# Patient Record
Sex: MALE | Race: WHITE | NOT HISPANIC OR LATINO | Employment: FULL TIME | URBAN - METROPOLITAN AREA
[De-identification: names, ages, dates, MRNs, and addresses within clinical notes are randomized per-mention and may not be internally consistent; named-entity substitution may affect disease eponyms.]

---

## 2019-11-29 ENCOUNTER — APPOINTMENT (OUTPATIENT)
Dept: PREADMISSION TESTING | Facility: HOSPITAL | Age: 54
End: 2019-11-29

## 2019-11-29 VITALS
DIASTOLIC BLOOD PRESSURE: 104 MMHG | HEART RATE: 71 BPM | SYSTOLIC BLOOD PRESSURE: 170 MMHG | WEIGHT: 315 LBS | BODY MASS INDEX: 47.74 KG/M2 | TEMPERATURE: 98.2 F | OXYGEN SATURATION: 99 % | RESPIRATION RATE: 20 BRPM | HEIGHT: 68 IN

## 2019-11-29 LAB
ANION GAP SERPL CALCULATED.3IONS-SCNC: 13.4 MMOL/L (ref 5–15)
BUN BLD-MCNC: 16 MG/DL (ref 6–20)
BUN/CREAT SERPL: 17.8 (ref 7–25)
CALCIUM SPEC-SCNC: 9.4 MG/DL (ref 8.6–10.5)
CHLORIDE SERPL-SCNC: 101 MMOL/L (ref 98–107)
CO2 SERPL-SCNC: 26.6 MMOL/L (ref 22–29)
CREAT BLD-MCNC: 0.9 MG/DL (ref 0.76–1.27)
DEPRECATED RDW RBC AUTO: 39.8 FL (ref 37–54)
ERYTHROCYTE [DISTWIDTH] IN BLOOD BY AUTOMATED COUNT: 13 % (ref 12.3–15.4)
GFR SERPL CREATININE-BSD FRML MDRD: 88 ML/MIN/1.73
GLUCOSE BLD-MCNC: 124 MG/DL (ref 65–99)
HCT VFR BLD AUTO: 41.3 % (ref 37.5–51)
HGB BLD-MCNC: 14.3 G/DL (ref 13–17.7)
MCH RBC QN AUTO: 29.7 PG (ref 26.6–33)
MCHC RBC AUTO-ENTMCNC: 34.6 G/DL (ref 31.5–35.7)
MCV RBC AUTO: 85.9 FL (ref 79–97)
PLATELET # BLD AUTO: 209 10*3/MM3 (ref 140–450)
PMV BLD AUTO: 9.9 FL (ref 6–12)
POTASSIUM BLD-SCNC: 4.7 MMOL/L (ref 3.5–5.2)
RBC # BLD AUTO: 4.81 10*6/MM3 (ref 4.14–5.8)
SODIUM BLD-SCNC: 141 MMOL/L (ref 136–145)
WBC NRBC COR # BLD: 6.41 10*3/MM3 (ref 3.4–10.8)

## 2019-11-29 PROCEDURE — 80048 BASIC METABOLIC PNL TOTAL CA: CPT | Performed by: OTOLARYNGOLOGY

## 2019-11-29 PROCEDURE — 36415 COLL VENOUS BLD VENIPUNCTURE: CPT

## 2019-11-29 PROCEDURE — 85027 COMPLETE CBC AUTOMATED: CPT | Performed by: OTOLARYNGOLOGY

## 2019-11-29 PROCEDURE — 93010 ELECTROCARDIOGRAM REPORT: CPT | Performed by: INTERNAL MEDICINE

## 2019-11-29 PROCEDURE — 93005 ELECTROCARDIOGRAM TRACING: CPT

## 2019-11-29 RX ORDER — CETIRIZINE HYDROCHLORIDE 10 MG/1
10 TABLET ORAL DAILY
COMMUNITY

## 2019-11-29 RX ORDER — MOMETASONE FUROATE 50 UG/1
2 SPRAY, METERED NASAL NIGHTLY
COMMUNITY

## 2019-11-29 RX ORDER — IBUPROFEN 200 MG
400 TABLET ORAL EVERY 6 HOURS PRN
COMMUNITY

## 2019-11-29 RX ORDER — ACETAMINOPHEN 500 MG
1000 TABLET ORAL EVERY 6 HOURS PRN
COMMUNITY

## 2019-11-29 RX ORDER — LISINOPRIL 40 MG/1
40 TABLET ORAL NIGHTLY
COMMUNITY

## 2019-12-03 ENCOUNTER — ANESTHESIA (OUTPATIENT)
Dept: PERIOP | Facility: HOSPITAL | Age: 54
End: 2019-12-03

## 2019-12-03 ENCOUNTER — HOSPITAL ENCOUNTER (OUTPATIENT)
Facility: HOSPITAL | Age: 54
Setting detail: HOSPITAL OUTPATIENT SURGERY
Discharge: HOME | End: 2019-12-03
Attending: OTOLARYNGOLOGY | Admitting: OTOLARYNGOLOGY

## 2019-12-03 ENCOUNTER — ANESTHESIA EVENT (OUTPATIENT)
Dept: PERIOP | Facility: HOSPITAL | Age: 54
End: 2019-12-03

## 2019-12-03 VITALS
HEIGHT: 68 IN | DIASTOLIC BLOOD PRESSURE: 95 MMHG | HEART RATE: 70 BPM | BODY MASS INDEX: 47.74 KG/M2 | RESPIRATION RATE: 16 BRPM | WEIGHT: 315 LBS | SYSTOLIC BLOOD PRESSURE: 147 MMHG | OXYGEN SATURATION: 94 % | TEMPERATURE: 98.3 F

## 2019-12-03 DIAGNOSIS — J35.8 TONSIL ASYMMETRY: ICD-10-CM

## 2019-12-03 LAB — GLUCOSE BLDC GLUCOMTR-MCNC: 160 MG/DL (ref 70–130)

## 2019-12-03 PROCEDURE — 25010000002 HYDROMORPHONE PER 4 MG: Performed by: NURSE ANESTHETIST, CERTIFIED REGISTERED

## 2019-12-03 PROCEDURE — 88341 IMHCHEM/IMCYTCHM EA ADD ANTB: CPT | Performed by: OTOLARYNGOLOGY

## 2019-12-03 PROCEDURE — 25010000002 PROPOFOL 10 MG/ML EMULSION: Performed by: NURSE ANESTHETIST, CERTIFIED REGISTERED

## 2019-12-03 PROCEDURE — 25010000002 MIDAZOLAM PER 1 MG: Performed by: ANESTHESIOLOGY

## 2019-12-03 PROCEDURE — 25010000002 FENTANYL CITRATE (PF) 100 MCG/2ML SOLUTION: Performed by: NURSE ANESTHETIST, CERTIFIED REGISTERED

## 2019-12-03 PROCEDURE — 88342 IMHCHEM/IMCYTCHM 1ST ANTB: CPT | Performed by: OTOLARYNGOLOGY

## 2019-12-03 PROCEDURE — 25010000002 SUCCINYLCHOLINE PER 20 MG: Performed by: NURSE ANESTHETIST, CERTIFIED REGISTERED

## 2019-12-03 PROCEDURE — 82962 GLUCOSE BLOOD TEST: CPT

## 2019-12-03 PROCEDURE — 25010000002 ONDANSETRON PER 1 MG: Performed by: NURSE ANESTHETIST, CERTIFIED REGISTERED

## 2019-12-03 PROCEDURE — 25010000002 MAGNESIUM SULFATE PER 500 MG OF MAGNESIUM: Performed by: NURSE ANESTHETIST, CERTIFIED REGISTERED

## 2019-12-03 PROCEDURE — 88304 TISSUE EXAM BY PATHOLOGIST: CPT | Performed by: OTOLARYNGOLOGY

## 2019-12-03 PROCEDURE — 25010000002 ROPIVACAINE PER 1 MG: Performed by: OTOLARYNGOLOGY

## 2019-12-03 PROCEDURE — 88331 PATH CONSLTJ SURG 1 BLK 1SPC: CPT | Performed by: OTOLARYNGOLOGY

## 2019-12-03 RX ORDER — ROPIVACAINE HYDROCHLORIDE 5 MG/ML
INJECTION, SOLUTION EPIDURAL; INFILTRATION; PERINEURAL AS NEEDED
Status: DISCONTINUED | OUTPATIENT
Start: 2019-12-03 | End: 2019-12-03 | Stop reason: HOSPADM

## 2019-12-03 RX ORDER — MAGNESIUM HYDROXIDE 1200 MG/15ML
LIQUID ORAL AS NEEDED
Status: DISCONTINUED | OUTPATIENT
Start: 2019-12-03 | End: 2019-12-03 | Stop reason: HOSPADM

## 2019-12-03 RX ORDER — LIDOCAINE HYDROCHLORIDE 10 MG/ML
0.5 INJECTION, SOLUTION EPIDURAL; INFILTRATION; INTRACAUDAL; PERINEURAL ONCE AS NEEDED
Status: DISCONTINUED | OUTPATIENT
Start: 2019-12-03 | End: 2019-12-03 | Stop reason: HOSPADM

## 2019-12-03 RX ORDER — MIDAZOLAM HYDROCHLORIDE 1 MG/ML
1 INJECTION INTRAMUSCULAR; INTRAVENOUS
Status: DISCONTINUED | OUTPATIENT
Start: 2019-12-03 | End: 2019-12-03 | Stop reason: HOSPADM

## 2019-12-03 RX ORDER — ROCURONIUM BROMIDE 10 MG/ML
INJECTION, SOLUTION INTRAVENOUS AS NEEDED
Status: DISCONTINUED | OUTPATIENT
Start: 2019-12-03 | End: 2019-12-03 | Stop reason: SURG

## 2019-12-03 RX ORDER — ONDANSETRON 2 MG/ML
4 INJECTION INTRAMUSCULAR; INTRAVENOUS ONCE AS NEEDED
Status: DISCONTINUED | OUTPATIENT
Start: 2019-12-03 | End: 2019-12-03 | Stop reason: HOSPADM

## 2019-12-03 RX ORDER — DIPHENHYDRAMINE HCL 25 MG
25 CAPSULE ORAL
Status: DISCONTINUED | OUTPATIENT
Start: 2019-12-03 | End: 2019-12-03 | Stop reason: HOSPADM

## 2019-12-03 RX ORDER — LIDOCAINE HYDROCHLORIDE 20 MG/ML
INJECTION, SOLUTION INFILTRATION; PERINEURAL AS NEEDED
Status: DISCONTINUED | OUTPATIENT
Start: 2019-12-03 | End: 2019-12-03 | Stop reason: SURG

## 2019-12-03 RX ORDER — FAMOTIDINE 10 MG/ML
20 INJECTION, SOLUTION INTRAVENOUS ONCE
Status: COMPLETED | OUTPATIENT
Start: 2019-12-03 | End: 2019-12-03

## 2019-12-03 RX ORDER — HYDROCODONE BITARTRATE AND ACETAMINOPHEN 7.5; 325 MG/1; MG/1
1 TABLET ORAL ONCE AS NEEDED
Status: DISCONTINUED | OUTPATIENT
Start: 2019-12-03 | End: 2019-12-03 | Stop reason: HOSPADM

## 2019-12-03 RX ORDER — DIPHENHYDRAMINE HYDROCHLORIDE 50 MG/ML
12.5 INJECTION INTRAMUSCULAR; INTRAVENOUS
Status: DISCONTINUED | OUTPATIENT
Start: 2019-12-03 | End: 2019-12-03 | Stop reason: HOSPADM

## 2019-12-03 RX ORDER — MEPERIDINE HYDROCHLORIDE 25 MG/ML
12.5 INJECTION INTRAMUSCULAR; INTRAVENOUS; SUBCUTANEOUS
Status: DISCONTINUED | OUTPATIENT
Start: 2019-12-03 | End: 2019-12-03 | Stop reason: HOSPADM

## 2019-12-03 RX ORDER — FENTANYL CITRATE 50 UG/ML
50 INJECTION, SOLUTION INTRAMUSCULAR; INTRAVENOUS
Status: DISCONTINUED | OUTPATIENT
Start: 2019-12-03 | End: 2019-12-03 | Stop reason: HOSPADM

## 2019-12-03 RX ORDER — EPHEDRINE SULFATE 50 MG/ML
5 INJECTION, SOLUTION INTRAVENOUS ONCE AS NEEDED
Status: DISCONTINUED | OUTPATIENT
Start: 2019-12-03 | End: 2019-12-03 | Stop reason: HOSPADM

## 2019-12-03 RX ORDER — SODIUM CHLORIDE 0.9 % (FLUSH) 0.9 %
3 SYRINGE (ML) INJECTION EVERY 12 HOURS SCHEDULED
Status: DISCONTINUED | OUTPATIENT
Start: 2019-12-03 | End: 2019-12-03 | Stop reason: HOSPADM

## 2019-12-03 RX ORDER — FLUMAZENIL 0.1 MG/ML
0.2 INJECTION INTRAVENOUS AS NEEDED
Status: DISCONTINUED | OUTPATIENT
Start: 2019-12-03 | End: 2019-12-03 | Stop reason: HOSPADM

## 2019-12-03 RX ORDER — HYDROCODONE BITARTRATE AND ACETAMINOPHEN 5; 325 MG/1; MG/1
1 TABLET ORAL EVERY 4 HOURS PRN
Qty: 15 TABLET | Refills: 0 | Status: SHIPPED | OUTPATIENT
Start: 2019-12-03

## 2019-12-03 RX ORDER — IPRATROPIUM BROMIDE AND ALBUTEROL SULFATE 2.5; .5 MG/3ML; MG/3ML
3 SOLUTION RESPIRATORY (INHALATION) ONCE AS NEEDED
Status: DISCONTINUED | OUTPATIENT
Start: 2019-12-03 | End: 2019-12-03 | Stop reason: HOSPADM

## 2019-12-03 RX ORDER — LABETALOL HYDROCHLORIDE 5 MG/ML
5 INJECTION, SOLUTION INTRAVENOUS
Status: DISCONTINUED | OUTPATIENT
Start: 2019-12-03 | End: 2019-12-03 | Stop reason: HOSPADM

## 2019-12-03 RX ORDER — SODIUM CHLORIDE, SODIUM LACTATE, POTASSIUM CHLORIDE, CALCIUM CHLORIDE 600; 310; 30; 20 MG/100ML; MG/100ML; MG/100ML; MG/100ML
100 INJECTION, SOLUTION INTRAVENOUS CONTINUOUS
Status: DISCONTINUED | OUTPATIENT
Start: 2019-12-03 | End: 2019-12-03 | Stop reason: HOSPADM

## 2019-12-03 RX ORDER — ERYTHROMYCIN 5 MG/G
OINTMENT OPHTHALMIC 2 TIMES DAILY
Qty: 3.5 G | Refills: 2 | Status: SHIPPED | OUTPATIENT
Start: 2019-12-03 | End: 2019-12-03 | Stop reason: HOSPADM

## 2019-12-03 RX ORDER — FENTANYL CITRATE 50 UG/ML
INJECTION, SOLUTION INTRAMUSCULAR; INTRAVENOUS AS NEEDED
Status: DISCONTINUED | OUTPATIENT
Start: 2019-12-03 | End: 2019-12-03 | Stop reason: SURG

## 2019-12-03 RX ORDER — MAGNESIUM SULFATE HEPTAHYDRATE 500 MG/ML
INJECTION, SOLUTION INTRAMUSCULAR; INTRAVENOUS AS NEEDED
Status: DISCONTINUED | OUTPATIENT
Start: 2019-12-03 | End: 2019-12-03 | Stop reason: SURG

## 2019-12-03 RX ORDER — NALOXONE HCL 0.4 MG/ML
0.2 VIAL (ML) INJECTION AS NEEDED
Status: DISCONTINUED | OUTPATIENT
Start: 2019-12-03 | End: 2019-12-03 | Stop reason: HOSPADM

## 2019-12-03 RX ORDER — OXYCODONE AND ACETAMINOPHEN 7.5; 325 MG/1; MG/1
1 TABLET ORAL ONCE AS NEEDED
Status: DISCONTINUED | OUTPATIENT
Start: 2019-12-03 | End: 2019-12-03 | Stop reason: HOSPADM

## 2019-12-03 RX ORDER — CEFAZOLIN SODIUM IN 0.9 % NACL 3 G/100 ML
3 INTRAVENOUS SOLUTION, PIGGYBACK (ML) INTRAVENOUS ONCE
Status: COMPLETED | OUTPATIENT
Start: 2019-12-03 | End: 2019-12-03

## 2019-12-03 RX ORDER — SCOLOPAMINE TRANSDERMAL SYSTEM 1 MG/1
1 PATCH, EXTENDED RELEASE TRANSDERMAL CONTINUOUS
Status: DISCONTINUED | OUTPATIENT
Start: 2019-12-03 | End: 2019-12-03 | Stop reason: HOSPADM

## 2019-12-03 RX ORDER — LIDOCAINE HYDROCHLORIDE 40 MG/ML
SOLUTION TOPICAL AS NEEDED
Status: DISCONTINUED | OUTPATIENT
Start: 2019-12-03 | End: 2019-12-03 | Stop reason: SURG

## 2019-12-03 RX ORDER — SUCCINYLCHOLINE CHLORIDE 20 MG/ML
INJECTION INTRAMUSCULAR; INTRAVENOUS AS NEEDED
Status: DISCONTINUED | OUTPATIENT
Start: 2019-12-03 | End: 2019-12-03 | Stop reason: SURG

## 2019-12-03 RX ORDER — HYDRALAZINE HYDROCHLORIDE 20 MG/ML
5 INJECTION INTRAMUSCULAR; INTRAVENOUS
Status: DISCONTINUED | OUTPATIENT
Start: 2019-12-03 | End: 2019-12-03 | Stop reason: HOSPADM

## 2019-12-03 RX ORDER — PROMETHAZINE HYDROCHLORIDE 25 MG/ML
12.5 INJECTION, SOLUTION INTRAMUSCULAR; INTRAVENOUS ONCE AS NEEDED
Status: DISCONTINUED | OUTPATIENT
Start: 2019-12-03 | End: 2019-12-03 | Stop reason: HOSPADM

## 2019-12-03 RX ORDER — ACETAMINOPHEN 325 MG/1
650 TABLET ORAL ONCE AS NEEDED
Status: COMPLETED | OUTPATIENT
Start: 2019-12-03 | End: 2019-12-03

## 2019-12-03 RX ORDER — MIDAZOLAM HYDROCHLORIDE 1 MG/ML
2 INJECTION INTRAMUSCULAR; INTRAVENOUS
Status: DISCONTINUED | OUTPATIENT
Start: 2019-12-03 | End: 2019-12-03 | Stop reason: HOSPADM

## 2019-12-03 RX ORDER — SODIUM CHLORIDE 0.9 % (FLUSH) 0.9 %
3-10 SYRINGE (ML) INJECTION AS NEEDED
Status: DISCONTINUED | OUTPATIENT
Start: 2019-12-03 | End: 2019-12-03 | Stop reason: HOSPADM

## 2019-12-03 RX ORDER — PROPOFOL 10 MG/ML
VIAL (ML) INTRAVENOUS AS NEEDED
Status: DISCONTINUED | OUTPATIENT
Start: 2019-12-03 | End: 2019-12-03 | Stop reason: SURG

## 2019-12-03 RX ORDER — HYDROMORPHONE HYDROCHLORIDE 1 MG/ML
0.5 INJECTION, SOLUTION INTRAMUSCULAR; INTRAVENOUS; SUBCUTANEOUS
Status: DISCONTINUED | OUTPATIENT
Start: 2019-12-03 | End: 2019-12-03 | Stop reason: HOSPADM

## 2019-12-03 RX ORDER — HYDROCODONE BITARTRATE AND ACETAMINOPHEN 5; 325 MG/1; MG/1
1-2 TABLET ORAL EVERY 4 HOURS PRN
Qty: 15 TABLET | Refills: 0 | Status: SHIPPED | OUTPATIENT
Start: 2019-12-03

## 2019-12-03 RX ORDER — ONDANSETRON 2 MG/ML
INJECTION INTRAMUSCULAR; INTRAVENOUS AS NEEDED
Status: DISCONTINUED | OUTPATIENT
Start: 2019-12-03 | End: 2019-12-03 | Stop reason: SURG

## 2019-12-03 RX ORDER — SODIUM CHLORIDE, SODIUM LACTATE, POTASSIUM CHLORIDE, CALCIUM CHLORIDE 600; 310; 30; 20 MG/100ML; MG/100ML; MG/100ML; MG/100ML
9 INJECTION, SOLUTION INTRAVENOUS CONTINUOUS
Status: DISCONTINUED | OUTPATIENT
Start: 2019-12-03 | End: 2019-12-03 | Stop reason: HOSPADM

## 2019-12-03 RX ADMIN — CEFAZOLIN 3 G: 1 INJECTION, POWDER, FOR SOLUTION INTRAMUSCULAR; INTRAVENOUS; PARENTERAL at 07:00

## 2019-12-03 RX ADMIN — ROCURONIUM BROMIDE 40 MG: 10 INJECTION INTRAVENOUS at 07:20

## 2019-12-03 RX ADMIN — HYDROMORPHONE HYDROCHLORIDE 0.5 MG: 1 INJECTION, SOLUTION INTRAMUSCULAR; INTRAVENOUS; SUBCUTANEOUS at 08:45

## 2019-12-03 RX ADMIN — MIDAZOLAM 1 MG: 1 INJECTION INTRAMUSCULAR; INTRAVENOUS at 06:37

## 2019-12-03 RX ADMIN — LIDOCAINE HYDROCHLORIDE 60 MG: 20 INJECTION, SOLUTION INFILTRATION; PERINEURAL at 07:09

## 2019-12-03 RX ADMIN — FENTANYL CITRATE 50 MCG: 50 INJECTION INTRAMUSCULAR; INTRAVENOUS at 07:01

## 2019-12-03 RX ADMIN — MAGNESIUM SULFATE HEPTAHYDRATE 2 G: 500 INJECTION, SOLUTION INTRAMUSCULAR; INTRAVENOUS at 07:13

## 2019-12-03 RX ADMIN — LIDOCAINE HYDROCHLORIDE 1 EACH: 40 SOLUTION TOPICAL at 07:11

## 2019-12-03 RX ADMIN — SODIUM CHLORIDE, POTASSIUM CHLORIDE, SODIUM LACTATE AND CALCIUM CHLORIDE: 600; 310; 30; 20 INJECTION, SOLUTION INTRAVENOUS at 07:35

## 2019-12-03 RX ADMIN — PROPOFOL 200 MG: 10 INJECTION, EMULSION INTRAVENOUS at 07:09

## 2019-12-03 RX ADMIN — FENTANYL CITRATE 50 MCG: 50 INJECTION INTRAMUSCULAR; INTRAVENOUS at 07:06

## 2019-12-03 RX ADMIN — ACETAMINOPHEN 650 MG: 325 TABLET, FILM COATED ORAL at 09:50

## 2019-12-03 RX ADMIN — SODIUM CHLORIDE, POTASSIUM CHLORIDE, SODIUM LACTATE AND CALCIUM CHLORIDE 9 ML/HR: 600; 310; 30; 20 INJECTION, SOLUTION INTRAVENOUS at 06:37

## 2019-12-03 RX ADMIN — SUGAMMADEX 400 MG: 100 INJECTION, SOLUTION INTRAVENOUS at 07:54

## 2019-12-03 RX ADMIN — SUCCINYLCHOLINE CHLORIDE 200 MG: 20 INJECTION, SOLUTION INTRAMUSCULAR; INTRAVENOUS; PARENTERAL at 07:10

## 2019-12-03 RX ADMIN — ROCURONIUM BROMIDE 10 MG: 10 INJECTION INTRAVENOUS at 07:09

## 2019-12-03 RX ADMIN — FAMOTIDINE 20 MG: 10 INJECTION INTRAVENOUS at 06:37

## 2019-12-03 RX ADMIN — ONDANSETRON HYDROCHLORIDE 4 MG: 2 SOLUTION INTRAMUSCULAR; INTRAVENOUS at 07:55

## 2019-12-03 NOTE — ANESTHESIA POSTPROCEDURE EVALUATION
Patient: Hayden Michaud    Procedure Summary     Date:  12/03/19 Room / Location:  [address] MAIN OR    Anesthesia Start:  0700 Anesthesia Stop:  0816    Procedure:  LEFT TONSIL BIOPSY (Left Throat) Diagnosis:      Surgeon:  Lebron Truong MD Provider:  Ian Coates MD    Anesthesia Type:  general ASA Status:  3          Anesthesia Type: general  Last vitals  BP   147/95 (12/03/19 0915)   Temp   36.8 °C (98.3 °F) (12/03/19 0817)   Pulse   70 (12/03/19 0915)   Resp   16 (12/03/19 0915)     SpO2   94 % (12/03/19 0915)     Post Anesthesia Care and Evaluation    Patient location during evaluation: PACU  Patient participation: complete - patient participated  Level of consciousness: awake and alert  Pain management: adequate  Airway patency: patent  Anesthetic complications: No anesthetic complications    Cardiovascular status: acceptable  Respiratory status: acceptable  Hydration status: acceptable    Comments: --------------------            12/03/19 0915     --------------------   BP:       147/95     Pulse:      70       Resp:       16       Temp:                SpO2:      94%      --------------------

## 2019-12-03 NOTE — OP NOTE
OPERATIVE NOTE    Patient Identification:  Name: Hayden Michaud  Age: 54 y.o.  Sex: male  :  1965  MRN: [id number]                                               Preoperative diagnosis: Bilateral upper eyelid ptosis  Postoperative diagnosis: same  Procedure: bilateral upper eyelid ptosis repair  Surgeon: Adiel Shaikh MD who was present and scrubbed throughout all critical portions of the operation  Assistants: Harvinder Perez Jr, MD, Ru Bradley MD, Crow De La Cruz MD  Anesthesia: MAC  EBL: less than 50cc  Specimens:    Order Name Source Comment Collection Info Order Time   TISSUE PATHOLOGY EXAM Tonsil, Left    Collected By: Lebron Truong MD 12/3/2019  7:37 AM       Description of the procedure: The patient was taken to the operating room and placed on the table in the supine position, where anesthesia was induced. 2% lidocaine with epinephrine and 0.5% marcaine in a 1:1 fashion was injected over the surgical site, and the patient was prepped and draped in the usual manner for orbitofacial surgery.     Corneal protectors were placed in both eyes.     A 15 Bard-Sander blade incision was made through the eyelid crease 8 mm from the eyelash margin, across the entire horizontal extent of the left upper eyelid. A second incision was made 9 mm superior to the previous incision, and a pinch test was used to ensure the amount of skin excision was appropriate. The intervening tissue was excised with a 15 Bard-Sander blade and Wilton scissors. The orbital septum was opened horizontally, and the levator aponeurosis was identified. A 4 mm full-thickness en bloc excision of the levator aponeurosis was carried out at the superior border of the tarsal plate. The cut edge of the levator aponeurosis was advanced to the superior border of the tarsal plate and was held in position with multiple interrupted 7-0 silk sutures, partially penetrating the tarsus to avoid corneal irritation. The sutures were adjusted  until the eyelid height and contour were judged to be satisfactory. The skin was then closed with 5-0 fast absorbing suture in an interrupted and running fashion, with care to incorporate the prestarsal orbicularis over the pupil, nasal and temporal limbi respectively.     The exact same procedure was performed on the contralateral lid.     The corneal protectors were removed and antibiotic ophthalmic ointment was placed over the surgical site.     The patient was then awakened and taken from the operating room in good condition, having tolerated the procedure well. There were no complications, and the estimated blood loss was less than 50 cc.

## 2019-12-03 NOTE — ANESTHESIA PROCEDURE NOTES
Airway  Urgency: elective    Date/Time: 12/3/2019 7:11 AM  Airway not difficult    General Information and Staff    Patient location during procedure: OR  Anesthesiologist: Ian Coates MD  CRNA: Rocio Patton CRNA    Indications and Patient Condition  Indications for airway management: airway protection    Preoxygenated: yes  MILS maintained throughout  Mask difficulty assessment: 1 - vent by mask    Final Airway Details  Final airway type: endotracheal airway      Successful airway: ETT  Cuffed: yes   Successful intubation technique: direct laryngoscopy  Facilitating devices/methods: Bougie  Endotracheal tube insertion site: oral  Blade: [place]  Blade size: 4  ETT size (mm): 7.5  Cormack-Lehane Classification: grade IIb - view of arytenoids or posterior of glottis only  Placement verified by: chest auscultation and capnometry   Cuff volume (mL): 7  Measured from: lips  ETT/EBT  to lips (cm): 22  Number of attempts at approach: 2  Assessment: lips, teeth, and gum same as pre-op and atraumatic intubation    Additional Comments  Anterior, small mouth opening, easy mask. Wedge pillow used.  Atraumatic ET Tube placement.  Teeth as pre-op. BLEBS.  -ABD sounds.  +ET CO2.  Secured to face

## 2019-12-03 NOTE — DISCHARGE INSTRUCTIONS
Scopolamine Patch  This patch has been applied to the skin behind one of your ears.  It may stay in place up to 24 hours. You may remove it at any time after your surgery; however, it should be removed after you are up and walking around the next day.  This medicine reduces stomach upset. Side effects may include: dry mouth, dizziness, sleepiness, constipation, or upset stomach.  An allergy would show up as: a rash, itching, wheezing or shortness of breath.  Follow these instructions:  1. Do not drink alcohol, drive or operate machinery while taking this medicine.  2. Wear only 1 patch at a time. You can leave the patch on for up to 24 hours.  3. When you remove the patch, fold it in half with the sticky sides together and throw it away. Wash your hands and the area under the patch.  4. Do not touch your eye with your hand if it has touched the patch.  5. Wash your hands well before and after touching the patch.  6. Sit or stand slowly to avoid dizziness.  Call your doctor if you have:  1. Any sign of allergy  2. No relief  3. Trouble passing urine  4. Any new or severe symptoms        YOU HAD ACETAMINOPHEN ABOUT 10 AM

## 2019-12-03 NOTE — OP NOTE
[facility] OPERATIVE NOTE  12/3/2019    NAME: Hayden Michaud    YOB: 1965  MRN: [id number]    PRE-OPERATIVE DIAGNOSIS: Left tonsil hypertrophy rule out malignancy      POST-OPERATIVE DIAGNOSIS: same    PROCEDURE PERFORMED: Biopsy of left tonsil    SURGEON: Lebron Truong MD    ASSISTANT(S): None    ANESTHESIA: General Anesthesia via Endotracheal Tube    INDICATIONS: The patient is a 54 y.o. male with significant enlargement of the left tonsil.  This was noted on initial examination.  Neck CT showed diffuse enlargement of the left tonsil.  A right paratracheal lymph node was identified and confirmed on chest CT.    FINDINGS: The left tonsil was very firm and diffusely enlarged infiltrating into the soft tissues of the soft palate and valery-tonsil area.  After examining and palpating this under general anesthesia the decision was made to perform tonsil biopsy to get tissue diagnosis.  Frozen section was positive for malignancy and likely squamous cell carcinoma.    PROCEDURE: Patient was brought to the operating room.  He was given general anesthesia and orally intubated.  Patient was already placed on a wedge to elevate the head and neck.  Towels were placed over the face.  A McIvor mouthgag was introduced and secured.  The oropharynx and oral cavity were examined.  Both tonsils are noted to be enlarged with the left tonsil significantly.  On palpation of the left tonsil was diffusely enlarged and firm and appeared to be somewhat mobile.  Due to the size and extensive nature on palpation I chose to do a tonsil biopsy.    A curvilinear mucosal incision was made above the left tonsil through the soft palate.  This was dissected down to tonsil tissue which on visual and digital examination showed this to be rather firm throughout.  Using sharp and blunt dissection tonsil tissue was removed and sent for frozen section.  Additional piece was taken and sent separately.  Hemostasis was  controlled with cautery.    Patient was extubated in the operating room after removing all intraoral instrumentation.  He was transferred to recovery in stable condition.  All sponge and instrument counts were correct.      SPECIMENS: left tonsil tissue    COMPLICATIONS: NONE    ESTIMATED BLOOD LOSS: 10 cc    Lebron Truong MD  12/3/2019

## 2019-12-03 NOTE — ANESTHESIA PREPROCEDURE EVALUATION
Anesthesia Evaluation     Patient summary reviewed and Nursing notes reviewed   NPO Solid Status: > 8 hours  NPO Liquid Status: > 2 hours           Airway   Mallampati: III  TM distance: >3 FB  Neck ROM: full  Possible difficult intubation and Large neck circumference  Dental - normal exam     Pulmonary - negative pulmonary ROS and normal exam    breath sounds clear to auscultation  Cardiovascular     Rhythm: regular  Rate: normal    (+) hypertension less than 2 medications, valvular problems/murmurs murmur, murmur, hyperlipidemia,   (-) angina, orthopnea, PND, BARON      Neuro/Psych  (+) psychiatric history Depression,     GI/Hepatic/Renal/Endo    (+) morbid obesity,  diabetes mellitus type 2,     Musculoskeletal (-) negative ROS    Abdominal    Substance History - negative use     OB/GYN negative ob/gyn ROS         Other - negative ROS                     Anesthesia Plan    ASA 3     general     intravenous induction     Anesthetic plan, all risks, benefits, and alternatives have been provided, discussed and informed consent has been obtained with: patient.

## 2019-12-06 ENCOUNTER — TRANSCRIBE ORDERS (OUTPATIENT)
Dept: ADMINISTRATIVE | Facility: HOSPITAL | Age: 54
End: 2019-12-06

## 2019-12-06 DIAGNOSIS — C44.92 SCC (SQUAMOUS CELL CARCINOMA): ICD-10-CM

## 2019-12-06 DIAGNOSIS — C44.92 SQUAMOUS CELL CARCINOMA OF SKIN: Primary | ICD-10-CM

## 2019-12-11 ENCOUNTER — APPOINTMENT (OUTPATIENT)
Dept: PET IMAGING | Facility: HOSPITAL | Age: 54
End: 2019-12-11

## 2019-12-11 ENCOUNTER — HOSPITAL ENCOUNTER (OUTPATIENT)
Dept: PET IMAGING | Facility: HOSPITAL | Age: 54
End: 2019-12-11

## 2019-12-12 ENCOUNTER — HOSPITAL ENCOUNTER (OUTPATIENT)
Dept: PET IMAGING | Facility: HOSPITAL | Age: 54
Discharge: HOME | End: 2019-12-12

## 2019-12-12 ENCOUNTER — HOSPITAL ENCOUNTER (OUTPATIENT)
Dept: PET IMAGING | Facility: HOSPITAL | Age: 54
Discharge: HOME | End: 2019-12-12
Admitting: OTOLARYNGOLOGY

## 2019-12-12 DIAGNOSIS — C44.92 SCC (SQUAMOUS CELL CARCINOMA): ICD-10-CM

## 2019-12-12 LAB — GLUCOSE BLDC GLUCOMTR-MCNC: 123 MG/DL (ref 70–130)

## 2019-12-12 PROCEDURE — 0 FLUDEOXYGLUCOSE F18 SOLUTION: Performed by: OTOLARYNGOLOGY

## 2019-12-12 PROCEDURE — 78815 PET IMAGE W/CT SKULL-THIGH: CPT

## 2019-12-12 PROCEDURE — A9552 F18 FDG: HCPCS | Performed by: OTOLARYNGOLOGY

## 2019-12-12 PROCEDURE — 82962 GLUCOSE BLOOD TEST: CPT

## 2019-12-12 RX ADMIN — FLUDEOXYGLUCOSE F18 1 DOSE: 300 INJECTION INTRAVENOUS at 09:21

## 2019-12-13 LAB
CYTO UR: NORMAL
LAB AP CASE REPORT: NORMAL
LAB AP CLINICAL INFORMATION: NORMAL
LAB AP DIAGNOSIS COMMENT: NORMAL
LAB AP SPECIAL STAINS: NORMAL
LAB AP SYNOPTIC CHECKLIST: NORMAL
Lab: NORMAL
PATH REPORT.ADDENDUM SPEC: NORMAL
PATH REPORT.FINAL DX SPEC: NORMAL
PATH REPORT.GROSS SPEC: NORMAL

## 2021-03-26 ENCOUNTER — BULK ORDERING (OUTPATIENT)
Dept: CASE MANAGEMENT | Facility: OTHER | Age: 56
End: 2021-03-26

## 2021-03-26 DIAGNOSIS — Z23 IMMUNIZATION DUE: ICD-10-CM

## 2021-08-02 ENCOUNTER — TRANSCRIBE ORDERS (OUTPATIENT)
Dept: CARDIOLOGY | Facility: CLINIC | Age: 56
End: 2021-08-02

## 2021-08-02 DIAGNOSIS — I35.0 NONRHEUMATIC AORTIC VALVE STENOSIS: Primary | ICD-10-CM

## 2021-08-04 ENCOUNTER — TRANSCRIBE ORDERS (OUTPATIENT)
Dept: CARDIOLOGY | Facility: CLINIC | Age: 56
End: 2021-08-04

## 2021-08-04 DIAGNOSIS — Z01.810 PRE-OPERATIVE CARDIOVASCULAR EXAMINATION: Primary | ICD-10-CM

## 2021-08-04 DIAGNOSIS — Z13.6 SCREENING FOR ISCHEMIC HEART DISEASE: ICD-10-CM

## 2021-08-04 DIAGNOSIS — Z01.818 OTHER SPECIFIED PRE-OPERATIVE EXAMINATION: Primary | ICD-10-CM

## 2021-08-06 ENCOUNTER — LAB (OUTPATIENT)
Dept: LAB | Facility: HOSPITAL | Age: 56
End: 2021-08-06

## 2021-08-06 DIAGNOSIS — Z01.810 PRE-OPERATIVE CARDIOVASCULAR EXAMINATION: ICD-10-CM

## 2021-08-06 DIAGNOSIS — Z13.6 SCREENING FOR ISCHEMIC HEART DISEASE: ICD-10-CM

## 2021-08-06 DIAGNOSIS — Z01.818 OTHER SPECIFIED PRE-OPERATIVE EXAMINATION: ICD-10-CM

## 2021-08-06 LAB
ANION GAP SERPL CALCULATED.3IONS-SCNC: 10.5 MMOL/L (ref 5–15)
BASOPHILS # BLD AUTO: 0.03 10*3/MM3 (ref 0–0.2)
BASOPHILS NFR BLD AUTO: 0.6 % (ref 0–1.5)
BUN SERPL-MCNC: 16 MG/DL (ref 6–20)
BUN/CREAT SERPL: 14.3 (ref 7–25)
CALCIUM SPEC-SCNC: 10 MG/DL (ref 8.6–10.5)
CHLORIDE SERPL-SCNC: 100 MMOL/L (ref 98–107)
CO2 SERPL-SCNC: 24.5 MMOL/L (ref 22–29)
CREAT SERPL-MCNC: 1.12 MG/DL (ref 0.76–1.27)
DEPRECATED RDW RBC AUTO: 43.2 FL (ref 37–54)
EOSINOPHIL # BLD AUTO: 0.29 10*3/MM3 (ref 0–0.4)
EOSINOPHIL NFR BLD AUTO: 5.6 % (ref 0.3–6.2)
ERYTHROCYTE [DISTWIDTH] IN BLOOD BY AUTOMATED COUNT: 13.3 % (ref 12.3–15.4)
GFR SERPL CREATININE-BSD FRML MDRD: 68 ML/MIN/1.73
GLUCOSE SERPL-MCNC: 202 MG/DL (ref 65–99)
HCT VFR BLD AUTO: 41.7 % (ref 37.5–51)
HGB BLD-MCNC: 14.5 G/DL (ref 13–17.7)
IMM GRANULOCYTES # BLD AUTO: 0.02 10*3/MM3 (ref 0–0.05)
IMM GRANULOCYTES NFR BLD AUTO: 0.4 % (ref 0–0.5)
LYMPHOCYTES # BLD AUTO: 1.64 10*3/MM3 (ref 0.7–3.1)
LYMPHOCYTES NFR BLD AUTO: 31.9 % (ref 19.6–45.3)
MCH RBC QN AUTO: 30.5 PG (ref 26.6–33)
MCHC RBC AUTO-ENTMCNC: 34.8 G/DL (ref 31.5–35.7)
MCV RBC AUTO: 87.6 FL (ref 79–97)
MONOCYTES # BLD AUTO: 0.55 10*3/MM3 (ref 0.1–0.9)
MONOCYTES NFR BLD AUTO: 10.7 % (ref 5–12)
NEUTROPHILS NFR BLD AUTO: 2.61 10*3/MM3 (ref 1.7–7)
NEUTROPHILS NFR BLD AUTO: 50.8 % (ref 42.7–76)
NRBC BLD AUTO-RTO: 0 /100 WBC (ref 0–0.2)
PLATELET # BLD AUTO: 219 10*3/MM3 (ref 140–450)
PMV BLD AUTO: 9.9 FL (ref 6–12)
POTASSIUM SERPL-SCNC: 5.2 MMOL/L (ref 3.5–5.2)
RBC # BLD AUTO: 4.76 10*6/MM3 (ref 4.14–5.8)
SARS-COV-2 ORF1AB RESP QL NAA+PROBE: NOT DETECTED
SODIUM SERPL-SCNC: 135 MMOL/L (ref 136–145)
WBC # BLD AUTO: 5.14 10*3/MM3 (ref 3.4–10.8)

## 2021-08-06 PROCEDURE — U0004 COV-19 TEST NON-CDC HGH THRU: HCPCS

## 2021-08-06 PROCEDURE — 36415 COLL VENOUS BLD VENIPUNCTURE: CPT

## 2021-08-06 PROCEDURE — C9803 HOPD COVID-19 SPEC COLLECT: HCPCS

## 2021-08-06 PROCEDURE — 80048 BASIC METABOLIC PNL TOTAL CA: CPT

## 2021-08-06 PROCEDURE — 85025 COMPLETE CBC W/AUTO DIFF WBC: CPT

## 2021-08-09 ENCOUNTER — HOSPITAL ENCOUNTER (OUTPATIENT)
Dept: CARDIOLOGY | Facility: HOSPITAL | Age: 56
Discharge: HOME | End: 2021-08-09
Admitting: INTERNAL MEDICINE

## 2021-08-09 VITALS
HEART RATE: 72 BPM | RESPIRATION RATE: 18 BRPM | SYSTOLIC BLOOD PRESSURE: 103 MMHG | OXYGEN SATURATION: 92 % | DIASTOLIC BLOOD PRESSURE: 61 MMHG

## 2021-08-09 DIAGNOSIS — I35.0 NONRHEUMATIC AORTIC VALVE STENOSIS: ICD-10-CM

## 2021-08-09 LAB
BH CV ECHO MEAS - BSA(HAYCOCK): 2.7 M^2
BH CV ECHO MEAS - BSA: 2.5 M^2
BH CV ECHO MEAS - BZI_BMI: 46.7 KILOGRAMS/M^2
BH CV ECHO MEAS - BZI_METRIC_HEIGHT: 172.7 CM
BH CV ECHO MEAS - BZI_METRIC_WEIGHT: 139.3 KG
BH CV ECHO MEAS - MV MAX PG: 7 MMHG
BH CV ECHO MEAS - MV MEAN PG: 3 MMHG
BH CV ECHO MEAS - MV V2 MAX: 132 CM/SEC
BH CV ECHO MEAS - MV V2 MEAN: 77.9 CM/SEC
BH CV ECHO MEAS - MV V2 VTI: 47 CM
LV EF 2D ECHO EST: 60 %
MAXIMAL PREDICTED HEART RATE: 165 BPM
STRESS TARGET HR: 140 BPM

## 2021-08-09 PROCEDURE — 93325 DOPPLER ECHO COLOR FLOW MAPG: CPT

## 2021-08-09 PROCEDURE — 25010000002 FENTANYL CITRATE (PF) 50 MCG/ML SOLUTION: Performed by: INTERNAL MEDICINE

## 2021-08-09 PROCEDURE — 93312 ECHO TRANSESOPHAGEAL: CPT

## 2021-08-09 PROCEDURE — 93320 DOPPLER ECHO COMPLETE: CPT | Performed by: INTERNAL MEDICINE

## 2021-08-09 PROCEDURE — 93320 DOPPLER ECHO COMPLETE: CPT

## 2021-08-09 PROCEDURE — 25010000002 MIDAZOLAM PER 1 MG: Performed by: INTERNAL MEDICINE

## 2021-08-09 PROCEDURE — 93312 ECHO TRANSESOPHAGEAL: CPT | Performed by: INTERNAL MEDICINE

## 2021-08-09 PROCEDURE — 76376 3D RENDER W/INTRP POSTPROCES: CPT

## 2021-08-09 PROCEDURE — 76376 3D RENDER W/INTRP POSTPROCES: CPT | Performed by: INTERNAL MEDICINE

## 2021-08-09 PROCEDURE — 93325 DOPPLER ECHO COLOR FLOW MAPG: CPT | Performed by: INTERNAL MEDICINE

## 2021-08-09 PROCEDURE — 99152 MOD SED SAME PHYS/QHP 5/>YRS: CPT

## 2021-08-09 RX ORDER — SODIUM CHLORIDE 9 MG/ML
INJECTION, SOLUTION INTRAVENOUS
Status: COMPLETED | OUTPATIENT
Start: 2021-08-09 | End: 2021-08-09

## 2021-08-09 RX ORDER — MIDAZOLAM HYDROCHLORIDE 1 MG/ML
INJECTION INTRAMUSCULAR; INTRAVENOUS
Status: COMPLETED | OUTPATIENT
Start: 2021-08-09 | End: 2021-08-09

## 2021-08-09 RX ORDER — LIDOCAINE HYDROCHLORIDE 20 MG/ML
SOLUTION OROPHARYNGEAL
Status: COMPLETED | OUTPATIENT
Start: 2021-08-09 | End: 2021-08-09

## 2021-08-09 RX ORDER — FENTANYL CITRATE 50 UG/ML
INJECTION, SOLUTION INTRAMUSCULAR; INTRAVENOUS
Status: COMPLETED | OUTPATIENT
Start: 2021-08-09 | End: 2021-08-09

## 2021-08-09 RX ADMIN — SODIUM CHLORIDE 50 ML/HR: 9 INJECTION, SOLUTION INTRAVENOUS at 07:47

## 2021-08-09 RX ADMIN — MIDAZOLAM HYDROCHLORIDE 2 MG: 1 INJECTION, SOLUTION INTRAMUSCULAR; INTRAVENOUS at 08:02

## 2021-08-09 RX ADMIN — MIDAZOLAM HYDROCHLORIDE 2 MG: 1 INJECTION, SOLUTION INTRAMUSCULAR; INTRAVENOUS at 07:59

## 2021-08-09 RX ADMIN — FENTANYL CITRATE 25 MCG: 50 INJECTION, SOLUTION INTRAMUSCULAR; INTRAVENOUS at 07:57

## 2021-08-09 RX ADMIN — FENTANYL CITRATE 25 MCG: 50 INJECTION, SOLUTION INTRAMUSCULAR; INTRAVENOUS at 08:02

## 2021-08-09 RX ADMIN — MIDAZOLAM HYDROCHLORIDE 2 MG: 1 INJECTION, SOLUTION INTRAMUSCULAR; INTRAVENOUS at 07:57

## 2021-08-09 RX ADMIN — LIDOCAINE HYDROCHLORIDE 10 ML: 20 SOLUTION ORAL; TOPICAL at 07:48

## 2021-08-09 RX ADMIN — FENTANYL CITRATE 25 MCG: 50 INJECTION, SOLUTION INTRAMUSCULAR; INTRAVENOUS at 07:59

## 2021-08-09 RX ADMIN — MIDAZOLAM HYDROCHLORIDE 2 MG: 1 INJECTION, SOLUTION INTRAMUSCULAR; INTRAVENOUS at 07:56

## 2021-08-09 RX ADMIN — FENTANYL CITRATE 25 MCG: 50 INJECTION, SOLUTION INTRAMUSCULAR; INTRAVENOUS at 07:56

## 2024-03-11 ENCOUNTER — HOSPITAL ENCOUNTER (INPATIENT)
Facility: HOSPITAL | Age: 59
LOS: 8 days | Discharge: HOME HEALTH | DRG: 217 | End: 2024-03-19
Attending: INTERNAL MEDICINE | Admitting: INTERNAL MEDICINE
Payer: COMMERCIAL

## 2024-03-11 ENCOUNTER — APPOINTMENT (OUTPATIENT)
Dept: GENERAL RADIOLOGY | Facility: HOSPITAL | Age: 59
DRG: 217 | End: 2024-03-11
Payer: COMMERCIAL

## 2024-03-11 DIAGNOSIS — Z95.2 S/P AVR (AORTIC VALVE REPLACEMENT): ICD-10-CM

## 2024-03-11 DIAGNOSIS — I44.1 HEART BLOCK AV SECOND DEGREE: ICD-10-CM

## 2024-03-11 DIAGNOSIS — I35.0 AORTIC VALVE STENOSIS, ETIOLOGY OF CARDIAC VALVE DISEASE UNSPECIFIED: Primary | ICD-10-CM

## 2024-03-11 PROBLEM — I20.0 UNSTABLE ANGINA: Status: ACTIVE | Noted: 2024-03-11

## 2024-03-11 LAB
ALBUMIN SERPL-MCNC: 4 G/DL (ref 3.5–5.2)
ALBUMIN/GLOB SERPL: 1.6 G/DL
ALP SERPL-CCNC: 95 U/L (ref 39–117)
ALT SERPL W P-5'-P-CCNC: 25 U/L (ref 1–41)
ANION GAP SERPL CALCULATED.3IONS-SCNC: 12.8 MMOL/L (ref 5–15)
AST SERPL-CCNC: <5 U/L (ref 1–40)
BASOPHILS # BLD AUTO: 0.03 10*3/MM3 (ref 0–0.2)
BASOPHILS NFR BLD AUTO: 0.4 % (ref 0–1.5)
BILIRUB SERPL-MCNC: 1.1 MG/DL (ref 0–1.2)
BUN SERPL-MCNC: 20 MG/DL (ref 6–20)
BUN/CREAT SERPL: 15.3 (ref 7–25)
CALCIUM SPEC-SCNC: 9.6 MG/DL (ref 8.6–10.5)
CHLORIDE SERPL-SCNC: 103 MMOL/L (ref 98–107)
CHOLEST SERPL-MCNC: 120 MG/DL (ref 0–200)
CO2 SERPL-SCNC: 21.2 MMOL/L (ref 22–29)
CREAT SERPL-MCNC: 1.31 MG/DL (ref 0.76–1.27)
DEPRECATED RDW RBC AUTO: 40.9 FL (ref 37–54)
EGFRCR SERPLBLD CKD-EPI 2021: 63.5 ML/MIN/1.73
EOSINOPHIL # BLD AUTO: 0.24 10*3/MM3 (ref 0–0.4)
EOSINOPHIL NFR BLD AUTO: 3.2 % (ref 0.3–6.2)
ERYTHROCYTE [DISTWIDTH] IN BLOOD BY AUTOMATED COUNT: 13 % (ref 12.3–15.4)
GEN 5 2HR TROPONIN T REFLEX: 19 NG/L
GLOBULIN UR ELPH-MCNC: 2.5 GM/DL
GLUCOSE BLDC GLUCOMTR-MCNC: 128 MG/DL (ref 70–130)
GLUCOSE SERPL-MCNC: 137 MG/DL (ref 65–99)
HBA1C MFR BLD: 7.3 % (ref 4.8–5.6)
HCT VFR BLD AUTO: 41.5 % (ref 37.5–51)
HDLC SERPL-MCNC: 27 MG/DL (ref 40–60)
HGB BLD-MCNC: 14 G/DL (ref 13–17.7)
IMM GRANULOCYTES # BLD AUTO: 0.02 10*3/MM3 (ref 0–0.05)
IMM GRANULOCYTES NFR BLD AUTO: 0.3 % (ref 0–0.5)
LDLC SERPL CALC-MCNC: 67 MG/DL (ref 0–100)
LDLC/HDLC SERPL: 2.33 {RATIO}
LYMPHOCYTES # BLD AUTO: 1.27 10*3/MM3 (ref 0.7–3.1)
LYMPHOCYTES NFR BLD AUTO: 17.2 % (ref 19.6–45.3)
MCH RBC QN AUTO: 29.4 PG (ref 26.6–33)
MCHC RBC AUTO-ENTMCNC: 33.7 G/DL (ref 31.5–35.7)
MCV RBC AUTO: 87.2 FL (ref 79–97)
MONOCYTES # BLD AUTO: 0.8 10*3/MM3 (ref 0.1–0.9)
MONOCYTES NFR BLD AUTO: 10.8 % (ref 5–12)
NEUTROPHILS NFR BLD AUTO: 5.03 10*3/MM3 (ref 1.7–7)
NEUTROPHILS NFR BLD AUTO: 68.1 % (ref 42.7–76)
NRBC BLD AUTO-RTO: 0 /100 WBC (ref 0–0.2)
NT-PROBNP SERPL-MCNC: 3403 PG/ML (ref 0–900)
PLATELET # BLD AUTO: 206 10*3/MM3 (ref 140–450)
PMV BLD AUTO: 10.4 FL (ref 6–12)
POTASSIUM SERPL-SCNC: 4.2 MMOL/L (ref 3.5–5.2)
PROT SERPL-MCNC: 6.5 G/DL (ref 6–8.5)
QT INTERVAL: 443 MS
QTC INTERVAL: 454 MS
RBC # BLD AUTO: 4.76 10*6/MM3 (ref 4.14–5.8)
SODIUM SERPL-SCNC: 137 MMOL/L (ref 136–145)
TRIGL SERPL-MCNC: 151 MG/DL (ref 0–150)
TROPONIN T DELTA: 0 NG/L
TROPONIN T SERPL HS-MCNC: 19 NG/L
TSH SERPL DL<=0.05 MIU/L-ACNC: 3.37 UIU/ML (ref 0.27–4.2)
VLDLC SERPL-MCNC: 26 MG/DL (ref 5–40)
WBC NRBC COR # BLD AUTO: 7.39 10*3/MM3 (ref 3.4–10.8)

## 2024-03-11 PROCEDURE — 82948 REAGENT STRIP/BLOOD GLUCOSE: CPT

## 2024-03-11 PROCEDURE — 83036 HEMOGLOBIN GLYCOSYLATED A1C: CPT | Performed by: INTERNAL MEDICINE

## 2024-03-11 PROCEDURE — 25010000002 ENOXAPARIN PER 10 MG: Performed by: INTERNAL MEDICINE

## 2024-03-11 PROCEDURE — 84484 ASSAY OF TROPONIN QUANT: CPT | Performed by: NURSE PRACTITIONER

## 2024-03-11 PROCEDURE — 85025 COMPLETE CBC W/AUTO DIFF WBC: CPT | Performed by: NURSE PRACTITIONER

## 2024-03-11 PROCEDURE — 71046 X-RAY EXAM CHEST 2 VIEWS: CPT

## 2024-03-11 PROCEDURE — 99223 1ST HOSP IP/OBS HIGH 75: CPT | Performed by: INTERNAL MEDICINE

## 2024-03-11 PROCEDURE — 93010 ELECTROCARDIOGRAM REPORT: CPT | Performed by: INTERNAL MEDICINE

## 2024-03-11 PROCEDURE — 83880 ASSAY OF NATRIURETIC PEPTIDE: CPT | Performed by: NURSE PRACTITIONER

## 2024-03-11 PROCEDURE — 84443 ASSAY THYROID STIM HORMONE: CPT | Performed by: NURSE PRACTITIONER

## 2024-03-11 PROCEDURE — 25810000003 SODIUM CHLORIDE 0.9 % SOLUTION: Performed by: INTERNAL MEDICINE

## 2024-03-11 PROCEDURE — 80053 COMPREHEN METABOLIC PANEL: CPT | Performed by: NURSE PRACTITIONER

## 2024-03-11 PROCEDURE — 80061 LIPID PANEL: CPT | Performed by: INTERNAL MEDICINE

## 2024-03-11 PROCEDURE — 93005 ELECTROCARDIOGRAM TRACING: CPT | Performed by: INTERNAL MEDICINE

## 2024-03-11 RX ORDER — INSULIN LISPRO 100 [IU]/ML
2-7 INJECTION, SOLUTION INTRAVENOUS; SUBCUTANEOUS
Status: DISCONTINUED | OUTPATIENT
Start: 2024-03-11 | End: 2024-03-14

## 2024-03-11 RX ORDER — LEVOTHYROXINE SODIUM 0.07 MG/1
1 TABLET ORAL DAILY
COMMUNITY

## 2024-03-11 RX ORDER — SODIUM CHLORIDE 0.9 % (FLUSH) 0.9 %
10 SYRINGE (ML) INJECTION EVERY 12 HOURS SCHEDULED
Status: DISCONTINUED | OUTPATIENT
Start: 2024-03-11 | End: 2024-03-14

## 2024-03-11 RX ORDER — AMLODIPINE BESYLATE 10 MG/1
10 TABLET ORAL DAILY
Status: DISCONTINUED | OUTPATIENT
Start: 2024-03-11 | End: 2024-03-14

## 2024-03-11 RX ORDER — SODIUM CHLORIDE 9 MG/ML
40 INJECTION, SOLUTION INTRAVENOUS AS NEEDED
Status: DISCONTINUED | OUTPATIENT
Start: 2024-03-11 | End: 2024-03-14

## 2024-03-11 RX ORDER — ACETAMINOPHEN 500 MG
1000 TABLET ORAL EVERY 6 HOURS PRN
Status: DISCONTINUED | OUTPATIENT
Start: 2024-03-11 | End: 2024-03-14

## 2024-03-11 RX ORDER — SODIUM CHLORIDE 0.9 % (FLUSH) 0.9 %
10 SYRINGE (ML) INJECTION AS NEEDED
Status: DISCONTINUED | OUTPATIENT
Start: 2024-03-11 | End: 2024-03-14

## 2024-03-11 RX ORDER — ATORVASTATIN CALCIUM 40 MG/1
1 TABLET, FILM COATED ORAL NIGHTLY
COMMUNITY

## 2024-03-11 RX ORDER — BISACODYL 5 MG/1
5 TABLET, DELAYED RELEASE ORAL DAILY PRN
Status: DISCONTINUED | OUTPATIENT
Start: 2024-03-11 | End: 2024-03-11 | Stop reason: SDUPTHER

## 2024-03-11 RX ORDER — SODIUM CHLORIDE 9 MG/ML
100 INJECTION, SOLUTION INTRAVENOUS CONTINUOUS
Status: DISCONTINUED | OUTPATIENT
Start: 2024-03-11 | End: 2024-03-14

## 2024-03-11 RX ORDER — CETIRIZINE HYDROCHLORIDE 10 MG/1
10 TABLET ORAL DAILY
Status: DISCONTINUED | OUTPATIENT
Start: 2024-03-11 | End: 2024-03-14

## 2024-03-11 RX ORDER — NITROGLYCERIN 0.4 MG/1
0.4 TABLET SUBLINGUAL
Status: DISCONTINUED | OUTPATIENT
Start: 2024-03-11 | End: 2024-03-14

## 2024-03-11 RX ORDER — NICOTINE POLACRILEX 4 MG
15 LOZENGE BUCCAL
Status: DISCONTINUED | OUTPATIENT
Start: 2024-03-11 | End: 2024-03-14

## 2024-03-11 RX ORDER — BISACODYL 10 MG
10 SUPPOSITORY, RECTAL RECTAL DAILY PRN
Status: DISCONTINUED | OUTPATIENT
Start: 2024-03-11 | End: 2024-03-14

## 2024-03-11 RX ORDER — AMLODIPINE BESYLATE 10 MG/1
10 TABLET ORAL DAILY
COMMUNITY
End: 2024-03-19 | Stop reason: HOSPADM

## 2024-03-11 RX ORDER — AMOXICILLIN 250 MG
2 CAPSULE ORAL 2 TIMES DAILY PRN
Status: DISCONTINUED | OUTPATIENT
Start: 2024-03-11 | End: 2024-03-14

## 2024-03-11 RX ORDER — LEVOTHYROXINE SODIUM 0.07 MG/1
75 TABLET ORAL
Status: DISCONTINUED | OUTPATIENT
Start: 2024-03-12 | End: 2024-03-14

## 2024-03-11 RX ORDER — POLYETHYLENE GLYCOL 3350 17 G/17G
17 POWDER, FOR SOLUTION ORAL DAILY PRN
Status: DISCONTINUED | OUTPATIENT
Start: 2024-03-11 | End: 2024-03-14

## 2024-03-11 RX ORDER — AMOXICILLIN 250 MG
2 CAPSULE ORAL 2 TIMES DAILY PRN
Status: DISCONTINUED | OUTPATIENT
Start: 2024-03-11 | End: 2024-03-11 | Stop reason: SDUPTHER

## 2024-03-11 RX ORDER — IBUPROFEN 600 MG/1
1 TABLET ORAL
Status: DISCONTINUED | OUTPATIENT
Start: 2024-03-11 | End: 2024-03-14

## 2024-03-11 RX ORDER — BISACODYL 10 MG
10 SUPPOSITORY, RECTAL RECTAL DAILY PRN
Status: DISCONTINUED | OUTPATIENT
Start: 2024-03-11 | End: 2024-03-11 | Stop reason: SDUPTHER

## 2024-03-11 RX ORDER — ENOXAPARIN SODIUM 100 MG/ML
40 INJECTION SUBCUTANEOUS EVERY 12 HOURS SCHEDULED
Status: DISCONTINUED | OUTPATIENT
Start: 2024-03-11 | End: 2024-03-14

## 2024-03-11 RX ORDER — BUSPIRONE HYDROCHLORIDE 5 MG/1
1 TABLET ORAL 2 TIMES DAILY PRN
COMMUNITY

## 2024-03-11 RX ORDER — BISACODYL 5 MG/1
5 TABLET, DELAYED RELEASE ORAL DAILY PRN
Status: DISCONTINUED | OUTPATIENT
Start: 2024-03-11 | End: 2024-03-14

## 2024-03-11 RX ORDER — LISINOPRIL 20 MG/1
20 TABLET ORAL DAILY
Status: DISCONTINUED | OUTPATIENT
Start: 2024-03-11 | End: 2024-03-14

## 2024-03-11 RX ORDER — POLYETHYLENE GLYCOL 3350 17 G/17G
17 POWDER, FOR SOLUTION ORAL DAILY PRN
Status: DISCONTINUED | OUTPATIENT
Start: 2024-03-11 | End: 2024-03-11 | Stop reason: SDUPTHER

## 2024-03-11 RX ORDER — ATORVASTATIN CALCIUM 20 MG/1
40 TABLET, FILM COATED ORAL NIGHTLY
Status: DISCONTINUED | OUTPATIENT
Start: 2024-03-11 | End: 2024-03-14

## 2024-03-11 RX ORDER — DEXTROSE MONOHYDRATE 25 G/50ML
25 INJECTION, SOLUTION INTRAVENOUS
Status: DISCONTINUED | OUTPATIENT
Start: 2024-03-11 | End: 2024-03-14

## 2024-03-11 RX ORDER — BUSPIRONE HYDROCHLORIDE 5 MG/1
5 TABLET ORAL 2 TIMES DAILY PRN
Status: DISCONTINUED | OUTPATIENT
Start: 2024-03-11 | End: 2024-03-14

## 2024-03-11 RX ADMIN — Medication 10 ML: at 22:55

## 2024-03-11 RX ADMIN — ATORVASTATIN CALCIUM 40 MG: 20 TABLET, FILM COATED ORAL at 21:08

## 2024-03-11 RX ADMIN — ENOXAPARIN SODIUM 40 MG: 100 INJECTION SUBCUTANEOUS at 21:08

## 2024-03-11 RX ADMIN — Medication 10 ML: at 22:54

## 2024-03-11 RX ADMIN — SODIUM CHLORIDE 100 ML/HR: 9 INJECTION, SOLUTION INTRAVENOUS at 22:54

## 2024-03-11 NOTE — H&P
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  He has a history of obesity diabetes hypertension.  On prior CAT scans have showed calcium in all 3 of his coronaries.  He has known aortic stenosis there are some question about whether it is bicuspid or not.  His age would suggest it would be a bicuspid lesion.    He was doing well until about 4 days ago he noticed when he was going up the stairs at the [employer] which he goes about every 6 weeks that he started to get discomfort in his chest as well as short of breath.  He had to stop on his way up the stairs rest it went away then he completed it and it started coming back again.  He has really felt fatigued quickly here recently.  He has not had any PND orthopnea he has not noticed edema.  No bleeding difficulty no history of lung or kidney problems he works in  for the city    Past Medical History:   Diagnosis Date    Diabetes mellitus     Heart murmur     Hyperlipidemia     Hypertension     Skin abnormalities     ble  uses a cream    Swollen tonsil     left side       Past Surgical History:   Procedure Laterality Date    COLONOSCOPY      ENDOSCOPY      TONSILLECTOMY Left 12/3/2019    Procedure: LEFT TONSIL BIOPSY;  Surgeon: Lebron Turong MD;  Location: [facility];  Service: ENT    WISDOM TOOTH EXTRACTION         Medications Prior to Admission   Medication Sig Dispense Refill Last Dose    acetaminophen (TYLENOL) 500 MG tablet Take 2 tablets by mouth Every 6 (Six) Hours As Needed for Mild Pain.       amLODIPine (NORVASC) 10 MG tablet Take 1 tablet by mouth Daily.       atorvastatin  (LIPITOR) 40 MG tablet Take 1 tablet by mouth Every Night.       busPIRone (BUSPAR) 5 MG tablet Take 1 tablet by mouth 2 (Two) Times a Day As Needed (anxiety).       cetirizine (zyrTEC) 10 MG tablet Take 1 tablet by mouth Daily.       levothyroxine (SYNTHROID, LEVOTHROID) 75 MCG tablet Take 1 tablet by mouth Daily.       lisinopril (PRINIVIL,ZESTRIL) 40 MG tablet Take 0.5 tablets by mouth Daily.       metFORMIN (GLUCOPHAGE) 1000 MG tablet Take 1 tablet by mouth 2 (Two) Times a Day With Meals.          Current Meds  No current facility-administered medications on file prior to encounter.     Current Outpatient Medications on File Prior to Encounter   Medication Sig Dispense Refill    acetaminophen (TYLENOL) 500 MG tablet Take 2 tablets by mouth Every 6 (Six) Hours As Needed for Mild Pain.      amLODIPine (NORVASC) 10 MG tablet Take 1 tablet by mouth Daily.      atorvastatin (LIPITOR) 40 MG tablet Take 1 tablet by mouth Every Night.      busPIRone (BUSPAR) 5 MG tablet Take 1 tablet by mouth 2 (Two) Times a Day As Needed (anxiety).      cetirizine (zyrTEC) 10 MG tablet Take 1 tablet by mouth Daily.      levothyroxine (SYNTHROID, LEVOTHROID) 75 MCG tablet Take 1 tablet by mouth Daily.      lisinopril (PRINIVIL,ZESTRIL) 40 MG tablet Take 0.5 tablets by mouth Daily.      metFORMIN (GLUCOPHAGE) 1000 MG tablet Take 1 tablet by mouth 2 (Two) Times a Day With Meals.      [DISCONTINUED] buPROPion HCl (WELLBUTRIN XL PO) Take 1 tablet by mouth Every Night.      [DISCONTINUED] Chlorcyclizine-Pseudoephed (STAHIST AD) 25-60 MG tablet Take 1 tablet by mouth As Needed.      [DISCONTINUED] HYDROcodone-acetaminophen (NORCO) 5-325 MG per tablet Take 1 tablet by mouth Every 4 (Four) Hours As Needed for Moderate Pain  (pain) for up to 15 doses. 15 tablet 0    [DISCONTINUED] HYDROcodone-acetaminophen (NORCO) 5-325 MG per tablet Take 1-2 tablets by mouth Every 4 (Four) Hours As Needed (Pain). 15 tablet 0    [DISCONTINUED] ibuprofen  "(ADVIL,MOTRIN) 200 MG tablet Take 400 mg by mouth Every 6 (Six) Hours As Needed for Mild Pain .      [DISCONTINUED] mometasone (NASONEX) 50 MCG/ACT nasal spray 2 sprays into the nostril(s) as directed by provider Every Night.         Social History     Socioeconomic History    Marital status:    Tobacco Use    Smoking status: Never    Smokeless tobacco: Never   Substance and Sexual Activity    Alcohol use: Yes     Comment: 1 monthly    Drug use: No       Family Hx: Non-contributory    REVIEW OF SYSTEMS:   ROS was performed and is negative except as outlined in HPI     REVIEW OF SYSTEMS:   CONSTITUTIONAL: No weight loss, fever, chills, weakness or fatigue.   HEENT: Eyes: No visual loss, blurred vision, double vision or yellow sclerae. Ears, Nose, Throat: No hearing loss, sneezing, congestion, runny nose or sore throat.   SKIN: No rash or itching.     RESPIRATORY: No shortness of breath, hemoptysis, cough or sputum.   GASTROINTESTINAL: No anorexia, nausea, vomiting or diarrhea. No abdominal pain, bright red blood per rectum or melena.  NEUROLOGICAL: No headache, dizziness, syncope, paralysis, numbness or tingling in the extremities.  MUSCULOSKELETAL: No muscle, back pain, joint pain or stiffness.   HEMATOLOGIC: No anemia, bleeding or bruising.   LYMPHATICS: No enlarged nodes.  PSYCHIATRIC: No history of depression, anxiety, hallucinations.   ENDOCRINOLOGIC: No reports of sweating, cold or heat intolerance. No polyuria or polydipsia.        Objective:     Vitals:    03/11/24 1700   BP: 108/73   BP Location: Right arm   Patient Position: Sitting   Pulse: 50   Resp: 20   Temp: 97.9 °F (36.6 °C)   TempSrc: Oral   SpO2: 98%   Weight: 132 kg (290 lb 4.8 oz)   Height: 172.7 cm (68\")     Body mass index is 44.14 kg/m².  Flowsheet Rows      Flowsheet Row First Filed Value   Admission Height 172.7 cm (68\") Documented at 03/11/2024 1700   Admission Weight 132 kg (290 lb 4.8 oz) Documented at 03/11/2024 1700      "       General Appearance:    Alert, oriented x 3, in no acute distress   Head:    Normocephalic, without obvious abnormality, atraumatic   Ears:    Ears appear intact with no abnormalities noted   Throat:   No oral lesions, dentition good   Neck:   No adenopathy, supple, trachea midline, no thyromegaly, no carotid bruit, no JVD   Lungs:    Breath sounds are equal and  clear to auscultation    Heart:   Normal S1 and S2, RRR, 3 out of 6 mid-to-late peaking systolic ejection murmur/gallop or rub   Abdomen:    Normal bowel sounds, obese, soft non-tender, non-distended, no organomegaly, no guarding   Extremities:   Moves all extremities well, no edema, no cyanosis, no redness   Pulses:   Pulses palpable and equal bilaterally. Normal radial pulses   Skin:   No bleeding, bruising or rash   Lymph nodes:   No palpable adenopathy     I personally viewed and interpreted the patient's EKG/Telemetry data    Assessment:  There are no hospital problems to display for this patient.      Plan: Well it seems that he is got unstable angina he is got at least moderate aortic stenosis.  He also looks like he is in some kind of heart block it looks like Mobitz 1 but will get a watch it carefully here I think we should echo him probably plan on a cardiac cath.  And will get a check out his rhythm.  He is a pretty sick cedrick right now was several potentially significant medical problems going on with his valve his coronaries and his rhythm

## 2024-03-11 NOTE — Clinical Note
Hemostasis started on the right radial artery. R-Band was used in achieving hemostasis. Radial compression device applied to vessel. Hemostasis achieved successfully. Closure device additional comment: Tr band with 14cc of air

## 2024-03-11 NOTE — Clinical Note
Hemostasis started on the right brachial vein. Manual pressure applied to vessel. Manual pressure was held by AE. Manual pressure was held for 5 min. Hemostasis achieved successfully.

## 2024-03-12 ENCOUNTER — APPOINTMENT (OUTPATIENT)
Dept: CARDIOLOGY | Facility: HOSPITAL | Age: 59
DRG: 217 | End: 2024-03-12
Payer: COMMERCIAL

## 2024-03-12 LAB
ANION GAP SERPL CALCULATED.3IONS-SCNC: 12.3 MMOL/L (ref 5–15)
AORTIC ARCH: 3.2 CM
AORTIC DIMENSIONLESS INDEX: 0.3 (DI)
ASCENDING AORTA: 3.3 CM
BH CV ECHO MEAS - ACS: 1.13 CM
BH CV ECHO MEAS - AO MAX PG: 79.8 MMHG
BH CV ECHO MEAS - AO MEAN PG: 40.3 MMHG
BH CV ECHO MEAS - AO ROOT DIAM: 3.8 CM
BH CV ECHO MEAS - AO V2 MAX: 446.6 CM/SEC
BH CV ECHO MEAS - AO V2 VTI: 83.8 CM
BH CV ECHO MEAS - AVA(I,D): 1.17 CM2
BH CV ECHO MEAS - EDV(CUBED): 92.8 ML
BH CV ECHO MEAS - EDV(MOD-SP2): 95 ML
BH CV ECHO MEAS - EDV(MOD-SP4): 161 ML
BH CV ECHO MEAS - EF(MOD-BP): 72.9 %
BH CV ECHO MEAS - EF(MOD-SP2): 61.1 %
BH CV ECHO MEAS - EF(MOD-SP4): 82 %
BH CV ECHO MEAS - ESV(CUBED): 30.1 ML
BH CV ECHO MEAS - ESV(MOD-SP2): 37 ML
BH CV ECHO MEAS - ESV(MOD-SP4): 29 ML
BH CV ECHO MEAS - FS: 31.3 %
BH CV ECHO MEAS - IVS/LVPW: 1.45 CM
BH CV ECHO MEAS - IVSD: 2.13 CM
BH CV ECHO MEAS - LV DIASTOLIC VOL/BSA (35-75): 67.5 CM2
BH CV ECHO MEAS - LV MASS(C)D: 369 GRAMS
BH CV ECHO MEAS - LV MAX PG: 8.8 MMHG
BH CV ECHO MEAS - LV MEAN PG: 5 MMHG
BH CV ECHO MEAS - LV SYSTOLIC VOL/BSA (12-30): 12.2 CM2
BH CV ECHO MEAS - LV V1 MAX: 148.3 CM/SEC
BH CV ECHO MEAS - LV V1 VTI: 27.7 CM
BH CV ECHO MEAS - LVIDD: 4.5 CM
BH CV ECHO MEAS - LVIDS: 3.1 CM
BH CV ECHO MEAS - LVOT AREA: 3.6 CM2
BH CV ECHO MEAS - LVOT DIAM: 2.13 CM
BH CV ECHO MEAS - LVPWD: 1.47 CM
BH CV ECHO MEAS - MV DEC SLOPE: 1198 CM/SEC2
BH CV ECHO MEAS - MV MAX PG: 24.7 MMHG
BH CV ECHO MEAS - MV MEAN PG: 6.7 MMHG
BH CV ECHO MEAS - MV P1/2T: 56.9 MSEC
BH CV ECHO MEAS - MV V2 VTI: 49 CM
BH CV ECHO MEAS - MVA(P1/2T): 3.9 CM2
BH CV ECHO MEAS - MVA(VTI): 2.01 CM2
BH CV ECHO MEAS - PA ACC TIME: 0.1 SEC
BH CV ECHO MEAS - PA V2 MAX: 122.6 CM/SEC
BH CV ECHO MEAS - RV MAX PG: 3.6 MMHG
BH CV ECHO MEAS - RV V1 MAX: 95.4 CM/SEC
BH CV ECHO MEAS - RV V1 VTI: 19.6 CM
BH CV ECHO MEAS - SI(MOD-SP2): 24.3 ML/M2
BH CV ECHO MEAS - SI(MOD-SP4): 55.3 ML/M2
BH CV ECHO MEAS - SV(LVOT): 98.4 ML
BH CV ECHO MEAS - SV(MOD-SP2): 58 ML
BH CV ECHO MEAS - SV(MOD-SP4): 132 ML
BH CV ECHO MEAS - TAPSE (>1.6): 2.08 CM
BH CV XLRA - RV BASE: 3 CM
BH CV XLRA - RV LENGTH: 6.8 CM
BH CV XLRA - RV MID: 2.9 CM
BUN SERPL-MCNC: 17 MG/DL (ref 6–20)
BUN/CREAT SERPL: 17 (ref 7–25)
CALCIUM SPEC-SCNC: 9 MG/DL (ref 8.6–10.5)
CHLORIDE SERPL-SCNC: 104 MMOL/L (ref 98–107)
CO2 SERPL-SCNC: 22.7 MMOL/L (ref 22–29)
CREAT SERPL-MCNC: 1 MG/DL (ref 0.76–1.27)
EGFRCR SERPLBLD CKD-EPI 2021: 87.8 ML/MIN/1.73
GLUCOSE BLDC GLUCOMTR-MCNC: 111 MG/DL (ref 70–130)
GLUCOSE BLDC GLUCOMTR-MCNC: 140 MG/DL (ref 70–130)
GLUCOSE BLDC GLUCOMTR-MCNC: 181 MG/DL (ref 70–130)
GLUCOSE SERPL-MCNC: 132 MG/DL (ref 65–99)
LEFT ATRIUM VOLUME INDEX: 32.3 ML/M2
POTASSIUM SERPL-SCNC: 4.2 MMOL/L (ref 3.5–5.2)
SINUS: 2.9 CM
SODIUM SERPL-SCNC: 139 MMOL/L (ref 136–145)
STJ: 2.8 CM

## 2024-03-12 PROCEDURE — 93456 R HRT CORONARY ARTERY ANGIO: CPT | Performed by: INTERNAL MEDICINE

## 2024-03-12 PROCEDURE — B2111ZZ FLUOROSCOPY OF MULTIPLE CORONARY ARTERIES USING LOW OSMOLAR CONTRAST: ICD-10-PCS | Performed by: INTERNAL MEDICINE

## 2024-03-12 PROCEDURE — 99233 SBSQ HOSP IP/OBS HIGH 50: CPT | Performed by: INTERNAL MEDICINE

## 2024-03-12 PROCEDURE — 4A023N8 MEASUREMENT OF CARDIAC SAMPLING AND PRESSURE, BILATERAL, PERCUTANEOUS APPROACH: ICD-10-PCS | Performed by: INTERNAL MEDICINE

## 2024-03-12 PROCEDURE — 82810 BLOOD GASES O2 SAT ONLY: CPT

## 2024-03-12 PROCEDURE — 85014 HEMATOCRIT: CPT

## 2024-03-12 PROCEDURE — 99152 MOD SED SAME PHYS/QHP 5/>YRS: CPT | Performed by: INTERNAL MEDICINE

## 2024-03-12 PROCEDURE — 82948 REAGENT STRIP/BLOOD GLUCOSE: CPT

## 2024-03-12 PROCEDURE — 25810000003 SODIUM CHLORIDE 0.9 % SOLUTION: Performed by: INTERNAL MEDICINE

## 2024-03-12 PROCEDURE — 80048 BASIC METABOLIC PNL TOTAL CA: CPT | Performed by: NURSE PRACTITIONER

## 2024-03-12 PROCEDURE — C1894 INTRO/SHEATH, NON-LASER: HCPCS | Performed by: INTERNAL MEDICINE

## 2024-03-12 PROCEDURE — 25010000002 FENTANYL CITRATE (PF) 50 MCG/ML SOLUTION: Performed by: INTERNAL MEDICINE

## 2024-03-12 PROCEDURE — 63710000001 INSULIN LISPRO (HUMAN) PER 5 UNITS: Performed by: INTERNAL MEDICINE

## 2024-03-12 PROCEDURE — 25010000002 HEPARIN (PORCINE) PER 1000 UNITS: Performed by: INTERNAL MEDICINE

## 2024-03-12 PROCEDURE — 25510000001 IOPAMIDOL PER 1 ML: Performed by: INTERNAL MEDICINE

## 2024-03-12 PROCEDURE — 25010000002 ENOXAPARIN PER 10 MG: Performed by: INTERNAL MEDICINE

## 2024-03-12 PROCEDURE — C1769 GUIDE WIRE: HCPCS | Performed by: INTERNAL MEDICINE

## 2024-03-12 PROCEDURE — 93306 TTE W/DOPPLER COMPLETE: CPT

## 2024-03-12 PROCEDURE — 25510000001 PERFLUTREN (DEFINITY) 8.476 MG IN SODIUM CHLORIDE (PF) 0.9 % 10 ML INJECTION: Performed by: INTERNAL MEDICINE

## 2024-03-12 PROCEDURE — 25010000002 MIDAZOLAM PER 1 MG: Performed by: INTERNAL MEDICINE

## 2024-03-12 PROCEDURE — 93306 TTE W/DOPPLER COMPLETE: CPT | Performed by: INTERNAL MEDICINE

## 2024-03-12 PROCEDURE — 85018 HEMOGLOBIN: CPT

## 2024-03-12 RX ORDER — VERAPAMIL HYDROCHLORIDE 2.5 MG/ML
INJECTION, SOLUTION INTRAVENOUS
Status: DISCONTINUED | OUTPATIENT
Start: 2024-03-12 | End: 2024-03-12 | Stop reason: HOSPADM

## 2024-03-12 RX ORDER — FENTANYL CITRATE 50 UG/ML
INJECTION, SOLUTION INTRAMUSCULAR; INTRAVENOUS
Status: DISCONTINUED | OUTPATIENT
Start: 2024-03-12 | End: 2024-03-12 | Stop reason: HOSPADM

## 2024-03-12 RX ORDER — LIDOCAINE HYDROCHLORIDE 20 MG/ML
INJECTION, SOLUTION INFILTRATION; PERINEURAL
Status: DISCONTINUED | OUTPATIENT
Start: 2024-03-12 | End: 2024-03-12 | Stop reason: HOSPADM

## 2024-03-12 RX ORDER — MIDAZOLAM HYDROCHLORIDE 1 MG/ML
INJECTION INTRAMUSCULAR; INTRAVENOUS
Status: DISCONTINUED | OUTPATIENT
Start: 2024-03-12 | End: 2024-03-12 | Stop reason: HOSPADM

## 2024-03-12 RX ORDER — HEPARIN SODIUM 1000 [USP'U]/ML
INJECTION, SOLUTION INTRAVENOUS; SUBCUTANEOUS
Status: DISCONTINUED | OUTPATIENT
Start: 2024-03-12 | End: 2024-03-12 | Stop reason: HOSPADM

## 2024-03-12 RX ADMIN — AMLODIPINE BESYLATE 10 MG: 10 TABLET ORAL at 10:24

## 2024-03-12 RX ADMIN — Medication 10 ML: at 21:58

## 2024-03-12 RX ADMIN — ENOXAPARIN SODIUM 40 MG: 100 INJECTION SUBCUTANEOUS at 21:57

## 2024-03-12 RX ADMIN — SODIUM CHLORIDE 100 ML/HR: 9 INJECTION, SOLUTION INTRAVENOUS at 12:47

## 2024-03-12 RX ADMIN — ATORVASTATIN CALCIUM 40 MG: 20 TABLET, FILM COATED ORAL at 21:57

## 2024-03-12 RX ADMIN — INSULIN LISPRO 2 UNITS: 100 INJECTION, SOLUTION INTRAVENOUS; SUBCUTANEOUS at 21:57

## 2024-03-12 RX ADMIN — ENOXAPARIN SODIUM 40 MG: 100 INJECTION SUBCUTANEOUS at 10:23

## 2024-03-12 RX ADMIN — Medication 10 ML: at 10:00

## 2024-03-12 RX ADMIN — CETIRIZINE HYDROCHLORIDE 10 MG: 10 TABLET ORAL at 10:24

## 2024-03-12 RX ADMIN — LISINOPRIL 20 MG: 20 TABLET ORAL at 10:24

## 2024-03-12 RX ADMIN — LEVOTHYROXINE SODIUM 75 MCG: 75 TABLET ORAL at 06:17

## 2024-03-12 RX ADMIN — PERFLUTREN 2 ML: 6.52 INJECTION, SUSPENSION INTRAVENOUS at 09:36

## 2024-03-12 NOTE — PLAN OF CARE
Goal Outcome Evaluation:  Plan of Care Reviewed With: patient           Outcome Evaluation: Pt A&Ox4, VSS, n/c of pain. Left & right heart cath today no PCI. Pt scheduled for [facility] jerrod. Will update POC as needed.

## 2024-03-12 NOTE — PLAN OF CARE
Goal Outcome Evaluation:         Patient had no complaints of chest pain, npo @mn, EKG showed Mobitz 1 2nd degree HB.  Will continue to monitor.

## 2024-03-12 NOTE — CONSULTS
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 Patient had been experiencing shortness of breath and chest pain with exertion while going up stairs a few days ago. Patient said this was new, so he went to a wellness center where EKG was performed. Dr. Wagner reviewed the EKG, which was abnormal and concerning for ischemia. Dr. Wagner reached out to Dr. Alvarez and patient was admitted to our facility yesterday for further cardiac workup. TTE was performed and patient was noted to have severe aortic stenosis, mild-moderate mitral valve stenosis, and hyperdynamic systolic function. On previous CT scans, patient was noted to have calcified coronary arteries. Cardiac catheterization is being performed this afternoon to further evaluate coronaries. Patient denies ever smoking, he does not drink but once a year, and he does not take blood thinners at home. Patient denies any pulmonary or kidney disease.     Review of Systems   Constitutional:  Positive for fatigue.   Respiratory:  Positive for chest tightness and shortness of breath.    Cardiovascular:  Positive for chest pain and palpitations.   All other systems reviewed and are negative.       Past Medical History:   Diagnosis Date    Diabetes mellitus     Heart murmur     Hyperlipidemia     Hypertension     Skin abnormalities     ble  uses a cream    Swollen tonsil     left side     Past Surgical History:   Procedure Laterality Date    COLONOSCOPY      ENDOSCOPY      TONSILLECTOMY Left 12/3/2019    Procedure: LEFT TONSIL BIOPSY;  Surgeon: Lebron Truong MD;  Location: [facility]  "MAIN OR;  Service: ENT    WISDOM TOOTH EXTRACTION       Family History   Problem Relation Age of Onset    Malig Hyperthermia Neg Hx      Social History     Tobacco Use    Smoking status: Never    Smokeless tobacco: Never   Vaping Use    Vaping status: Never Used   Substance Use Topics    Alcohol use: Yes     Comment: 1 monthly    Drug use: No     Medications Prior to Admission   Medication Sig Dispense Refill Last Dose    acetaminophen (TYLENOL) 500 MG tablet Take 2 tablets by mouth Every 6 (Six) Hours As Needed for Mild Pain.       amLODIPine (NORVASC) 10 MG tablet Take 1 tablet by mouth Daily.       atorvastatin (LIPITOR) 40 MG tablet Take 1 tablet by mouth Every Night.       busPIRone (BUSPAR) 5 MG tablet Take 1 tablet by mouth 2 (Two) Times a Day As Needed (anxiety).       cetirizine (zyrTEC) 10 MG tablet Take 1 tablet by mouth Daily.       levothyroxine (SYNTHROID, LEVOTHROID) 75 MCG tablet Take 1 tablet by mouth Daily.       lisinopril (PRINIVIL,ZESTRIL) 40 MG tablet Take 0.5 tablets by mouth Daily.       metFORMIN (GLUCOPHAGE) 1000 MG tablet Take 1 tablet by mouth 2 (Two) Times a Day With Meals.        amLODIPine, 10 mg, Oral, Daily  atorvastatin, 40 mg, Oral, Nightly  cetirizine, 10 mg, Oral, Daily  enoxaparin, 40 mg, Subcutaneous, Q12H  insulin lispro, 2-7 Units, Subcutaneous, 4x Daily AC & at Bedtime  levothyroxine, 75 mcg, Oral, Q AM  lisinopril, 20 mg, Oral, Daily  sodium chloride, 10 mL, Intravenous, Q12H  sodium chloride, 10 mL, Intravenous, Q12H      Allergies:  Patient has no known allergies.    Objective      Vital Signs  Temp:  [97.9 °F (36.6 °C)-98.8 °F (37.1 °C)] 98.4 °F (36.9 °C)  Heart Rate:  [50-82] 62  Resp:  [18-20] 18  BP: (108-131)/(73-81) 128/78    Flowsheet Rows      Flowsheet Row First Filed Value   Admission Height 172.7 cm (68\") Documented at 03/11/2024 1700   Admission Weight 132 kg (290 lb 4.8 oz) Documented at 03/11/2024 1700          172.7 cm (68\")    Physical Exam  Vitals " reviewed.   Constitutional:       General: He is not in acute distress.     Appearance: He is not toxic-appearing.   HENT:      Head: Normocephalic and atraumatic.      Mouth/Throat:      Mouth: Mucous membranes are moist.      Pharynx: Oropharynx is clear. No posterior oropharyngeal erythema.   Eyes:      Extraocular Movements: Extraocular movements intact.      Conjunctiva/sclera: Conjunctivae normal.      Pupils: Pupils are equal, round, and reactive to light.   Neck:      Vascular: No carotid bruit.   Cardiovascular:      Rate and Rhythm: Normal rate and regular rhythm.      Heart sounds: Murmur heard.   Pulmonary:      Effort: Pulmonary effort is normal.      Breath sounds: Normal breath sounds.   Musculoskeletal:         General: Normal range of motion.      Cervical back: Normal range of motion.      Right lower leg: No edema.      Left lower leg: No edema.   Skin:     General: Skin is warm and dry.      Coloration: Skin is not pale.      Findings: No rash.   Neurological:      General: No focal deficit present.      Mental Status: He is alert and oriented to person, place, and time.   Psychiatric:         Mood and Affect: Mood normal.         Behavior: Behavior normal.         Thought Content: Thought content normal.         Judgment: Judgment normal.         Results Review:   Lab Results (last 24 hours)       Procedure Component Value Units Date/Time    POC Glucose Once [[id number]]  (Abnormal) Collected: 03/12/24 1119    Specimen: Blood Updated: 03/12/24 1121     Glucose 140 mg/dL     POC Glucose Once [[id number]]  (Normal) Collected: 03/12/24 0555    Specimen: Blood Updated: 03/12/24 0556     Glucose 111 mg/dL     Basic Metabolic Panel [[id number]]  (Abnormal) Collected: 03/12/24 0326    Specimen: Blood Updated: 03/12/24 0411     Glucose 132 mg/dL      BUN 17 mg/dL      Creatinine 1.00 mg/dL      Sodium 139 mmol/L      Potassium 4.2 mmol/L      Chloride 104 mmol/L      CO2 22.7 mmol/L      Calcium 9.0 mg/dL       BUN/Creatinine Ratio 17.0     Anion Gap 12.3 mmol/L      eGFR 87.8 mL/min/1.73     Narrative:      GFR Normal >60  Chronic Kidney Disease <60  Kidney Failure <15      POC Glucose Once [[id number]]  (Normal) Collected: 03/11/24 2132    Specimen: Blood Updated: 03/11/24 2134     Glucose 128 mg/dL     High Sensitivity Troponin T 2Hr [[id number]]  (Normal) Collected: 03/11/24 2007    Specimen: Blood Updated: 03/11/24 2111     HS Troponin T 19 ng/L      Troponin T Delta 0 ng/L     Narrative:      High Sensitive Troponin T Reference Range:  <14.0 ng/L- Negative Female for AMI  <22.0 ng/L- Negative Male for AMI  >=14 - Abnormal Female indicating possible myocardial injury.  >=22 - Abnormal Male indicating possible myocardial injury.   Clinicians would have to utilize clinical acumen, EKG, Troponin, and serial changes to determine if it is an Acute Myocardial Infarction or myocardial injury due to an underlying chronic condition.         Hemoglobin A1c [[id number]]  (Abnormal) Collected: 03/11/24 1739    Specimen: Blood Updated: 03/11/24 2051     Hemoglobin A1C 7.30 %     Narrative:      Hemoglobin A1C Ranges:    Increased Risk for Diabetes  5.7% to 6.4%  Diabetes                     >= 6.5%  Diabetic Goal                < 7.0%    BNP [[id number]]  (Abnormal) Collected: 03/11/24 1739    Specimen: Blood Updated: 03/11/24 1823     proBNP 3,403.0 pg/mL     Narrative:      This assay is used as an aid in the diagnosis of individuals suspected of having heart failure. It can be used as an aid in the diagnosis of acute decompensated heart failure (ADHF) in patients presenting with signs and symptoms of ADHF to the emergency department (ED). In addition, NT-proBNP of <300 pg/mL indicates ADHF is not likely.    Age Range Result Interpretation  NT-proBNP Concentration (pg/mL:      <50             Positive            >450                   Gray                 300-450                    Negative             <300    50-75            Positive            >900                  Gray                300-900                  Negative            <300      >75             Positive            >1800                  Gray                300-1800                  Negative            <300    High Sensitivity Troponin T [[id number]]  (Normal) Collected: 03/11/24 1739    Specimen: Blood Updated: 03/11/24 1823     HS Troponin T 19 ng/L     Narrative:      High Sensitive Troponin T Reference Range:  <14.0 ng/L- Negative Female for AMI  <22.0 ng/L- Negative Male for AMI  >=14 - Abnormal Female indicating possible myocardial injury.  >=22 - Abnormal Male indicating possible myocardial injury.   Clinicians would have to utilize clinical acumen, EKG, Troponin, and serial changes to determine if it is an Acute Myocardial Infarction or myocardial injury due to an underlying chronic condition.         TSH [[id number]]  (Normal) Collected: 03/11/24 1739    Specimen: Blood Updated: 03/11/24 1823     TSH 3.370 uIU/mL     Comprehensive Metabolic Panel [[id number]]  (Abnormal) Collected: 03/11/24 1739    Specimen: Blood Updated: 03/11/24 1817     Glucose 137 mg/dL      BUN 20 mg/dL      Creatinine 1.31 mg/dL      Sodium 137 mmol/L      Potassium 4.2 mmol/L      Chloride 103 mmol/L      CO2 21.2 mmol/L      Calcium 9.6 mg/dL      Total Protein 6.5 g/dL      Albumin 4.0 g/dL      ALT (SGPT) 25 U/L      AST (SGOT) <5 U/L      Alkaline Phosphatase 95 U/L      Total Bilirubin 1.1 mg/dL      Globulin 2.5 gm/dL      A/G Ratio 1.6 g/dL      BUN/Creatinine Ratio 15.3     Anion Gap 12.8 mmol/L      eGFR 63.5 mL/min/1.73     Narrative:      GFR Normal >60  Chronic Kidney Disease <60  Kidney Failure <15      Lipid Panel [[id number]]  (Abnormal) Collected: 03/11/24 1739    Specimen: Blood Updated: 03/11/24 1817     Total Cholesterol 120 mg/dL      Triglycerides 151 mg/dL      HDL Cholesterol 27 mg/dL      LDL Cholesterol  67 mg/dL      VLDL Cholesterol 26 mg/dL      LDL/HDL Ratio 2.33     Narrative:      Cholesterol Reference Ranges  (U.S. Department of Health and Human Services ATP III Classifications)    Desirable          <200 mg/dL  Borderline High    200-239 mg/dL  High Risk          >240 mg/dL      Triglyceride Reference Ranges  (U.S. Department of Health and Human Services ATP III Classifications)    Normal           <150 mg/dL  Borderline High  150-199 mg/dL  High             200-499 mg/dL  Very High        >500 mg/dL    HDL Reference Ranges  (U.S. Department of Health and Human Services ATP III Classifications)    Low     <40 mg/dl (major risk factor for CHD)  High    >60 mg/dl ('negative' risk factor for CHD)        LDL Reference Ranges  (U.S. Department of Health and Human Services ATP III Classifications)    Optimal          <100 mg/dL  Near Optimal     100-129 mg/dL  Borderline High  130-159 mg/dL  High             160-189 mg/dL  Very High        >189 mg/dL    CBC Auto Differential [[id number]]  (Abnormal) Collected: 03/11/24 1739    Specimen: Blood Updated: 03/11/24 1756     WBC 7.39 10*3/mm3      RBC 4.76 10*6/mm3      Hemoglobin 14.0 g/dL      Hematocrit 41.5 %      MCV 87.2 fL      MCH 29.4 pg      MCHC 33.7 g/dL      RDW 13.0 %      RDW-SD 40.9 fl      MPV 10.4 fL      Platelets 206 10*3/mm3      Neutrophil % 68.1 %      Lymphocyte % 17.2 %      Monocyte % 10.8 %      Eosinophil % 3.2 %      Basophil % 0.4 %      Immature Grans % 0.3 %      Neutrophils, Absolute 5.03 10*3/mm3      Lymphocytes, Absolute 1.27 10*3/mm3      Monocytes, Absolute 0.80 10*3/mm3      Eosinophils, Absolute 0.24 10*3/mm3      Basophils, Absolute 0.03 10*3/mm3      Immature Grans, Absolute 0.02 10*3/mm3      nRBC 0.0 /100 WBC                 Assessment & Plan       Unstable angina      Assessment & Plan    Severe aortic valve stenosis   Hypertension  DM II -- A1c 7.3  Hyperlipdemia  Hx tonsillar cancer (treated with resection and chemotherapy -- 2020)    Patient denies any active chest pain or shortness of  breath. I answered all questions with verbalized understanding. Patient is scheduled for cardiac catheterization this afternoon, we will see what this shows.     Dr. West to review films/studies and give definitive recommendation regarding surgical intervention.     Savita Pierre PA-C  03/12/24  12:51 EDT

## 2024-03-12 NOTE — PROGRESS NOTES
"Hayden Michaud  1965 58 y.o.  [id number]      Patient Care Team:  Carol Vanessa MD as PCP - General (General Practice)  Savanah Mitchell MD as Consulting Physician (Radiation Oncology)  Lebron Truong MD as Consulting Physician (Otolaryngology)    CC: Obesity, diabetes, coronary disease, probable aortic bicuspid valve, aortic stenosis aortic insufficiency mitral annular calcification    Interval History: Doing well overnight      Objective   Vital Signs  Temp:  [97.9 °F (36.6 °C)-98.8 °F (37.1 °C)] 98.4 °F (36.9 °C)  Heart Rate:  [50-82] 62  Resp:  [18-20] 18  BP: (108-131)/(73-81) 128/78  No intake or output data in the 24 hours ending 03/12/24 1435  Flowsheet Rows      Flowsheet Row First Filed Value   Admission Height 172.7 cm (68\") Documented at 03/11/2024 1700   Admission Weight 132 kg (290 lb 4.8 oz) Documented at 03/11/2024 1700            Physical Exam:   General Appearance:    Alert,oriented, in no acute distress   Lungs:     Clear to auscultation,BS are equal    Heart:    Normal S1 and S2, RRR with 3 out of 6 late peaking systolic ejection and a 1 out of 6 diastolic decrescendo murmur, gallop or rub   HEENT:    Sclerae are clear, no JVD or adenopathy   Abdomen:     Normal bowel sounds, soft nontender, nondistended, no HSM   Extremities:   Moves all extremities well, no edema, no cyanosis, no             Redness, no rash     Medication Review:      amLODIPine, 10 mg, Oral, Daily  atorvastatin, 40 mg, Oral, Nightly  cetirizine, 10 mg, Oral, Daily  enoxaparin, 40 mg, Subcutaneous, Q12H  insulin lispro, 2-7 Units, Subcutaneous, 4x Daily AC & at Bedtime  levothyroxine, 75 mcg, Oral, Q AM  lisinopril, 20 mg, Oral, Daily  sodium chloride, 10 mL, Intravenous, Q12H  sodium chloride, 10 mL, Intravenous, Q12H      sodium chloride, 100 mL/hr, Last Rate: 100 mL/hr (03/12/24 1247)          I reviewed the patient's new clinical results.  I personally viewed and interpreted the patient's EKG/Telemetry " data    Assessment/Plan  Active Hospital Problems    Diagnosis  POA    **Unstable angina [I20.0]  Yes      Resolved Hospital Problems   No resolved problems to display.       Will on his echo his aortic valve seems to have significantly worsened since was last seen but he also has a lot of mitral annular calcification some mitral stenosis a 7 mm gradient.  He on his CT scans in the past when we were doing it for his head neck cancer was noted to have calcium in all 3 of his coronaries.  I think that we need to get a left and right heart cath on him to evaluate his coronaries I do not think we need to cross his aortic valve.  He needs something done to the aortic valve at a minimum.  We talked at length about mechanical or bioprosthetic valve did not really come to a conclusion.    He also has second-degree heart block and it looks like may have had a few episodes of transient complete heart block last night I am going to have the arrhythmia service see him if that is really the case he is going to need a pacer probably before surgery    Wally Alvarze MD  03/12/24  14:35 EDT

## 2024-03-13 ENCOUNTER — APPOINTMENT (OUTPATIENT)
Dept: CT IMAGING | Facility: HOSPITAL | Age: 59
DRG: 217 | End: 2024-03-13
Payer: COMMERCIAL

## 2024-03-13 ENCOUNTER — APPOINTMENT (OUTPATIENT)
Dept: CARDIOLOGY | Facility: HOSPITAL | Age: 59
DRG: 217 | End: 2024-03-13
Payer: COMMERCIAL

## 2024-03-13 PROBLEM — I35.0 AORTIC VALVE STENOSIS: Status: ACTIVE | Noted: 2024-03-11

## 2024-03-13 LAB
ABO GROUP BLD: NORMAL
ALBUMIN SERPL-MCNC: 4.5 G/DL (ref 3.5–5.2)
ALBUMIN/GLOB SERPL: 1.8 G/DL
ALP SERPL-CCNC: 102 U/L (ref 39–117)
ALT SERPL W P-5'-P-CCNC: 32 U/L (ref 1–41)
ANION GAP SERPL CALCULATED.3IONS-SCNC: 12 MMOL/L (ref 5–15)
ANION GAP SERPL CALCULATED.3IONS-SCNC: 12 MMOL/L (ref 5–15)
APTT PPP: 28.2 SECONDS (ref 22.7–35.4)
ARTERIAL PATENCY WRIST A: POSITIVE
AST SERPL-CCNC: 20 U/L (ref 1–40)
ATMOSPHERIC PRESS: 747 MMHG
BASE EXCESS BLDA CALC-SCNC: 1 MMOL/L (ref 0–2)
BASOPHILS # BLD AUTO: 0.04 10*3/MM3 (ref 0–0.2)
BASOPHILS NFR BLD AUTO: 0.6 % (ref 0–1.5)
BDY SITE: ABNORMAL
BH CV ECHO MEAS - AO MAX PG: 76.2 MMHG
BH CV ECHO MEAS - AO MEAN PG: 43 MMHG
BH CV ECHO MEAS - AO V2 MAX: 436.4 CM/SEC
BH CV ECHO MEAS - AO V2 VTI: 77.6 CM
BH CV ECHO MEAS - MV MAX PG: 16.1 MMHG
BH CV ECHO MEAS - MV MEAN PG: 5 MMHG
BH CV ECHO MEAS - MV V2 VTI: 46.3 CM
BH CV ECHO SHUNT ASSESSMENT PERFORMED (HIDDEN SCRIPTING): 1
BH CV XLRA MEAS LEFT DIST CCA EDV: -26.7 CM/SEC
BH CV XLRA MEAS LEFT DIST CCA PSV: -100.6 CM/SEC
BH CV XLRA MEAS LEFT DIST ICA EDV: -31.7 CM/SEC
BH CV XLRA MEAS LEFT DIST ICA PSV: -90.7 CM/SEC
BH CV XLRA MEAS LEFT ICA/CCA RATIO: 0.9
BH CV XLRA MEAS LEFT MID ICA EDV: -32.3 CM/SEC
BH CV XLRA MEAS LEFT MID ICA PSV: -90.1 CM/SEC
BH CV XLRA MEAS LEFT PROX CCA EDV: 27.1 CM/SEC
BH CV XLRA MEAS LEFT PROX CCA PSV: 139.5 CM/SEC
BH CV XLRA MEAS LEFT PROX ECA EDV: -22 CM/SEC
BH CV XLRA MEAS LEFT PROX ECA PSV: -135.6 CM/SEC
BH CV XLRA MEAS LEFT PROX ICA EDV: -23.6 CM/SEC
BH CV XLRA MEAS LEFT PROX ICA PSV: -73.3 CM/SEC
BH CV XLRA MEAS LEFT PROX SCLA PSV: 154.9 CM/SEC
BH CV XLRA MEAS LEFT VERTEBRAL A EDV: -15.4 CM/SEC
BH CV XLRA MEAS LEFT VERTEBRAL A PSV: -37.9 CM/SEC
BH CV XLRA MEAS RIGHT DIST CCA EDV: -30.6 CM/SEC
BH CV XLRA MEAS RIGHT DIST CCA PSV: -98.8 CM/SEC
BH CV XLRA MEAS RIGHT DIST ICA EDV: -28 CM/SEC
BH CV XLRA MEAS RIGHT DIST ICA PSV: -68.6 CM/SEC
BH CV XLRA MEAS RIGHT ICA/CCA RATIO: 0.73
BH CV XLRA MEAS RIGHT MID ICA EDV: -30.7 CM/SEC
BH CV XLRA MEAS RIGHT MID ICA PSV: -70.2 CM/SEC
BH CV XLRA MEAS RIGHT PROX CCA EDV: 21.2 CM/SEC
BH CV XLRA MEAS RIGHT PROX CCA PSV: 102.7 CM/SEC
BH CV XLRA MEAS RIGHT PROX ECA EDV: -14.9 CM/SEC
BH CV XLRA MEAS RIGHT PROX ECA PSV: -145 CM/SEC
BH CV XLRA MEAS RIGHT PROX ICA EDV: -24.7 CM/SEC
BH CV XLRA MEAS RIGHT PROX ICA PSV: -72.4 CM/SEC
BH CV XLRA MEAS RIGHT PROX SCLA PSV: 158.8 CM/SEC
BH CV XLRA MEAS RIGHT VERTEBRAL A EDV: -12.5 CM/SEC
BH CV XLRA MEAS RIGHT VERTEBRAL A PSV: -33.6 CM/SEC
BILIRUB SERPL-MCNC: 0.8 MG/DL (ref 0–1.2)
BLD GP AB SCN SERPL QL: NEGATIVE
BUN SERPL-MCNC: 18 MG/DL (ref 6–20)
BUN SERPL-MCNC: 21 MG/DL (ref 6–20)
BUN/CREAT SERPL: 16.7 (ref 7–25)
BUN/CREAT SERPL: 16.9 (ref 7–25)
CALCIUM SPEC-SCNC: 9.4 MG/DL (ref 8.6–10.5)
CALCIUM SPEC-SCNC: 9.8 MG/DL (ref 8.6–10.5)
CHLORIDE SERPL-SCNC: 104 MMOL/L (ref 98–107)
CHLORIDE SERPL-SCNC: 105 MMOL/L (ref 98–107)
CHOLEST SERPL-MCNC: 122 MG/DL (ref 0–200)
CLOSE TME COLL+ADP + EPINEP PNL BLD: 97 % (ref 86–100)
CO2 BLDA-SCNC: 26.5 MMOL/L (ref 23–27)
CO2 SERPL-SCNC: 23 MMOL/L (ref 22–29)
CO2 SERPL-SCNC: 25 MMOL/L (ref 22–29)
CREAT SERPL-MCNC: 1.08 MG/DL (ref 0.76–1.27)
CREAT SERPL-MCNC: 1.24 MG/DL (ref 0.76–1.27)
DEPRECATED RDW RBC AUTO: 38.8 FL (ref 37–54)
EGFRCR SERPLBLD CKD-EPI 2021: 67.8 ML/MIN/1.73
EGFRCR SERPLBLD CKD-EPI 2021: 80 ML/MIN/1.73
EOSINOPHIL # BLD AUTO: 0.26 10*3/MM3 (ref 0–0.4)
EOSINOPHIL NFR BLD AUTO: 3.9 % (ref 0.3–6.2)
ERYTHROCYTE [DISTWIDTH] IN BLOOD BY AUTOMATED COUNT: 12.6 % (ref 12.3–15.4)
GLOBULIN UR ELPH-MCNC: 2.5 GM/DL
GLUCOSE BLDC GLUCOMTR-MCNC: 127 MG/DL (ref 70–130)
GLUCOSE BLDC GLUCOMTR-MCNC: 133 MG/DL (ref 70–130)
GLUCOSE BLDC GLUCOMTR-MCNC: 136 MG/DL (ref 70–130)
GLUCOSE BLDC GLUCOMTR-MCNC: 140 MG/DL (ref 70–130)
GLUCOSE SERPL-MCNC: 121 MG/DL (ref 65–99)
GLUCOSE SERPL-MCNC: 135 MG/DL (ref 65–99)
HCO3 BLDA-SCNC: 25.3 MMOL/L (ref 22–28)
HCT VFR BLD AUTO: 43.1 % (ref 37.5–51)
HCT VFR BLDA CALC: 39 % (ref 38–51)
HCT VFR BLDA CALC: 39 % (ref 38–51)
HDLC SERPL-MCNC: 27 MG/DL (ref 40–60)
HEMODILUTION: NO
HGB BLD-MCNC: 14.4 G/DL (ref 13–17.7)
HGB BLDA-MCNC: 13.3 G/DL (ref 12–17)
HGB BLDA-MCNC: 13.3 G/DL (ref 12–17)
IMM GRANULOCYTES # BLD AUTO: 0.01 10*3/MM3 (ref 0–0.05)
IMM GRANULOCYTES NFR BLD AUTO: 0.2 % (ref 0–0.5)
INR PPP: 1.13 (ref 0.9–1.1)
LDLC SERPL CALC-MCNC: 72 MG/DL (ref 0–100)
LDLC/HDLC SERPL: 2.6 {RATIO}
LEFT ARM BP: NORMAL MMHG
LYMPHOCYTES # BLD AUTO: 1.34 10*3/MM3 (ref 0.7–3.1)
LYMPHOCYTES NFR BLD AUTO: 20.3 % (ref 19.6–45.3)
MAGNESIUM SERPL-MCNC: 1.7 MG/DL (ref 1.6–2.6)
MCH RBC QN AUTO: 28.5 PG (ref 26.6–33)
MCHC RBC AUTO-ENTMCNC: 33.4 G/DL (ref 31.5–35.7)
MCV RBC AUTO: 85.2 FL (ref 79–97)
MODALITY: ABNORMAL
MONOCYTES # BLD AUTO: 0.61 10*3/MM3 (ref 0.1–0.9)
MONOCYTES NFR BLD AUTO: 9.3 % (ref 5–12)
NEUTROPHILS NFR BLD AUTO: 4.33 10*3/MM3 (ref 1.7–7)
NEUTROPHILS NFR BLD AUTO: 65.7 % (ref 42.7–76)
NRBC BLD AUTO-RTO: 0 /100 WBC (ref 0–0.2)
PCO2 BLDA: 38.6 MM HG (ref 35–45)
PH BLDA: 7.42 PH UNITS (ref 7.35–7.45)
PLATELET # BLD AUTO: 254 10*3/MM3 (ref 140–450)
PMV BLD AUTO: 9.8 FL (ref 6–12)
PO2 BLDA: 74.2 MM HG (ref 80–100)
POTASSIUM SERPL-SCNC: 4.3 MMOL/L (ref 3.5–5.2)
POTASSIUM SERPL-SCNC: 4.5 MMOL/L (ref 3.5–5.2)
PROT SERPL-MCNC: 7 G/DL (ref 6–8.5)
PROTHROMBIN TIME: 14.7 SECONDS (ref 11.7–14.2)
QT INTERVAL: 467 MS
QTC INTERVAL: 422 MS
RBC # BLD AUTO: 5.06 10*6/MM3 (ref 4.14–5.8)
RH BLD: POSITIVE
RIGHT ARM BP: NORMAL MMHG
SAO2 % BLDA: 59 % (ref 95–98)
SAO2 % BLDA: 93 % (ref 95–98)
SAO2 % BLDCOA: 95.1 % (ref 92–98.5)
SARS-COV-2 RNA RESP QL NAA+PROBE: NOT DETECTED
SODIUM SERPL-SCNC: 140 MMOL/L (ref 136–145)
SODIUM SERPL-SCNC: 141 MMOL/L (ref 136–145)
T&S EXPIRATION DATE: NORMAL
TOTAL RATE: 18 BREATHS/MINUTE
TRIGL SERPL-MCNC: 124 MG/DL (ref 0–150)
VLDLC SERPL-MCNC: 23 MG/DL (ref 5–40)
WBC NRBC COR # BLD AUTO: 6.59 10*3/MM3 (ref 3.4–10.8)

## 2024-03-13 PROCEDURE — 36600 WITHDRAWAL OF ARTERIAL BLOOD: CPT | Performed by: NURSE PRACTITIONER

## 2024-03-13 PROCEDURE — 86850 RBC ANTIBODY SCREEN: CPT | Performed by: NURSE PRACTITIONER

## 2024-03-13 PROCEDURE — 93320 DOPPLER ECHO COMPLETE: CPT

## 2024-03-13 PROCEDURE — 83735 ASSAY OF MAGNESIUM: CPT | Performed by: NURSE PRACTITIONER

## 2024-03-13 PROCEDURE — 93312 ECHO TRANSESOPHAGEAL: CPT

## 2024-03-13 PROCEDURE — 93325 DOPPLER ECHO COLOR FLOW MAPG: CPT

## 2024-03-13 PROCEDURE — 85610 PROTHROMBIN TIME: CPT | Performed by: NURSE PRACTITIONER

## 2024-03-13 PROCEDURE — 25010000002 FENTANYL CITRATE (PF) 50 MCG/ML SOLUTION: Performed by: INTERNAL MEDICINE

## 2024-03-13 PROCEDURE — 93320 DOPPLER ECHO COMPLETE: CPT | Performed by: INTERNAL MEDICINE

## 2024-03-13 PROCEDURE — 93005 ELECTROCARDIOGRAM TRACING: CPT

## 2024-03-13 PROCEDURE — 99232 SBSQ HOSP IP/OBS MODERATE 35: CPT | Performed by: INTERNAL MEDICINE

## 2024-03-13 PROCEDURE — 86920 COMPATIBILITY TEST SPIN: CPT

## 2024-03-13 PROCEDURE — 25010000002 MIDAZOLAM PER 1 MG: Performed by: INTERNAL MEDICINE

## 2024-03-13 PROCEDURE — 93010 ELECTROCARDIOGRAM REPORT: CPT | Performed by: INTERNAL MEDICINE

## 2024-03-13 PROCEDURE — 85576 BLOOD PLATELET AGGREGATION: CPT | Performed by: NURSE PRACTITIONER

## 2024-03-13 PROCEDURE — 85730 THROMBOPLASTIN TIME PARTIAL: CPT | Performed by: NURSE PRACTITIONER

## 2024-03-13 PROCEDURE — 86900 BLOOD TYPING SEROLOGIC ABO: CPT | Performed by: NURSE PRACTITIONER

## 2024-03-13 PROCEDURE — 80053 COMPREHEN METABOLIC PANEL: CPT | Performed by: NURSE PRACTITIONER

## 2024-03-13 PROCEDURE — 85025 COMPLETE CBC W/AUTO DIFF WBC: CPT | Performed by: NURSE PRACTITIONER

## 2024-03-13 PROCEDURE — 93880 EXTRACRANIAL BILAT STUDY: CPT

## 2024-03-13 PROCEDURE — 82803 BLOOD GASES ANY COMBINATION: CPT | Performed by: NURSE PRACTITIONER

## 2024-03-13 PROCEDURE — 87635 SARS-COV-2 COVID-19 AMP PRB: CPT | Performed by: NURSE PRACTITIONER

## 2024-03-13 PROCEDURE — 71250 CT THORAX DX C-: CPT

## 2024-03-13 PROCEDURE — 93312 ECHO TRANSESOPHAGEAL: CPT | Performed by: INTERNAL MEDICINE

## 2024-03-13 PROCEDURE — 80061 LIPID PANEL: CPT | Performed by: NURSE PRACTITIONER

## 2024-03-13 PROCEDURE — 93325 DOPPLER ECHO COLOR FLOW MAPG: CPT | Performed by: INTERNAL MEDICINE

## 2024-03-13 PROCEDURE — 86901 BLOOD TYPING SEROLOGIC RH(D): CPT | Performed by: NURSE PRACTITIONER

## 2024-03-13 PROCEDURE — 82948 REAGENT STRIP/BLOOD GLUCOSE: CPT

## 2024-03-13 RX ORDER — UREA 10 %
3 LOTION (ML) TOPICAL ONCE
Status: COMPLETED | OUTPATIENT
Start: 2024-03-13 | End: 2024-03-13

## 2024-03-13 RX ORDER — SODIUM CHLORIDE 9 MG/ML
INJECTION, SOLUTION INTRAVENOUS
Status: COMPLETED | OUTPATIENT
Start: 2024-03-13 | End: 2024-03-13

## 2024-03-13 RX ORDER — SODIUM CHLORIDE 9 MG/ML
40 INJECTION, SOLUTION INTRAVENOUS AS NEEDED
Status: CANCELLED | OUTPATIENT
Start: 2024-03-13

## 2024-03-13 RX ORDER — TEMAZEPAM 15 MG/1
15 CAPSULE ORAL NIGHTLY PRN
Status: DISCONTINUED | OUTPATIENT
Start: 2024-03-13 | End: 2024-03-14

## 2024-03-13 RX ORDER — CHLORHEXIDINE GLUCONATE 500 MG/1
1 CLOTH TOPICAL EVERY 12 HOURS
Status: DISCONTINUED | OUTPATIENT
Start: 2024-03-13 | End: 2024-03-14

## 2024-03-13 RX ORDER — MIDAZOLAM HYDROCHLORIDE 1 MG/ML
INJECTION INTRAMUSCULAR; INTRAVENOUS
Status: COMPLETED | OUTPATIENT
Start: 2024-03-13 | End: 2024-03-13

## 2024-03-13 RX ORDER — LIDOCAINE HYDROCHLORIDE 20 MG/ML
SOLUTION OROPHARYNGEAL
Status: COMPLETED | OUTPATIENT
Start: 2024-03-13 | End: 2024-03-13

## 2024-03-13 RX ORDER — FENTANYL CITRATE 50 UG/ML
INJECTION, SOLUTION INTRAMUSCULAR; INTRAVENOUS
Status: COMPLETED | OUTPATIENT
Start: 2024-03-13 | End: 2024-03-13

## 2024-03-13 RX ORDER — SODIUM CHLORIDE 0.9 % (FLUSH) 0.9 %
10 SYRINGE (ML) INJECTION EVERY 12 HOURS SCHEDULED
Status: CANCELLED | OUTPATIENT
Start: 2024-03-13

## 2024-03-13 RX ORDER — ALPRAZOLAM 0.25 MG/1
0.25 TABLET ORAL EVERY 8 HOURS PRN
Status: DISCONTINUED | OUTPATIENT
Start: 2024-03-13 | End: 2024-03-14

## 2024-03-13 RX ORDER — CHLORHEXIDINE GLUCONATE ORAL RINSE 1.2 MG/ML
15 SOLUTION DENTAL EVERY 12 HOURS SCHEDULED
Status: DISCONTINUED | OUTPATIENT
Start: 2024-03-13 | End: 2024-03-14

## 2024-03-13 RX ORDER — SODIUM CHLORIDE 0.9 % (FLUSH) 0.9 %
10 SYRINGE (ML) INJECTION AS NEEDED
Status: CANCELLED | OUTPATIENT
Start: 2024-03-13

## 2024-03-13 RX ADMIN — ALPRAZOLAM 0.25 MG: 0.25 TABLET ORAL at 21:59

## 2024-03-13 RX ADMIN — Medication 10 ML: at 10:38

## 2024-03-13 RX ADMIN — Medication 3 MG: at 00:17

## 2024-03-13 RX ADMIN — MIDAZOLAM 2 MG: 1 INJECTION INTRAMUSCULAR; INTRAVENOUS at 08:28

## 2024-03-13 RX ADMIN — LIDOCAINE HYDROCHLORIDE 10 ML: 20 SOLUTION OROPHARYNGEAL at 08:10

## 2024-03-13 RX ADMIN — FENTANYL CITRATE 25 MCG: 50 INJECTION, SOLUTION INTRAMUSCULAR; INTRAVENOUS at 08:27

## 2024-03-13 RX ADMIN — 0.12% CHLORHEXIDINE GLUCONATE 15 ML: 1.2 RINSE ORAL at 17:58

## 2024-03-13 RX ADMIN — AMLODIPINE BESYLATE 10 MG: 10 TABLET ORAL at 10:38

## 2024-03-13 RX ADMIN — CETIRIZINE HYDROCHLORIDE 10 MG: 10 TABLET ORAL at 10:38

## 2024-03-13 RX ADMIN — SODIUM CHLORIDE 50 ML/HR: 9 INJECTION, SOLUTION INTRAVENOUS at 08:09

## 2024-03-13 RX ADMIN — LEVOTHYROXINE SODIUM 75 MCG: 75 TABLET ORAL at 06:48

## 2024-03-13 RX ADMIN — MIDAZOLAM 1 MG: 1 INJECTION INTRAMUSCULAR; INTRAVENOUS at 08:41

## 2024-03-13 RX ADMIN — Medication 10 ML: at 21:56

## 2024-03-13 RX ADMIN — MIDAZOLAM 2 MG: 1 INJECTION INTRAMUSCULAR; INTRAVENOUS at 08:33

## 2024-03-13 RX ADMIN — MUPIROCIN 1 APPLICATION: 20 OINTMENT TOPICAL at 17:58

## 2024-03-13 RX ADMIN — FENTANYL CITRATE 25 MCG: 50 INJECTION, SOLUTION INTRAMUSCULAR; INTRAVENOUS at 08:41

## 2024-03-13 RX ADMIN — ATORVASTATIN CALCIUM 40 MG: 20 TABLET, FILM COATED ORAL at 21:55

## 2024-03-13 RX ADMIN — Medication 10 ML: at 10:39

## 2024-03-13 RX ADMIN — Medication 10 ML: at 21:55

## 2024-03-13 RX ADMIN — FENTANYL CITRATE 25 MCG: 50 INJECTION, SOLUTION INTRAMUSCULAR; INTRAVENOUS at 08:28

## 2024-03-13 RX ADMIN — LISINOPRIL 20 MG: 20 TABLET ORAL at 10:38

## 2024-03-13 RX ADMIN — MIDAZOLAM 2 MG: 1 INJECTION INTRAMUSCULAR; INTRAVENOUS at 08:27

## 2024-03-13 NOTE — PROGRESS NOTES
PFT evaluation completed. Patient does not qualify for Pre-op PFT with no smoking history and no pulmonary history.

## 2024-03-13 NOTE — PROGRESS NOTES
"Hayden Michaud  1965 58 y.o.  [id number]      Patient Care Team:  Carol Vanessa MD as PCP - General (General Practice)  Savanah Mitchell MD as Consulting Physician (Radiation Oncology)  Lebron Truong MD as Consulting Physician (Otolaryngology)    CC: Obesity, diabetes, coronary disease, probable aortic bicuspid valve, aortic stenosis aortic insufficiency mitral annular calcification, complete heart block, no severe coronary disease, normal LV function    Interval History: He did well      Objective   Vital Signs  Temp:  [97.8 °F (36.6 °C)-98.6 °F (37 °C)] 98 °F (36.7 °C)  Heart Rate:  [61-72] 61  Resp:  [11-25] 18  BP: (117-136)/(67-86) 117/67    Intake/Output Summary (Last 24 hours) at 3/13/2024 0753  Last data filed at 3/13/2024 0300  Gross per 24 hour   Intake 240 ml   Output 500 ml   Net -260 ml     Flowsheet Rows      Flowsheet Row First Filed Value   Admission Height 172.7 cm (68\") Documented at 03/11/2024 1700   Admission Weight 132 kg (290 lb 4.8 oz) Documented at 03/11/2024 1700            Physical Exam:   General Appearance:    Alert,oriented, in no acute distress   Lungs:     Clear to auscultation,BS are equal    Heart:    Normal S1 and S2, RRR with 3 out of 6 late peaking systolic ejection and a 1 out of 6 diastolic decrescendo murmur, gallop or rub   HEENT:    Sclerae are clear, no JVD or adenopathy   Abdomen:     Normal bowel sounds, soft nontender, nondistended, no HSM   Extremities:   Moves all extremities well, no edema, no cyanosis, no             Redness, no rash     Medication Review:      amLODIPine, 10 mg, Oral, Daily  atorvastatin, 40 mg, Oral, Nightly  cetirizine, 10 mg, Oral, Daily  enoxaparin, 40 mg, Subcutaneous, Q12H  insulin lispro, 2-7 Units, Subcutaneous, 4x Daily AC & at Bedtime  levothyroxine, 75 mcg, Oral, Q AM  lisinopril, 20 mg, Oral, Daily  sodium chloride, 10 mL, Intravenous, Q12H  sodium chloride, 10 mL, Intravenous, Q12H      sodium chloride, 100 mL/hr, Last Rate: " 100 mL/hr (03/12/24 1247)          I reviewed the patient's new clinical results.  I personally viewed and interpreted the patient's EKG/Telemetry data    Assessment/Plan  Active Hospital Problems    Diagnosis  POA    **Unstable angina [I20.0]  Yes      Resolved Hospital Problems   No resolved problems to display.     He has what appears to be severe aortic stenosis but he has a lot of mitral annular calcification received big V waves on his wedge tracing as well as on his right atrial tracing so we feel that he needs to have a MARTIN to evaluate his valves completely but he is going to require complex valve surgery.  We have asked Dr. Soares to see him.  The arrhythmia team is following him and they will decide on the timing of his pacemaker is some concern it could get moved during surgery    Wally Alvarez MD  03/13/24  07:53 EDT

## 2024-03-13 NOTE — PLAN OF CARE
Goal Outcome Evaluation:              Outcome Evaluation: Pt is A&O x4, on RA, VSS, up ad arron. Pt planned for first case tomorrow with Dr. West for an aortic valve repair/replacement. Pt and family educated and updated on poc.

## 2024-03-13 NOTE — PROGRESS NOTES
" LOS: 2 days   Patient Care Team:  Carol Vanessa MD as PCP - General (General Practice)  Savanah Mitchell MD as Consulting Physician (Radiation Oncology)  Lebron Truong MD as Consulting Physician (Otolaryngology)    Chief Complaint: Aortic stenosis    Subjective:  Symptoms:  No shortness of breath, cough or chest pain.    Diet:  Adequate intake.  No nausea or vomiting.    Activity level: Returning to normal.    Pain:  He complains of pain that is mild.  Pain is well controlled.      Vital Signs  Temp:  [97.8 °F (36.6 °C)-98.6 °F (37 °C)] 98 °F (36.7 °C)  Heart Rate:  [61-79] 76  Resp:  [11-25] 20  BP: ()/(58-92) 117/65  Body mass index is 43.43 kg/m².    Intake/Output Summary (Last 24 hours) at 3/13/2024 0942  Last data filed at 3/13/2024 0300  Gross per 24 hour   Intake 240 ml   Output 500 ml   Net -260 ml     No intake/output data recorded.          03/12/24  0620 03/12/24  0936 03/13/24  0300   Weight: 131 kg (288 lb 11.2 oz) 131 kg (288 lb) 130 kg (285 lb 9.6 oz)       Objective:  General Appearance:  Comfortable and in no acute distress.    Vital signs: (most recent): Blood pressure 112/76, pulse 78, temperature 98.5 °F (36.9 °C), temperature source Oral, resp. rate 18, height 172.7 cm (68\"), weight 130 kg (285 lb 9.6 oz), SpO2 95%.  Vital signs are normal.  No fever.    Output: Producing urine.    Lungs:  Normal effort and normal respiratory rate.  There are decreased breath sounds.    Heart: Bradycardia.  Irregular rhythm.  Positive for murmur.    Abdomen: Abdomen is soft.  Bowel sounds are normal.     Extremities: Normal range of motion.  There is dependent edema.    Pulses: Distal pulses are intact.    Neurological: Patient is alert and oriented to person, place and time.            Results Review:        WBC WBC   Date Value Ref Range Status   03/11/2024 7.39 3.40 - 10.80 10*3/mm3 Final      HGB Hemoglobin   Date Value Ref Range Status   03/12/2024 13.3 12.0 - 17.0 g/dL Final   03/12/2024 13.3 " "12.0 - 17.0 g/dL Final   03/11/2024 14.0 13.0 - 17.7 g/dL Final      HCT Hematocrit   Date Value Ref Range Status   03/12/2024 39 38 - 51 % Final   03/12/2024 39 38 - 51 % Final   03/11/2024 41.5 37.5 - 51.0 % Final      Platelets Platelets   Date Value Ref Range Status   03/11/2024 206 140 - 450 10*3/mm3 Final        PT/INR:  No results found for: \"PROTIME\"/No results found for: \"INR\"    Sodium Sodium   Date Value Ref Range Status   03/13/2024 140 136 - 145 mmol/L Final   03/12/2024 139 136 - 145 mmol/L Final   03/11/2024 137 136 - 145 mmol/L Final      Potassium Potassium   Date Value Ref Range Status   03/13/2024 4.3 3.5 - 5.2 mmol/L Final   03/12/2024 4.2 3.5 - 5.2 mmol/L Final   03/11/2024 4.2 3.5 - 5.2 mmol/L Final      Chloride Chloride   Date Value Ref Range Status   03/13/2024 105 98 - 107 mmol/L Final   03/12/2024 104 98 - 107 mmol/L Final   03/11/2024 103 98 - 107 mmol/L Final      Bicarbonate CO2   Date Value Ref Range Status   03/13/2024 23.0 22.0 - 29.0 mmol/L Final   03/12/2024 22.7 22.0 - 29.0 mmol/L Final   03/11/2024 21.2 (L) 22.0 - 29.0 mmol/L Final      BUN BUN   Date Value Ref Range Status   03/13/2024 18 6 - 20 mg/dL Final   03/12/2024 17 6 - 20 mg/dL Final   03/11/2024 20 6 - 20 mg/dL Final      Creatinine Creatinine   Date Value Ref Range Status   03/13/2024 1.08 0.76 - 1.27 mg/dL Final   03/12/2024 1.00 0.76 - 1.27 mg/dL Final   03/11/2024 1.31 (H) 0.76 - 1.27 mg/dL Final      Calcium Calcium   Date Value Ref Range Status   03/13/2024 9.4 8.6 - 10.5 mg/dL Final   03/12/2024 9.0 8.6 - 10.5 mg/dL Final   03/11/2024 9.6 8.6 - 10.5 mg/dL Final      Magnesium No results found for: \"MG\"       amLODIPine, 10 mg, Oral, Daily  atorvastatin, 40 mg, Oral, Nightly  cetirizine, 10 mg, Oral, Daily  enoxaparin, 40 mg, Subcutaneous, Q12H  insulin lispro, 2-7 Units, Subcutaneous, 4x Daily AC & at Bedtime  levothyroxine, 75 mcg, Oral, Q AM  lisinopril, 20 mg, Oral, Daily  sodium chloride, 10 mL, Intravenous, " Q12H  sodium chloride, 10 mL, Intravenous, Q12H      sodium chloride, 100 mL/hr, Last Rate: 100 mL/hr (03/12/24 1247)      Assessment & Plan  - Severe aortic valve stenosis   - Hypertension  - DM II -- A1c 7.3  - Hyperlipidemia  - Hx tonsillar cancer (treated with resection and chemotherapy -- 2020)  - intermittent 2nd/3rd degree heart block    Dr. West has reviewed the films and recommends AVR. Discussed the types and valves and patient would like to proceed with tissue valve  Preoperative work up initiated. Will also check a CT chest without contrast  Tentative plan for surgery tomorrow morning. No on call beta blocker ordered due to heart block  Questions answered with verbalized understanding    COURTNEY Banegas  03/13/24  09:42 EDT

## 2024-03-14 ENCOUNTER — ANCILLARY PROCEDURE (OUTPATIENT)
Dept: PERIOP | Facility: HOSPITAL | Age: 59
DRG: 217 | End: 2024-03-14
Payer: COMMERCIAL

## 2024-03-14 ENCOUNTER — APPOINTMENT (OUTPATIENT)
Dept: GENERAL RADIOLOGY | Facility: HOSPITAL | Age: 59
DRG: 217 | End: 2024-03-14
Payer: COMMERCIAL

## 2024-03-14 ENCOUNTER — ANESTHESIA EVENT (OUTPATIENT)
Dept: PERIOP | Facility: HOSPITAL | Age: 59
End: 2024-03-14
Payer: COMMERCIAL

## 2024-03-14 ENCOUNTER — ANESTHESIA (OUTPATIENT)
Dept: PERIOP | Facility: HOSPITAL | Age: 59
End: 2024-03-14
Payer: COMMERCIAL

## 2024-03-14 LAB
ACT BLD: 131 SECONDS (ref 82–152)
ACT BLD: 152 SECONDS (ref 82–152)
ACT BLD: 358 SECONDS (ref 82–152)
ACT BLD: 385 SECONDS (ref 82–152)
ACT BLD: 412 SECONDS (ref 82–152)
ACT BLD: 499 SECONDS (ref 82–152)
ALBUMIN SERPL-MCNC: 4.1 G/DL (ref 3.5–5.2)
ALBUMIN SERPL-MCNC: 4.1 G/DL (ref 3.5–5.2)
ANION GAP SERPL CALCULATED.3IONS-SCNC: 10.6 MMOL/L (ref 5–15)
ANION GAP SERPL CALCULATED.3IONS-SCNC: 12.7 MMOL/L (ref 5–15)
ANION GAP SERPL CALCULATED.3IONS-SCNC: 8 MMOL/L (ref 5–15)
APTT PPP: 24.8 SECONDS (ref 22.7–35.4)
ARTERIAL PATENCY WRIST A: ABNORMAL
ARTERIAL PATENCY WRIST A: ABNORMAL
ATMOSPHERIC PRESS: 746.1 MMHG
ATMOSPHERIC PRESS: 747.9 MMHG
BASE EXCESS BLDA CALC-SCNC: -1 MMOL/L (ref -5–5)
BASE EXCESS BLDA CALC-SCNC: -2 MMOL/L (ref -5–5)
BASE EXCESS BLDA CALC-SCNC: -2 MMOL/L (ref -5–5)
BASE EXCESS BLDA CALC-SCNC: -3 MMOL/L (ref -5–5)
BASE EXCESS BLDA CALC-SCNC: -3 MMOL/L (ref 0–2)
BASE EXCESS BLDA CALC-SCNC: -3.2 MMOL/L (ref 0–2)
BASOPHILS # BLD AUTO: 0.02 10*3/MM3 (ref 0–0.2)
BASOPHILS NFR BLD AUTO: 0.2 % (ref 0–1.5)
BDY SITE: ABNORMAL
BDY SITE: ABNORMAL
BUN SERPL-MCNC: 17 MG/DL (ref 6–20)
BUN SERPL-MCNC: 19 MG/DL (ref 6–20)
BUN SERPL-MCNC: 21 MG/DL (ref 6–20)
BUN/CREAT SERPL: 13.3 (ref 7–25)
BUN/CREAT SERPL: 14.6 (ref 7–25)
BUN/CREAT SERPL: 18.9 (ref 7–25)
CA-I BLD-MCNC: 4.8 MG/DL (ref 4.6–5.4)
CA-I BLDA-SCNC: ABNORMAL MMOL/L
CA-I SERPL ISE-MCNC: 1.19 MMOL/L (ref 1.15–1.35)
CALCIUM SPEC-SCNC: 7.8 MG/DL (ref 8.6–10.5)
CALCIUM SPEC-SCNC: 8.4 MG/DL (ref 8.6–10.5)
CALCIUM SPEC-SCNC: 9.1 MG/DL (ref 8.6–10.5)
CHLORIDE SERPL-SCNC: 105 MMOL/L (ref 98–107)
CHLORIDE SERPL-SCNC: 109 MMOL/L (ref 98–107)
CHLORIDE SERPL-SCNC: 111 MMOL/L (ref 98–107)
CO2 BLDA-SCNC: 23.2 MMOL/L (ref 23–27)
CO2 BLDA-SCNC: 23.5 MMOL/L (ref 23–27)
CO2 BLDA-SCNC: 24 MMOL/L (ref 24–29)
CO2 BLDA-SCNC: 24 MMOL/L (ref 24–29)
CO2 BLDA-SCNC: 26 MMOL/L (ref 24–29)
CO2 BLDA-SCNC: 27 MMOL/L (ref 24–29)
CO2 SERPL-SCNC: 20.4 MMOL/L (ref 22–29)
CO2 SERPL-SCNC: 23 MMOL/L (ref 22–29)
CO2 SERPL-SCNC: 24.3 MMOL/L (ref 22–29)
CREAT SERPL-MCNC: 1.11 MG/DL (ref 0.76–1.27)
CREAT SERPL-MCNC: 1.28 MG/DL (ref 0.76–1.27)
CREAT SERPL-MCNC: 1.3 MG/DL (ref 0.76–1.27)
DEPRECATED RDW RBC AUTO: 37.1 FL (ref 37–54)
DEPRECATED RDW RBC AUTO: 39.6 FL (ref 37–54)
EGFRCR SERPLBLD CKD-EPI 2021: 64.1 ML/MIN/1.73
EGFRCR SERPLBLD CKD-EPI 2021: 65.3 ML/MIN/1.73
EGFRCR SERPLBLD CKD-EPI 2021: 77.5 ML/MIN/1.73
EOSINOPHIL # BLD AUTO: 0.15 10*3/MM3 (ref 0–0.4)
EOSINOPHIL NFR BLD AUTO: 1.2 % (ref 0.3–6.2)
ERYTHROCYTE [DISTWIDTH] IN BLOOD BY AUTOMATED COUNT: 12.4 % (ref 12.3–15.4)
ERYTHROCYTE [DISTWIDTH] IN BLOOD BY AUTOMATED COUNT: 12.7 % (ref 12.3–15.4)
FIBRINOGEN PPP-MCNC: 481 MG/DL (ref 219–464)
GLUCOSE BLDC GLUCOMTR-MCNC: 103 MG/DL (ref 70–130)
GLUCOSE BLDC GLUCOMTR-MCNC: 130 MG/DL (ref 70–130)
GLUCOSE BLDC GLUCOMTR-MCNC: 130 MG/DL (ref 70–130)
GLUCOSE BLDC GLUCOMTR-MCNC: 134 MG/DL (ref 70–130)
GLUCOSE BLDC GLUCOMTR-MCNC: 135 MG/DL (ref 70–130)
GLUCOSE BLDC GLUCOMTR-MCNC: 136 MG/DL (ref 70–130)
GLUCOSE BLDC GLUCOMTR-MCNC: 139 MG/DL (ref 70–130)
GLUCOSE BLDC GLUCOMTR-MCNC: 140 MG/DL (ref 70–130)
GLUCOSE BLDC GLUCOMTR-MCNC: 141 MG/DL (ref 70–130)
GLUCOSE BLDC GLUCOMTR-MCNC: 142 MG/DL (ref 70–130)
GLUCOSE BLDC GLUCOMTR-MCNC: 145 MG/DL (ref 70–130)
GLUCOSE BLDC GLUCOMTR-MCNC: 150 MG/DL (ref 70–130)
GLUCOSE BLDC GLUCOMTR-MCNC: 150 MG/DL (ref 70–130)
GLUCOSE BLDC GLUCOMTR-MCNC: 154 MG/DL (ref 70–130)
GLUCOSE BLDC GLUCOMTR-MCNC: 162 MG/DL (ref 70–130)
GLUCOSE BLDC GLUCOMTR-MCNC: 165 MG/DL (ref 70–130)
GLUCOSE BLDC GLUCOMTR-MCNC: 187 MG/DL (ref 70–130)
GLUCOSE BLDC GLUCOMTR-MCNC: 61 MG/DL (ref 70–130)
GLUCOSE SERPL-MCNC: 126 MG/DL (ref 65–99)
GLUCOSE SERPL-MCNC: 144 MG/DL (ref 65–99)
GLUCOSE SERPL-MCNC: 148 MG/DL (ref 65–99)
HCO3 BLDA-SCNC: 22 MMOL/L (ref 22–28)
HCO3 BLDA-SCNC: 22.3 MMOL/L (ref 22–28)
HCO3 BLDA-SCNC: 22.5 MMOL/L (ref 22–26)
HCO3 BLDA-SCNC: 22.8 MMOL/L (ref 22–26)
HCO3 BLDA-SCNC: 24.7 MMOL/L (ref 22–26)
HCO3 BLDA-SCNC: 25.1 MMOL/L (ref 22–26)
HCO3 BLDA-SCNC: 25.5 MMOL/L (ref 22–26)
HCO3 BLDA-SCNC: 25.8 MMOL/L (ref 22–26)
HCT VFR BLD AUTO: 34.4 % (ref 37.5–51)
HCT VFR BLD AUTO: 36.3 % (ref 37.5–51)
HCT VFR BLDA CALC: 30 % (ref 38–51)
HCT VFR BLDA CALC: 30 % (ref 38–51)
HCT VFR BLDA CALC: 32 % (ref 38–51)
HCT VFR BLDA CALC: 37 % (ref 38–51)
HEMODILUTION: NO
HEMODILUTION: NO
HGB BLD-MCNC: 11.7 G/DL (ref 13–17.7)
HGB BLD-MCNC: 12.5 G/DL (ref 13–17.7)
HGB BLDA-MCNC: 10.2 G/DL (ref 12–17)
HGB BLDA-MCNC: 10.2 G/DL (ref 12–17)
HGB BLDA-MCNC: 10.9 G/DL (ref 12–17)
HGB BLDA-MCNC: 12.6 G/DL (ref 12–17)
IMM GRANULOCYTES # BLD AUTO: 0.03 10*3/MM3 (ref 0–0.05)
IMM GRANULOCYTES NFR BLD AUTO: 0.2 % (ref 0–0.5)
INHALED O2 CONCENTRATION: 100 %
INHALED O2 CONCENTRATION: 40 %
INR PPP: 1.32 (ref 0.9–1.1)
LYMPHOCYTES # BLD AUTO: 0.71 10*3/MM3 (ref 0.7–3.1)
LYMPHOCYTES NFR BLD AUTO: 5.8 % (ref 19.6–45.3)
MAGNESIUM SERPL-MCNC: 2.6 MG/DL (ref 1.6–2.6)
MAGNESIUM SERPL-MCNC: 3.1 MG/DL (ref 1.6–2.6)
MCH RBC QN AUTO: 28.7 PG (ref 26.6–33)
MCH RBC QN AUTO: 29.3 PG (ref 26.6–33)
MCHC RBC AUTO-ENTMCNC: 34 G/DL (ref 31.5–35.7)
MCHC RBC AUTO-ENTMCNC: 34.4 G/DL (ref 31.5–35.7)
MCV RBC AUTO: 83.4 FL (ref 79–97)
MCV RBC AUTO: 86 FL (ref 79–97)
MODALITY: ABNORMAL
MODALITY: ABNORMAL
MONOCYTES # BLD AUTO: 0.85 10*3/MM3 (ref 0.1–0.9)
MONOCYTES NFR BLD AUTO: 7 % (ref 5–12)
NEUTROPHILS NFR BLD AUTO: 10.44 10*3/MM3 (ref 1.7–7)
NEUTROPHILS NFR BLD AUTO: 85.6 % (ref 42.7–76)
NRBC BLD AUTO-RTO: 0 /100 WBC (ref 0–0.2)
O2 A-A PPRESDIFF RESPIRATORY: 0.1 MMHG
O2 A-A PPRESDIFF RESPIRATORY: 0.3 MMHG
PCO2 BLDA: 36.3 MM HG (ref 35–45)
PCO2 BLDA: 38 MM HG (ref 35–45)
PCO2 BLDA: 38.9 MM HG (ref 35–45)
PCO2 BLDA: 39.8 MM HG (ref 35–45)
PCO2 BLDA: 47.9 MM HG (ref 35–45)
PCO2 BLDA: 52.9 MM HG (ref 35–45)
PCO2 BLDA: 54.5 MM HG (ref 35–45)
PCO2 BLDA: 55 MM HG (ref 35–45)
PEEP RESPIRATORY: 10 CM[H2O]
PEEP RESPIRATORY: 8 CM[H2O]
PH BLDA: 7.29 PH UNITS (ref 7.35–7.6)
PH BLDA: 7.3 PH UNITS (ref 7.35–7.6)
PH BLDA: 7.3 PH UNITS (ref 7.35–7.6)
PH BLDA: 7.33 PH UNITS (ref 7.35–7.6)
PH BLDA: 7.36 PH UNITS (ref 7.35–7.45)
PH BLDA: 7.36 PH UNITS (ref 7.35–7.45)
PH BLDA: 7.38 PH UNITS (ref 7.35–7.6)
PH BLDA: 7.41 PH UNITS (ref 7.35–7.6)
PHOSPHATE SERPL-MCNC: 1.7 MG/DL (ref 2.5–4.5)
PHOSPHATE SERPL-MCNC: 2.8 MG/DL (ref 2.5–4.5)
PLATELET # BLD AUTO: 154 10*3/MM3 (ref 140–450)
PLATELET # BLD AUTO: 174 10*3/MM3 (ref 140–450)
PMV BLD AUTO: 9.7 FL (ref 6–12)
PMV BLD AUTO: 9.9 FL (ref 6–12)
PO2 BLDA: 127 MMHG (ref 80–105)
PO2 BLDA: 266 MMHG (ref 80–105)
PO2 BLDA: 303 MMHG (ref 80–105)
PO2 BLDA: 318 MMHG (ref 80–105)
PO2 BLDA: 320 MMHG (ref 80–105)
PO2 BLDA: 40 MMHG (ref 80–105)
PO2 BLDA: 83.8 MM HG (ref 80–100)
PO2 BLDA: 84.9 MM HG (ref 80–100)
POTASSIUM BLDA-SCNC: 4.2 MMOL/L (ref 3.5–4.9)
POTASSIUM BLDA-SCNC: 4.4 MMOL/L (ref 3.5–4.9)
POTASSIUM BLDA-SCNC: 4.6 MMOL/L (ref 3.5–4.9)
POTASSIUM BLDA-SCNC: 5.1 MMOL/L (ref 3.5–4.9)
POTASSIUM BLDA-SCNC: 5.3 MMOL/L (ref 3.5–4.9)
POTASSIUM BLDA-SCNC: 6.1 MMOL/L (ref 3.5–4.9)
POTASSIUM SERPL-SCNC: 4.1 MMOL/L (ref 3.5–5.2)
POTASSIUM SERPL-SCNC: 4.6 MMOL/L (ref 3.5–5.2)
POTASSIUM SERPL-SCNC: 4.6 MMOL/L (ref 3.5–5.2)
PROTHROMBIN TIME: 16.6 SECONDS (ref 11.7–14.2)
PSV: 8 CMH2O
RBC # BLD AUTO: 4 10*6/MM3 (ref 4.14–5.8)
RBC # BLD AUTO: 4.35 10*6/MM3 (ref 4.14–5.8)
SAO2 % BLDA: 100 % (ref 95–98)
SAO2 % BLDA: 67 % (ref 95–98)
SAO2 % BLDA: 99 % (ref 95–98)
SAO2 % BLDCOA: 95.8 % (ref 92–98.5)
SAO2 % BLDCOA: 96 % (ref 92–98.5)
SET MECH RESP RATE: 16
SET MECH RESP RATE: 16
SODIUM SERPL-SCNC: 140 MMOL/L (ref 136–145)
SODIUM SERPL-SCNC: 142 MMOL/L (ref 136–145)
SODIUM SERPL-SCNC: 142 MMOL/L (ref 136–145)
TOTAL RATE: 16 BREATHS/MINUTE
VENTILATOR MODE: ABNORMAL
VENTILATOR MODE: AC
VT ON VENT VENT: 600 ML
VT ON VENT VENT: 700 ML
WBC NRBC COR # BLD AUTO: 12.2 10*3/MM3 (ref 3.4–10.8)
WBC NRBC COR # BLD AUTO: 9.07 10*3/MM3 (ref 3.4–10.8)

## 2024-03-14 PROCEDURE — 82948 REAGENT STRIP/BLOOD GLUCOSE: CPT

## 2024-03-14 PROCEDURE — C1713 ANCHOR/SCREW BN/BN,TIS/BN: HCPCS

## 2024-03-14 PROCEDURE — 85384 FIBRINOGEN ACTIVITY: CPT | Performed by: NURSE PRACTITIONER

## 2024-03-14 PROCEDURE — 85018 HEMOGLOBIN: CPT

## 2024-03-14 PROCEDURE — C1889 IMPLANT/INSERT DEVICE, NOC: HCPCS

## 2024-03-14 PROCEDURE — 5A1221Z PERFORMANCE OF CARDIAC OUTPUT, CONTINUOUS: ICD-10-PCS

## 2024-03-14 PROCEDURE — 25010000002 SUCCINYLCHOLINE PER 20 MG: Performed by: ANESTHESIOLOGY

## 2024-03-14 PROCEDURE — 94799 UNLISTED PULMONARY SVC/PX: CPT

## 2024-03-14 PROCEDURE — 25010000002 ALBUMIN HUMAN 25% PER 50 ML

## 2024-03-14 PROCEDURE — 25810000003 SODIUM CHLORIDE 0.9 % SOLUTION 250 ML FLEX CONT: Performed by: ANESTHESIOLOGY

## 2024-03-14 PROCEDURE — 85025 COMPLETE CBC W/AUTO DIFF WBC: CPT | Performed by: NURSE PRACTITIONER

## 2024-03-14 PROCEDURE — 82803 BLOOD GASES ANY COMBINATION: CPT

## 2024-03-14 PROCEDURE — 33405 REPLACEMENT AORTIC VALVE OPN: CPT

## 2024-03-14 PROCEDURE — B24BZZ4 ULTRASONOGRAPHY OF HEART WITH AORTA, TRANSESOPHAGEAL: ICD-10-PCS | Performed by: ANESTHESIOLOGY

## 2024-03-14 PROCEDURE — 86900 BLOOD TYPING SEROLOGIC ABO: CPT

## 2024-03-14 PROCEDURE — 33268 EXCL LAA OPN OTH PX ANY METH: CPT | Performed by: PHYSICIAN ASSISTANT

## 2024-03-14 PROCEDURE — P9041 ALBUMIN (HUMAN),5%, 50ML: HCPCS | Performed by: NURSE PRACTITIONER

## 2024-03-14 PROCEDURE — 25010000002 VANCOMYCIN 1 G RECONSTITUTED SOLUTION: Performed by: ANESTHESIOLOGY

## 2024-03-14 PROCEDURE — 93318 ECHO TRANSESOPHAGEAL INTRAOP: CPT | Performed by: ANESTHESIOLOGY

## 2024-03-14 PROCEDURE — 25010000002 MORPHINE PER 10 MG: Performed by: NURSE PRACTITIONER

## 2024-03-14 PROCEDURE — 86901 BLOOD TYPING SEROLOGIC RH(D): CPT

## 2024-03-14 PROCEDURE — 33405 REPLACEMENT AORTIC VALVE OPN: CPT | Performed by: PHYSICIAN ASSISTANT

## 2024-03-14 PROCEDURE — 25010000002 PROPOFOL 10 MG/ML EMULSION: Performed by: ANESTHESIOLOGY

## 2024-03-14 PROCEDURE — 85347 COAGULATION TIME ACTIVATED: CPT

## 2024-03-14 PROCEDURE — 82330 ASSAY OF CALCIUM: CPT | Performed by: NURSE PRACTITIONER

## 2024-03-14 PROCEDURE — 25010000002 CEFAZOLIN 3 G RECONSTITUTED SOLUTION 1 EACH VIAL: Performed by: NURSE PRACTITIONER

## 2024-03-14 PROCEDURE — 85730 THROMBOPLASTIN TIME PARTIAL: CPT | Performed by: NURSE PRACTITIONER

## 2024-03-14 PROCEDURE — C1751 CATH, INF, PER/CENT/MIDLINE: HCPCS | Performed by: ANESTHESIOLOGY

## 2024-03-14 PROCEDURE — 88311 DECALCIFY TISSUE: CPT

## 2024-03-14 PROCEDURE — 25010000002 MIDAZOLAM PER 1 MG: Performed by: ANESTHESIOLOGY

## 2024-03-14 PROCEDURE — 25010000002 MAGNESIUM SULFATE PER 500 MG OF MAGNESIUM: Performed by: ANESTHESIOLOGY

## 2024-03-14 PROCEDURE — 85610 PROTHROMBIN TIME: CPT | Performed by: NURSE PRACTITIONER

## 2024-03-14 PROCEDURE — 82803 BLOOD GASES ANY COMBINATION: CPT | Performed by: NURSE PRACTITIONER

## 2024-03-14 PROCEDURE — 82947 ASSAY GLUCOSE BLOOD QUANT: CPT

## 2024-03-14 PROCEDURE — 63710000001 INSULIN REGULAR HUMAN PER 5 UNITS: Performed by: ANESTHESIOLOGY

## 2024-03-14 PROCEDURE — 25010000002 ACETAMINOPHEN 10 MG/ML SOLUTION: Performed by: NURSE PRACTITIONER

## 2024-03-14 PROCEDURE — 02L70ZK OCCLUSION OF LEFT ATRIAL APPENDAGE, OPEN APPROACH: ICD-10-PCS

## 2024-03-14 PROCEDURE — 25010000002 MAGNESIUM SULFATE IN D5W 1G/100ML (PREMIX) 1-5 GM/100ML-% SOLUTION: Performed by: NURSE PRACTITIONER

## 2024-03-14 PROCEDURE — 02RF08Z REPLACEMENT OF AORTIC VALVE WITH ZOOPLASTIC TISSUE, OPEN APPROACH: ICD-10-PCS

## 2024-03-14 PROCEDURE — 88305 TISSUE EXAM BY PATHOLOGIST: CPT

## 2024-03-14 PROCEDURE — 71045 X-RAY EXAM CHEST 1 VIEW: CPT

## 2024-03-14 PROCEDURE — 85014 HEMATOCRIT: CPT

## 2024-03-14 PROCEDURE — 80048 BASIC METABOLIC PNL TOTAL CA: CPT | Performed by: NURSE PRACTITIONER

## 2024-03-14 PROCEDURE — 25010000002 FENTANYL CITRATE (PF) 50 MCG/ML SOLUTION: Performed by: ANESTHESIOLOGY

## 2024-03-14 PROCEDURE — P9047 ALBUMIN (HUMAN), 25%, 50ML: HCPCS

## 2024-03-14 PROCEDURE — 25010000002 METOCLOPRAMIDE PER 10 MG: Performed by: NURSE PRACTITIONER

## 2024-03-14 PROCEDURE — 25010000002 NICARDIPINE 2.5 MG/ML SOLUTION 10 ML VIAL: Performed by: ANESTHESIOLOGY

## 2024-03-14 PROCEDURE — 25010000002 ALBUMIN HUMAN 5% PER 50 ML: Performed by: NURSE PRACTITIONER

## 2024-03-14 PROCEDURE — 85027 COMPLETE CBC AUTOMATED: CPT | Performed by: NURSE PRACTITIONER

## 2024-03-14 PROCEDURE — 25010000002 HEPARIN (PORCINE) PER 1000 UNITS

## 2024-03-14 PROCEDURE — 25810000003 SODIUM CHLORIDE 0.9 % SOLUTION

## 2024-03-14 PROCEDURE — 33268 EXCL LAA OPN OTH PX ANY METH: CPT

## 2024-03-14 PROCEDURE — 94002 VENT MGMT INPAT INIT DAY: CPT

## 2024-03-14 PROCEDURE — 25010000002 PROTAMINE SULFATE PER 10 MG: Performed by: ANESTHESIOLOGY

## 2024-03-14 PROCEDURE — 25010000002 HEPARIN (PORCINE) PER 1000 UNITS: Performed by: ANESTHESIOLOGY

## 2024-03-14 PROCEDURE — 83735 ASSAY OF MAGNESIUM: CPT | Performed by: NURSE PRACTITIONER

## 2024-03-14 PROCEDURE — 25010000002 VANCOMYCIN 1 G RECONSTITUTED SOLUTION

## 2024-03-14 PROCEDURE — 80069 RENAL FUNCTION PANEL: CPT | Performed by: NURSE PRACTITIONER

## 2024-03-14 DEVICE — APPL CLIP LAA ATRICLIP FLXV 45MM: Type: IMPLANTABLE DEVICE | Site: HEART | Status: FUNCTIONAL

## 2024-03-14 DEVICE — SS SUTURE, 4 PER SLEEVE
Type: IMPLANTABLE DEVICE | Site: CHEST WALL | Status: FUNCTIONAL
Brand: MYO/WIRE II

## 2024-03-14 DEVICE — IMPLANTABLE DEVICE: Type: IMPLANTABLE DEVICE | Site: CHEST WALL | Status: FUNCTIONAL

## 2024-03-14 DEVICE — SS SUTURE, 3 PER SLEEVE
Type: IMPLANTABLE DEVICE | Site: CHEST WALL | Status: FUNCTIONAL
Brand: MYO/WIRE II

## 2024-03-14 DEVICE — TEMP PACING WIRE
Type: IMPLANTABLE DEVICE | Site: HEART | Status: FUNCTIONAL
Brand: MYO/WIRE

## 2024-03-14 DEVICE — COR-KNOT® QUICK LOAD® SINGLES
Type: IMPLANTABLE DEVICE | Site: HEART | Status: FUNCTIONAL
Brand: COR-KNOT® QUICK LOAD®

## 2024-03-14 DEVICE — ABSORBABLE HEMOSTAT (OXIDIZED REGENERATED CELLULOSE)
Type: IMPLANTABLE DEVICE | Site: HEART | Status: FUNCTIONAL
Brand: SURGICEL

## 2024-03-14 DEVICE — COR-KNOT MINI® COMBO KITBASE PACKAGE TYPE - KITEACH STERILE PACKAGE KIT CONTAINS (2) SINGLE PATIENT USE COR-KNOT MINI® DEVICES AND (12) COR-KNOT® QUICK LOADS®.
Type: IMPLANTABLE DEVICE | Site: HEART | Status: FUNCTIONAL
Brand: COR-KNOT MINI®

## 2024-03-14 DEVICE — HEMOST ABS SURGIFOAM SZ100 8X12 10MM: Type: IMPLANTABLE DEVICE | Site: CHEST | Status: FUNCTIONAL

## 2024-03-14 DEVICE — VLV PERICARD PERIMOUNT MAGNA EASE 27: Type: IMPLANTABLE DEVICE | Site: HEART | Status: FUNCTIONAL

## 2024-03-14 RX ORDER — AMOXICILLIN 250 MG
2 CAPSULE ORAL NIGHTLY
Status: DISCONTINUED | OUTPATIENT
Start: 2024-03-15 | End: 2024-03-19 | Stop reason: HOSPADM

## 2024-03-14 RX ORDER — PANTOPRAZOLE SODIUM 40 MG/1
40 TABLET, DELAYED RELEASE ORAL EVERY MORNING
Status: DISCONTINUED | OUTPATIENT
Start: 2024-03-15 | End: 2024-03-19 | Stop reason: HOSPADM

## 2024-03-14 RX ORDER — ACETAMINOPHEN 325 MG/1
650 TABLET ORAL EVERY 4 HOURS
Qty: 8 TABLET | Refills: 0 | Status: COMPLETED | OUTPATIENT
Start: 2024-03-15 | End: 2024-03-16

## 2024-03-14 RX ORDER — FENTANYL CITRATE 50 UG/ML
INJECTION, SOLUTION INTRAMUSCULAR; INTRAVENOUS AS NEEDED
Status: DISCONTINUED | OUTPATIENT
Start: 2024-03-14 | End: 2024-03-14 | Stop reason: SURG

## 2024-03-14 RX ORDER — DEXTROSE MONOHYDRATE 25 G/50ML
10-50 INJECTION, SOLUTION INTRAVENOUS
Status: DISCONTINUED | OUTPATIENT
Start: 2024-03-14 | End: 2024-03-15

## 2024-03-14 RX ORDER — DOPAMINE HYDROCHLORIDE 160 MG/100ML
2-20 INJECTION, SOLUTION INTRAVENOUS CONTINUOUS PRN
Status: DISCONTINUED | OUTPATIENT
Start: 2024-03-14 | End: 2024-03-15

## 2024-03-14 RX ORDER — MAGNESIUM SULFATE 1 G/100ML
1 INJECTION INTRAVENOUS EVERY 8 HOURS
Status: DISPENSED | OUTPATIENT
Start: 2024-03-14 | End: 2024-03-15

## 2024-03-14 RX ORDER — NALOXONE HCL 0.4 MG/ML
0.4 VIAL (ML) INJECTION
Status: DISCONTINUED | OUTPATIENT
Start: 2024-03-14 | End: 2024-03-19 | Stop reason: HOSPADM

## 2024-03-14 RX ORDER — NICOTINE POLACRILEX 4 MG
15 LOZENGE BUCCAL
Status: DISCONTINUED | OUTPATIENT
Start: 2024-03-14 | End: 2024-03-15

## 2024-03-14 RX ORDER — AMINOCAPROIC ACID 250 MG/ML
INJECTION, SOLUTION INTRAVENOUS AS NEEDED
Status: DISCONTINUED | OUTPATIENT
Start: 2024-03-14 | End: 2024-03-14 | Stop reason: SURG

## 2024-03-14 RX ORDER — ALPRAZOLAM 0.25 MG/1
0.25 TABLET ORAL EVERY 8 HOURS PRN
Status: DISCONTINUED | OUTPATIENT
Start: 2024-03-14 | End: 2024-03-19 | Stop reason: HOSPADM

## 2024-03-14 RX ORDER — CHLORHEXIDINE GLUCONATE ORAL RINSE 1.2 MG/ML
15 SOLUTION DENTAL EVERY 12 HOURS
Status: DISCONTINUED | OUTPATIENT
Start: 2024-03-14 | End: 2024-03-19 | Stop reason: HOSPADM

## 2024-03-14 RX ORDER — DEXMEDETOMIDINE HYDROCHLORIDE 4 UG/ML
.2-1.5 INJECTION, SOLUTION INTRAVENOUS
Status: DISCONTINUED | OUTPATIENT
Start: 2024-03-14 | End: 2024-03-15

## 2024-03-14 RX ORDER — OXYCODONE HYDROCHLORIDE 5 MG/1
10 TABLET ORAL EVERY 4 HOURS PRN
Status: DISCONTINUED | OUTPATIENT
Start: 2024-03-14 | End: 2024-03-19 | Stop reason: HOSPADM

## 2024-03-14 RX ORDER — BISACODYL 10 MG
10 SUPPOSITORY, RECTAL RECTAL DAILY PRN
Status: DISCONTINUED | OUTPATIENT
Start: 2024-03-15 | End: 2024-03-19 | Stop reason: HOSPADM

## 2024-03-14 RX ORDER — MIDAZOLAM HYDROCHLORIDE 1 MG/ML
INJECTION INTRAMUSCULAR; INTRAVENOUS AS NEEDED
Status: DISCONTINUED | OUTPATIENT
Start: 2024-03-14 | End: 2024-03-14 | Stop reason: SURG

## 2024-03-14 RX ORDER — CYCLOBENZAPRINE HCL 10 MG
10 TABLET ORAL EVERY 8 HOURS PRN
Status: DISCONTINUED | OUTPATIENT
Start: 2024-03-15 | End: 2024-03-19 | Stop reason: HOSPADM

## 2024-03-14 RX ORDER — BISACODYL 5 MG/1
10 TABLET, DELAYED RELEASE ORAL DAILY PRN
Status: DISCONTINUED | OUTPATIENT
Start: 2024-03-14 | End: 2024-03-19 | Stop reason: HOSPADM

## 2024-03-14 RX ORDER — ONDANSETRON 2 MG/ML
4 INJECTION INTRAMUSCULAR; INTRAVENOUS EVERY 6 HOURS PRN
Status: DISCONTINUED | OUTPATIENT
Start: 2024-03-14 | End: 2024-03-19 | Stop reason: HOSPADM

## 2024-03-14 RX ORDER — ASPIRIN 81 MG/1
81 TABLET ORAL DAILY
Status: DISCONTINUED | OUTPATIENT
Start: 2024-03-15 | End: 2024-03-19 | Stop reason: HOSPADM

## 2024-03-14 RX ORDER — MAGNESIUM SULFATE HEPTAHYDRATE 500 MG/ML
INJECTION, SOLUTION INTRAMUSCULAR; INTRAVENOUS AS NEEDED
Status: DISCONTINUED | OUTPATIENT
Start: 2024-03-14 | End: 2024-03-14 | Stop reason: SURG

## 2024-03-14 RX ORDER — ACETAMINOPHEN 160 MG/5ML
650 SOLUTION ORAL EVERY 4 HOURS
Status: COMPLETED | OUTPATIENT
Start: 2024-03-15 | End: 2024-03-16

## 2024-03-14 RX ORDER — PROPOFOL 10 MG/ML
VIAL (ML) INTRAVENOUS AS NEEDED
Status: DISCONTINUED | OUTPATIENT
Start: 2024-03-14 | End: 2024-03-14 | Stop reason: SDUPTHER

## 2024-03-14 RX ORDER — ACETAMINOPHEN 325 MG/1
650 TABLET ORAL EVERY 4 HOURS PRN
Status: DISCONTINUED | OUTPATIENT
Start: 2024-03-15 | End: 2024-03-19 | Stop reason: HOSPADM

## 2024-03-14 RX ORDER — ALBUMIN, HUMAN INJ 5% 5 %
1500 SOLUTION INTRAVENOUS AS NEEDED
Status: DISPENSED | OUTPATIENT
Start: 2024-03-14 | End: 2024-03-15

## 2024-03-14 RX ORDER — IBUPROFEN 600 MG/1
1 TABLET ORAL
Status: DISCONTINUED | OUTPATIENT
Start: 2024-03-14 | End: 2024-03-15

## 2024-03-14 RX ORDER — SODIUM CHLORIDE 9 MG/ML
INJECTION, SOLUTION INTRAVENOUS CONTINUOUS PRN
Status: DISCONTINUED | OUTPATIENT
Start: 2024-03-14 | End: 2024-03-14 | Stop reason: SURG

## 2024-03-14 RX ORDER — ROCURONIUM BROMIDE 10 MG/ML
INJECTION, SOLUTION INTRAVENOUS AS NEEDED
Status: DISCONTINUED | OUTPATIENT
Start: 2024-03-14 | End: 2024-03-14 | Stop reason: SURG

## 2024-03-14 RX ORDER — MORPHINE SULFATE 2 MG/ML
4 INJECTION, SOLUTION INTRAMUSCULAR; INTRAVENOUS
Status: DISCONTINUED | OUTPATIENT
Start: 2024-03-14 | End: 2024-03-19 | Stop reason: HOSPADM

## 2024-03-14 RX ORDER — KETAMINE HCL IN NACL, ISO-OSM 100MG/10ML
SYRINGE (ML) INJECTION AS NEEDED
Status: DISCONTINUED | OUTPATIENT
Start: 2024-03-14 | End: 2024-03-14 | Stop reason: SURG

## 2024-03-14 RX ORDER — PHENYLEPHRINE HCL IN 0.9% NACL 0.5 MG/5ML
.2-2 SYRINGE (ML) INTRAVENOUS CONTINUOUS PRN
Status: DISCONTINUED | OUTPATIENT
Start: 2024-03-14 | End: 2024-03-15

## 2024-03-14 RX ORDER — NITROGLYCERIN 0.4 MG/1
0.4 TABLET SUBLINGUAL
Status: DISCONTINUED | OUTPATIENT
Start: 2024-03-14 | End: 2024-03-19 | Stop reason: HOSPADM

## 2024-03-14 RX ORDER — VANCOMYCIN HYDROCHLORIDE 1 G/20ML
INJECTION, POWDER, LYOPHILIZED, FOR SOLUTION INTRAVENOUS AS NEEDED
Status: DISCONTINUED | OUTPATIENT
Start: 2024-03-14 | End: 2024-03-14 | Stop reason: SURG

## 2024-03-14 RX ORDER — MILRINONE LACTATE 0.2 MG/ML
.25-.375 INJECTION, SOLUTION INTRAVENOUS CONTINUOUS PRN
Status: DISCONTINUED | OUTPATIENT
Start: 2024-03-14 | End: 2024-03-15

## 2024-03-14 RX ORDER — METOCLOPRAMIDE HYDROCHLORIDE 5 MG/ML
10 INJECTION INTRAMUSCULAR; INTRAVENOUS EVERY 6 HOURS
Status: DISPENSED | OUTPATIENT
Start: 2024-03-14 | End: 2024-03-15

## 2024-03-14 RX ORDER — DEXTROSE MONOHYDRATE 25 G/50ML
INJECTION, SOLUTION INTRAVENOUS AS NEEDED
Status: DISCONTINUED | OUTPATIENT
Start: 2024-03-14 | End: 2024-03-14 | Stop reason: SURG

## 2024-03-14 RX ORDER — NOREPINEPHRINE BITARTRATE 0.03 MG/ML
.02-.3 INJECTION, SOLUTION INTRAVENOUS CONTINUOUS PRN
Status: DISCONTINUED | OUTPATIENT
Start: 2024-03-14 | End: 2024-03-15

## 2024-03-14 RX ORDER — HEPARIN SODIUM 1000 [USP'U]/ML
INJECTION, SOLUTION INTRAVENOUS; SUBCUTANEOUS AS NEEDED
Status: DISCONTINUED | OUTPATIENT
Start: 2024-03-14 | End: 2024-03-14 | Stop reason: SURG

## 2024-03-14 RX ORDER — ENOXAPARIN SODIUM 100 MG/ML
40 INJECTION SUBCUTANEOUS DAILY
Status: COMPLETED | OUTPATIENT
Start: 2024-03-15 | End: 2024-03-16

## 2024-03-14 RX ORDER — MEPERIDINE HYDROCHLORIDE 25 MG/ML
25 INJECTION INTRAMUSCULAR; INTRAVENOUS; SUBCUTANEOUS EVERY 4 HOURS PRN
Status: ACTIVE | OUTPATIENT
Start: 2024-03-14 | End: 2024-03-14

## 2024-03-14 RX ORDER — PANTOPRAZOLE SODIUM 40 MG/10ML
40 INJECTION, POWDER, LYOPHILIZED, FOR SOLUTION INTRAVENOUS ONCE
Status: COMPLETED | OUTPATIENT
Start: 2024-03-14 | End: 2024-03-14

## 2024-03-14 RX ORDER — ATORVASTATIN CALCIUM 20 MG/1
40 TABLET, FILM COATED ORAL NIGHTLY
Status: DISCONTINUED | OUTPATIENT
Start: 2024-03-14 | End: 2024-03-19 | Stop reason: HOSPADM

## 2024-03-14 RX ORDER — SODIUM CHLORIDE 9 MG/ML
30 INJECTION, SOLUTION INTRAVENOUS CONTINUOUS
Status: DISCONTINUED | OUTPATIENT
Start: 2024-03-14 | End: 2024-03-19 | Stop reason: HOSPADM

## 2024-03-14 RX ORDER — PROTAMINE SULFATE 10 MG/ML
INJECTION, SOLUTION INTRAVENOUS AS NEEDED
Status: DISCONTINUED | OUTPATIENT
Start: 2024-03-14 | End: 2024-03-14 | Stop reason: SURG

## 2024-03-14 RX ORDER — ACETAMINOPHEN 650 MG/1
650 SUPPOSITORY RECTAL EVERY 4 HOURS
Qty: 4 SUPPOSITORY | Refills: 0 | Status: COMPLETED | OUTPATIENT
Start: 2024-03-15 | End: 2024-03-16

## 2024-03-14 RX ORDER — ACETAMINOPHEN 160 MG/5ML
650 SOLUTION ORAL EVERY 4 HOURS PRN
Status: DISCONTINUED | OUTPATIENT
Start: 2024-03-15 | End: 2024-03-19 | Stop reason: HOSPADM

## 2024-03-14 RX ORDER — ACETAMINOPHEN 650 MG/1
650 SUPPOSITORY RECTAL EVERY 4 HOURS PRN
Status: DISCONTINUED | OUTPATIENT
Start: 2024-03-15 | End: 2024-03-19 | Stop reason: HOSPADM

## 2024-03-14 RX ORDER — MORPHINE SULFATE 2 MG/ML
1 INJECTION, SOLUTION INTRAMUSCULAR; INTRAVENOUS EVERY 4 HOURS PRN
Status: DISCONTINUED | OUTPATIENT
Start: 2024-03-14 | End: 2024-03-19 | Stop reason: HOSPADM

## 2024-03-14 RX ORDER — MIDAZOLAM HYDROCHLORIDE 1 MG/ML
2 INJECTION INTRAMUSCULAR; INTRAVENOUS
Status: DISCONTINUED | OUTPATIENT
Start: 2024-03-14 | End: 2024-03-15

## 2024-03-14 RX ORDER — HYDROCODONE BITARTRATE AND ACETAMINOPHEN 5; 325 MG/1; MG/1
2 TABLET ORAL EVERY 4 HOURS PRN
Status: DISCONTINUED | OUTPATIENT
Start: 2024-03-14 | End: 2024-03-19 | Stop reason: HOSPADM

## 2024-03-14 RX ORDER — ACETAMINOPHEN 10 MG/ML
1000 INJECTION, SOLUTION INTRAVENOUS EVERY 8 HOURS
Status: COMPLETED | OUTPATIENT
Start: 2024-03-14 | End: 2024-03-15

## 2024-03-14 RX ORDER — SUCCINYLCHOLINE CHLORIDE 20 MG/ML
INJECTION INTRAMUSCULAR; INTRAVENOUS AS NEEDED
Status: DISCONTINUED | OUTPATIENT
Start: 2024-03-14 | End: 2024-03-14 | Stop reason: SURG

## 2024-03-14 RX ORDER — NITROGLYCERIN 20 MG/100ML
5-200 INJECTION INTRAVENOUS
Status: DISCONTINUED | OUTPATIENT
Start: 2024-03-14 | End: 2024-03-15

## 2024-03-14 RX ORDER — DEXMEDETOMIDINE HYDROCHLORIDE 4 UG/ML
INJECTION, SOLUTION INTRAVENOUS CONTINUOUS PRN
Status: DISCONTINUED | OUTPATIENT
Start: 2024-03-14 | End: 2024-03-14 | Stop reason: SDUPTHER

## 2024-03-14 RX ORDER — POLYETHYLENE GLYCOL 3350 17 G/17G
17 POWDER, FOR SOLUTION ORAL DAILY PRN
Status: DISCONTINUED | OUTPATIENT
Start: 2024-03-14 | End: 2024-03-19 | Stop reason: HOSPADM

## 2024-03-14 RX ADMIN — MORPHINE SULFATE 1 MG: 2 INJECTION, SOLUTION INTRAMUSCULAR; INTRAVENOUS at 16:02

## 2024-03-14 RX ADMIN — LEVOTHYROXINE SODIUM 75 MCG: 75 TABLET ORAL at 06:24

## 2024-03-14 RX ADMIN — PROPOFOL 150 MG: 10 INJECTION, EMULSION INTRAVENOUS at 06:58

## 2024-03-14 RX ADMIN — SODIUM CHLORIDE 3 G: 900 INJECTION INTRAVENOUS at 06:40

## 2024-03-14 RX ADMIN — Medication 50 MG: at 09:53

## 2024-03-14 RX ADMIN — OXYCODONE 10 MG: 5 TABLET ORAL at 22:51

## 2024-03-14 RX ADMIN — NICARDIPINE HYDROCHLORIDE 7.5 MG/HR: 25 INJECTION, SOLUTION INTRAVENOUS at 10:01

## 2024-03-14 RX ADMIN — FENTANYL CITRATE 100 MCG: 0.05 INJECTION, SOLUTION INTRAMUSCULAR; INTRAVENOUS at 10:00

## 2024-03-14 RX ADMIN — ROCURONIUM BROMIDE 50 MG: 10 INJECTION, SOLUTION INTRAVENOUS at 08:18

## 2024-03-14 RX ADMIN — 0.12% CHLORHEXIDINE GLUCONATE 15 ML: 1.2 RINSE ORAL at 23:51

## 2024-03-14 RX ADMIN — CYCLOBENZAPRINE 10 MG: 10 TABLET, FILM COATED ORAL at 23:51

## 2024-03-14 RX ADMIN — PROTAMINE SULFATE 300 MG: 10 INJECTION, SOLUTION INTRAVENOUS at 09:37

## 2024-03-14 RX ADMIN — ROCURONIUM BROMIDE 50 MG: 10 INJECTION, SOLUTION INTRAVENOUS at 07:04

## 2024-03-14 RX ADMIN — SODIUM CHLORIDE: 9 INJECTION, SOLUTION INTRAVENOUS at 06:30

## 2024-03-14 RX ADMIN — VANCOMYCIN HYDROCHLORIDE 1 G: 1 INJECTION, POWDER, LYOPHILIZED, FOR SOLUTION INTRAVENOUS at 06:40

## 2024-03-14 RX ADMIN — DEXMEDETOMIDINE HYDROCHLORIDE IN SODIUM CHLORIDE 0.5 MCG/KG/HR: 4 INJECTION INTRAVENOUS at 07:35

## 2024-03-14 RX ADMIN — ALBUMIN (HUMAN) 250 ML: 12.5 INJECTION, SOLUTION INTRAVENOUS at 18:17

## 2024-03-14 RX ADMIN — METOCLOPRAMIDE 10 MG: 5 INJECTION, SOLUTION INTRAMUSCULAR; INTRAVENOUS at 23:51

## 2024-03-14 RX ADMIN — ACETAMINOPHEN 1000 MG: 1000 INJECTION INTRAVENOUS at 19:51

## 2024-03-14 RX ADMIN — SUCCINYLCHOLINE CHLORIDE 200 MG: 20 INJECTION, SOLUTION INTRAMUSCULAR; INTRAVENOUS; PARENTERAL at 06:58

## 2024-03-14 RX ADMIN — DEXMEDETOMIDINE HYDROCHLORIDE IN SODIUM CHLORIDE 0.5 MCG/KG/HR: 4 INJECTION INTRAVENOUS at 12:29

## 2024-03-14 RX ADMIN — PANTOPRAZOLE SODIUM 40 MG: 40 INJECTION, POWDER, FOR SOLUTION INTRAVENOUS at 12:29

## 2024-03-14 RX ADMIN — MUPIROCIN 1 APPLICATION: 20 OINTMENT TOPICAL at 21:10

## 2024-03-14 RX ADMIN — MAGNESIUM SULFATE HEPTAHYDRATE 2 G: 500 INJECTION, SOLUTION INTRAMUSCULAR; INTRAVENOUS at 07:55

## 2024-03-14 RX ADMIN — FENTANYL CITRATE 50 MCG: 0.05 INJECTION, SOLUTION INTRAMUSCULAR; INTRAVENOUS at 08:09

## 2024-03-14 RX ADMIN — AMINOCAPROIC ACID 10 G: 250 INJECTION, SOLUTION INTRAVENOUS at 09:59

## 2024-03-14 RX ADMIN — METOCLOPRAMIDE 10 MG: 5 INJECTION, SOLUTION INTRAMUSCULAR; INTRAVENOUS at 12:23

## 2024-03-14 RX ADMIN — FENTANYL CITRATE 100 MCG: 0.05 INJECTION, SOLUTION INTRAMUSCULAR; INTRAVENOUS at 07:57

## 2024-03-14 RX ADMIN — MAGNESIUM SULFATE HEPTAHYDRATE 2 G: 500 INJECTION, SOLUTION INTRAMUSCULAR; INTRAVENOUS at 09:59

## 2024-03-14 RX ADMIN — CHLORHEXIDINE GLUCONATE 1 APPLICATION: 500 CLOTH TOPICAL at 06:24

## 2024-03-14 RX ADMIN — ALBUMIN (HUMAN) 250 ML: 12.5 INJECTION, SOLUTION INTRAVENOUS at 14:34

## 2024-03-14 RX ADMIN — Medication 50 MG: at 07:40

## 2024-03-14 RX ADMIN — MIDAZOLAM 2 MG: 1 INJECTION INTRAMUSCULAR; INTRAVENOUS at 06:40

## 2024-03-14 RX ADMIN — SODIUM CHLORIDE 3 G: 900 INJECTION INTRAVENOUS at 21:39

## 2024-03-14 RX ADMIN — SODIUM CHLORIDE 3 G: 900 INJECTION INTRAVENOUS at 14:30

## 2024-03-14 RX ADMIN — INSULIN HUMAN 10 UNITS: 100 INJECTION, SOLUTION PARENTERAL at 08:43

## 2024-03-14 RX ADMIN — MAGNESIUM SULFATE IN DEXTROSE 1 G: 10 INJECTION, SOLUTION INTRAVENOUS at 19:52

## 2024-03-14 RX ADMIN — NICARDIPINE HYDROCHLORIDE 2.5 MG/HR: 25 INJECTION, SOLUTION INTRAVENOUS at 09:35

## 2024-03-14 RX ADMIN — FENTANYL CITRATE 200 MCG: 0.05 INJECTION, SOLUTION INTRAMUSCULAR; INTRAVENOUS at 06:58

## 2024-03-14 RX ADMIN — ATORVASTATIN CALCIUM 40 MG: 20 TABLET, FILM COATED ORAL at 21:08

## 2024-03-14 RX ADMIN — OXYCODONE 10 MG: 5 TABLET ORAL at 17:52

## 2024-03-14 RX ADMIN — DEXTROSE MONOHYDRATE 5 G: 25 INJECTION, SOLUTION INTRAVENOUS at 08:43

## 2024-03-14 RX ADMIN — MUPIROCIN 1 APPLICATION: 20 OINTMENT TOPICAL at 12:30

## 2024-03-14 RX ADMIN — 0.12% CHLORHEXIDINE GLUCONATE 15 ML: 1.2 RINSE ORAL at 14:30

## 2024-03-14 RX ADMIN — ACETAMINOPHEN 1000 MG: 1000 INJECTION INTRAVENOUS at 12:29

## 2024-03-14 RX ADMIN — HEPARIN SODIUM 35000 UNITS: 1000 INJECTION, SOLUTION INTRAVENOUS; SUBCUTANEOUS at 08:10

## 2024-03-14 RX ADMIN — AMINOCAPROIC ACID 10 G: 250 INJECTION, SOLUTION INTRAVENOUS at 08:15

## 2024-03-14 RX ADMIN — PROPOFOL 25 MCG/KG/MIN: 10 INJECTION, EMULSION INTRAVENOUS at 07:30

## 2024-03-14 RX ADMIN — SODIUM CHLORIDE 1.6 UNITS/HR: 9 INJECTION, SOLUTION INTRAVENOUS at 08:43

## 2024-03-14 NOTE — PLAN OF CARE
Goal Outcome Evaluation:  Plan of Care Reviewed With: patient           Outcome Evaluation: A&O x4, on room air. up ad arron. tele SR with 1st degree heart block. plan for AVR with Dr. West this morning. spouse at bedside through the night.

## 2024-03-14 NOTE — ANESTHESIA PROCEDURE NOTES
Arterial Line      Patient reassessed immediately prior to procedure    Patient location during procedure: OR   Line placed for hemodynamic monitoring and ABGs/Labs/ISTAT.  Performed By   Anesthesiologist: Adolfo De MD   Preanesthetic Checklist  Completed: patient identified, IV checked, site marked, risks and benefits discussed, surgical consent, monitors and equipment checked, pre-op evaluation and timeout performed  Arterial Line Prep    Sterile Tech: cap, gloves, sterile barriers and mask  Prep: ChloraPrep  Patient monitoring: EKG, continuous pulse oximetry and blood pressure monitoring  Arterial Line Procedure   Laterality:left  Location:  radial artery  Catheter size: 20 G   Guidance: palpation technique  Number of attempts: 1  Successful placement: yes Images: still images not obtained  Post Assessment   Dressing Type: wrist guard applied, secured with tape and occlusive dressing applied.   Complications no  Circ/Move/Sens Assessment: normal and unchanged.   Patient Tolerance: patient tolerated the procedure well with no apparent complications

## 2024-03-14 NOTE — ANESTHESIA PREPROCEDURE EVALUATION
Anesthesia Evaluation     Patient summary reviewed   NPO Solid Status: > 8 hours  NPO Liquid Status: > 2 hours           Airway   Mallampati: II  TM distance: >3 FB  Neck ROM: full  Possible difficult intubation  Dental - normal exam     Pulmonary    Cardiovascular   Exercise tolerance: poor (<4 METS)    ECG reviewed    (+) hypertension, valvular problems/murmurs AS, angina, murmur, hyperlipidemia      Neuro/Psych  GI/Hepatic/Renal/Endo    (+) morbid obesity, diabetes mellitus type 2    Musculoskeletal     Abdominal   (+) obese   Substance History      OB/GYN          Other                    Anesthesia Plan    ASA 4     general, Krystina, CVL and PAC     intravenous induction   Postoperative Plan: Expected vent after surgery  Anesthetic plan, risks, benefits, and alternatives have been provided, discussed and informed consent has been obtained with: patient.  Pre-procedure education provided  Use of blood products discussed with patient .      CODE STATUS:    Code Status (Patient has no pulse and is not breathing): CPR (Attempt to Resuscitate)  Medical Interventions (Patient has pulse or is breathing): Full Support

## 2024-03-14 NOTE — ANESTHESIA POSTPROCEDURE EVALUATION
Patient: Hayden Michaud    Procedure Summary       Date: 03/14/24 Room / Location: [place] [address] / [facility] CARDIOVASCULAR OPERATING ROOM    Anesthesia Start: 0630 Anesthesia Stop: 1118    Procedure: INTRA OP MARTIN, STERNOTOMY, AORTIC VALVE REPLACEMENT, EXCLUSION OF LEFT ATRIAL APPENDAGE, PRP (Chest) Diagnosis:       Aortic valve stenosis, etiology of cardiac valve disease unspecified      (Aortic valve stenosis, etiology of cardiac valve disease unspecified [I35.0])    Surgeons: Solomon West MD Provider: Adolfo De MD    Anesthesia Type: general, [place], CVL, PAC ASA Status: 4            Anesthesia Type: general, [place], CVL, PAC    Vitals  Vitals Value Taken Time   /76 03/14/24 1530   Temp 36.9 °C (98.42 °F) 03/14/24 1541   Pulse 80 03/14/24 1541   Resp 16 03/14/24 1112   SpO2 98 % 03/14/24 1541   Vitals shown include unfiled device data.        Post Anesthesia Care and Evaluation    Patient location during evaluation: ICU  Patient participation: complete - patient cannot participate  Level of consciousness: responsive to physical stimuli  Pain management: adequate    Airway patency: patent  Anesthetic complications: No anesthetic complications  PONV Status: none  Cardiovascular status: acceptable and hemodynamically stable  Respiratory status: acceptable, ETT, intubated and ventilator  Hydration status: acceptable    
EMT/paramedic

## 2024-03-14 NOTE — DISCHARGE PLACEMENT REQUEST
"MelvinavincentClaireOctavio STEPHANIE (58 y.o. Male)       Date of Birth   1965    Social Security Number       Address   [address] [place] [address]    Home Phone   [phone]    MRN   [id number]       Jew   Patient Refused    Marital Status                               Admission Date   3/11/24    Admission Type   Urgent    Admitting Provider   Wally Alvarez MD    Attending Provider   Wally Alvarez MD    Department, Room/Bed   [facility] CARDIOVASCULAR OPERATING ROOM, [place] [facility]       Discharge Date       Discharge Disposition       Discharge Destination                                 Attending Provider: Wally Alvarez MD    Allergies: No Known Allergies    Isolation: None   Infection: None   Code Status: CPR    Ht: 172.7 cm (68\")   Wt: 131 kg (289 lb 6.4 oz)    Admission Cmt: None   Principal Problem: Unstable angina [I20.0]                   Active Insurance as of 3/11/2024       Primary Coverage       Payor Plan Insurance Group Employer/Plan Group    HUMANA HUMANA [id number]       Payor Plan Address Payor Plan Phone Number Payor Plan Fax Number Effective Dates    [address] [address] [phone]  7/1/2019 - None Entered    [facility] [id number]         Subscriber Name Subscriber Birth Date Member ID       OCTAVIO ORNELAS 1965 [id number]                     Emergency Contacts        (Rel.) Home Phone Work Phone Mobile Phone    MARY ORNELAS (Spouse) [phone] -- [phone]                "

## 2024-03-14 NOTE — ANESTHESIA PROCEDURE NOTES
[place] Christopher catheter      Patient reassessed immediately prior to procedure    Patient location during procedure: OR  Indications: central pressure monitoring and vascular access  Staff  Anesthesiologist: Adolfo De MD  Preanesthetic Checklist  Completed: patient identified, IV checked, site marked, risks and benefits discussed, surgical consent, monitors and equipment checked, pre-op evaluation and timeout performed  Central Line Prep  Sterile Tech:gloves, cap, gown, mask and sterile barriers  Prep: chloraprep  no medical exclusion documented for following all elements of maximal sterile barrier technique  Patient monitoring: blood pressure monitoring, continuous pulse oximetry and EKG  Central Line Procedure  Laterality:right  Location:internal jugular  Catheter Type:MAC and [place]-Christopher  Catheter Size:7.5 Fr  Guidance:ultrasound guided  PROCEDURE NOTE/ULTRASOUND INTERPRETATION.  Using ultrasound guidance the potential vascular sites for insertion of the catheter were visualized to determine the patency of the vessel to be used for vascular access.  After selecting the appropriate site for insertion, the needle was visualized under ultrasound being inserted into the internal jugular vein, followed by ultrasound confirmation of wire and catheter placement. There were no abnormalities seen on ultrasound; an image was taken; and the patient tolerated the procedure with no complications. Images: still images obtained, printed/placed on chart  Assessment  Post procedure:biopatch applied, line sutured and occlusive dressing applied  Assessement:blood return through all ports and free fluid flow  Complications:no  Patient Tolerance:patient tolerated the procedure well with no apparent complications

## 2024-03-14 NOTE — ANESTHESIA PROCEDURE NOTES
Airway  Urgency: elective    Date/Time: 3/14/2024 7:02 AM  Airway not difficult    General Information and Staff    Patient location during procedure: OR  Anesthesiologist: Adolfo De MD    Indications and Patient Condition  Indications for airway management: airway protection    Preoxygenated: yes  MILS maintained throughout  Mask difficulty assessment: 2 - vent by mask + OA or adjuvant +/- NMBA    Final Airway Details  Final airway type: endotracheal airway      Successful airway: Microcuff Subglottic Suctioning ETT  Cuffed: yes   Successful intubation technique: direct laryngoscopy  Facilitating devices/methods: anterior pressure/BURP  Endotracheal tube insertion site: oral  Blade: [place]  Blade size: 4  Cormack-Lehane Classification: grade IIa - partial view of glottis  Placement verified by: chest auscultation and capnometry   Measured from: lips  ETT/EBT  to lips (cm): 24  Number of attempts at approach: 1  Assessment: lips, teeth, and gum same as pre-op and atraumatic intubation

## 2024-03-14 NOTE — PLAN OF CARE
Goal Outcome Evaluation:  Plan of Care Reviewed With: patient           Outcome Evaluation: Pt arrived to unit post-op at 1120. Family was brought back to see patient and was educated on plan of care. Pt was extubated at 1555 and is oxygenating well on 4L NC. SBP mantained below 110 per provider's orders. Pt is AxOx4. Pain managed well with PRN meds. Pacer set DDD 70, 10, 0.5 with asystole underneath. EP visited regarding permanent pacemaker.

## 2024-03-14 NOTE — ANESTHESIA PROCEDURE NOTES
Central Line      Patient reassessed immediately prior to procedure    Patient location during procedure: OR  Indications: central pressure monitoring and vascular access  Staff  Anesthesiologist: Adolfo De MD  Preanesthetic Checklist  Completed: patient identified, IV checked, site marked, risks and benefits discussed, surgical consent, monitors and equipment checked, pre-op evaluation and timeout performed  Central Line Prep  Sterile Tech:gloves, cap, gown, mask and sterile barriers  Prep: chloraprep  no medical exclusion documented for following all elements of maximal sterile barrier technique  Patient monitoring: blood pressure monitoring, continuous pulse oximetry and EKG  Central Line Procedure  Laterality:right  Location:internal jugular  Catheter Type:MAC  Catheter Size:9 Fr  Guidance:ultrasound guided  PROCEDURE NOTE/ULTRASOUND INTERPRETATION.  Using ultrasound guidance the potential vascular sites for insertion of the catheter were visualized to determine the patency of the vessel to be used for vascular access.  After selecting the appropriate site for insertion, the needle was visualized under ultrasound being inserted into the internal jugular vein, followed by ultrasound confirmation of wire and catheter placement. There were no abnormalities seen on ultrasound; an image was taken; and the patient tolerated the procedure with no complications. Images: still images obtained, printed/placed on chart  Assessment  Post procedure:biopatch applied, line sutured and occlusive dressing applied  Assessement:blood return through all ports, free fluid flow and chest x-ray ordered  Complications:no  Patient Tolerance:patient tolerated the procedure well with no apparent complications

## 2024-03-14 NOTE — ANESTHESIA PROCEDURE NOTES
Diagnostic IntraOp Alberto    Procedure Performed: Diagnostic IntraOp Alberto       Start Time:        End Time:      Preanesthesia Checklist:  Patient identified, IV assessed, risks and benefits discussed, monitors and equipment assessed, procedure being performed at surgeon's request and anesthesia consent obtained.    General Procedure Information  Diagnostic Indications for Echo:  assessment of ascending aorta and assessment of surgical repair  Location performed:  OR  Intubated  Bite block placed  Heart visualized  Probe Insertion:  Easy  Probe Type:  Multiplane  Modalities:  Color flow mapping, continuous wave Doppler and pulse wave Doppler    Echocardiographic and Doppler Measurements    Ventricles    Right Ventricle:  Cavity size normal.  Hypertrophy not present.  Thrombus not present.  Global function normal.    Left Ventricle:  Cavity size normal.  Thrombus not present.  Global Function normal.  Ejection Fraction 60%.          Valves    Aortic Valve:  Annulus calcified.  Stenosis severe.  Regurgitation mild.  Leaflets normal, calcified and thickened.  Leaflet motions restricted.      Mitral Valve:  Annulus calcified.  Stenosis mild.  Regurgitation trace.  Leaflets calcified and thickened.  Leaflet motions normal and restricted.      Tricuspid Valve:  Annulus normal.  Stenosis not present.  Regurgitation trace.  Leaflet motions normal.    Pulmonic Valve:  Annulus normal.  Stenosis not present.  Regurgitation trace.    Other Valve Findings:       Significant MAC involving posterior annulus extending into P3. P3 is restricted in motion. Mean gradient is 2 mmHg and PHT is 100msec  and area by PHT is 2.2 cm2.  @ HR of 60/min    AV is tricuspid. ISABEL by planimetry is 0.9 cm2 and gradients do not capture the actual severity of stenosis. DI is 0.3.     Aorta    Ascending Aorta:  Size normal.  Dissection not present.  Plaque thickness less than 3 mm.  Mobile plaque not present.    Aortic Arch:  Size normal.  Dissection not  present.  Plaque thickness less than 3 mm.  Mobile plaque not present.    Descending Aorta:  Size normal.  Dissection not present.  Plaque thickness less than 3 mm.  Mobile plaque not present.        Atria    Right Atrium:  Size dilated.  Spontaneous echo contrast not present.  Thrombus not present.  Tumor not present.  Device not present.      Left Atrium:  Size dilated.  Spontaneous echo contrast not present.  Thrombus not present.  Tumor not present.  Device not present.    Left atrial appendage normal.      Septa        Ventricular Septum:  Intra-ventricular septum morphology normal.          Other Findings  Pericardium:  normal  Pleural Effusion:  none  Pulmonary Arteries:  normal      Anesthesia Information  Performed Personally  Anesthesiologist:  Adolfo De MD      Echocardiogram Comments:       Post CPB:  LV EF is 60%. RV systolic function is normal.    27 mm bio-prosthetic valve is well seated in aortic position. Leaflet excursion is normal. There are no leaks.  Peak velocity is 2.62 m/sec and mean gradient is 14 mmHg.    MR is trace.   Rest of valvular function is similar to baseline.    Aorta is intact.

## 2024-03-15 ENCOUNTER — APPOINTMENT (OUTPATIENT)
Dept: GENERAL RADIOLOGY | Facility: HOSPITAL | Age: 59
DRG: 217 | End: 2024-03-15
Payer: COMMERCIAL

## 2024-03-15 LAB
ALBUMIN SERPL-MCNC: 3.8 G/DL (ref 3.5–5.2)
ANION GAP SERPL CALCULATED.3IONS-SCNC: 12.2 MMOL/L (ref 5–15)
BASOPHILS # BLD AUTO: 0.01 10*3/MM3 (ref 0–0.2)
BASOPHILS NFR BLD AUTO: 0.1 % (ref 0–1.5)
BUN SERPL-MCNC: 18 MG/DL (ref 6–20)
BUN/CREAT SERPL: 14.9 (ref 7–25)
CA-I BLD-MCNC: 4.3 MG/DL (ref 4.6–5.4)
CA-I SERPL ISE-MCNC: 1.07 MMOL/L (ref 1.15–1.35)
CALCIUM SPEC-SCNC: 7.6 MG/DL (ref 8.6–10.5)
CHLORIDE SERPL-SCNC: 112 MMOL/L (ref 98–107)
CO2 SERPL-SCNC: 19.8 MMOL/L (ref 22–29)
CREAT SERPL-MCNC: 1.21 MG/DL (ref 0.76–1.27)
DEPRECATED RDW RBC AUTO: 40.3 FL (ref 37–54)
EGFRCR SERPLBLD CKD-EPI 2021: 69.8 ML/MIN/1.73
EOSINOPHIL # BLD AUTO: 0.01 10*3/MM3 (ref 0–0.4)
EOSINOPHIL NFR BLD AUTO: 0.1 % (ref 0.3–6.2)
ERYTHROCYTE [DISTWIDTH] IN BLOOD BY AUTOMATED COUNT: 12.7 % (ref 12.3–15.4)
GLUCOSE BLDC GLUCOMTR-MCNC: 137 MG/DL (ref 70–130)
GLUCOSE BLDC GLUCOMTR-MCNC: 139 MG/DL (ref 70–130)
GLUCOSE BLDC GLUCOMTR-MCNC: 142 MG/DL (ref 70–130)
GLUCOSE BLDC GLUCOMTR-MCNC: 150 MG/DL (ref 70–130)
GLUCOSE BLDC GLUCOMTR-MCNC: 157 MG/DL (ref 70–130)
GLUCOSE BLDC GLUCOMTR-MCNC: 163 MG/DL (ref 70–130)
GLUCOSE BLDC GLUCOMTR-MCNC: 165 MG/DL (ref 70–130)
GLUCOSE BLDC GLUCOMTR-MCNC: 172 MG/DL (ref 70–130)
GLUCOSE BLDC GLUCOMTR-MCNC: 179 MG/DL (ref 70–130)
GLUCOSE SERPL-MCNC: 132 MG/DL (ref 65–99)
HCT VFR BLD AUTO: 30.7 % (ref 37.5–51)
HGB BLD-MCNC: 10.2 G/DL (ref 13–17.7)
IMM GRANULOCYTES # BLD AUTO: 0.05 10*3/MM3 (ref 0–0.05)
IMM GRANULOCYTES NFR BLD AUTO: 0.5 % (ref 0–0.5)
INR PPP: 1.34 (ref 0.9–1.1)
LYMPHOCYTES # BLD AUTO: 0.6 10*3/MM3 (ref 0.7–3.1)
LYMPHOCYTES NFR BLD AUTO: 6 % (ref 19.6–45.3)
MAGNESIUM SERPL-MCNC: 2.4 MG/DL (ref 1.6–2.6)
MCH RBC QN AUTO: 29.1 PG (ref 26.6–33)
MCHC RBC AUTO-ENTMCNC: 33.2 G/DL (ref 31.5–35.7)
MCV RBC AUTO: 87.5 FL (ref 79–97)
MONOCYTES # BLD AUTO: 0.86 10*3/MM3 (ref 0.1–0.9)
MONOCYTES NFR BLD AUTO: 8.5 % (ref 5–12)
NEUTROPHILS NFR BLD AUTO: 8.54 10*3/MM3 (ref 1.7–7)
NEUTROPHILS NFR BLD AUTO: 84.8 % (ref 42.7–76)
NRBC BLD AUTO-RTO: 0 /100 WBC (ref 0–0.2)
PHOSPHATE SERPL-MCNC: 4.3 MG/DL (ref 2.5–4.5)
PLATELET # BLD AUTO: 154 10*3/MM3 (ref 140–450)
PMV BLD AUTO: 10.7 FL (ref 6–12)
POTASSIUM SERPL-SCNC: 4.2 MMOL/L (ref 3.5–5.2)
PROTHROMBIN TIME: 16.8 SECONDS (ref 11.7–14.2)
QT INTERVAL: 420 MS
QT INTERVAL: 482 MS
QTC INTERVAL: 357 MS
QTC INTERVAL: 466 MS
RBC # BLD AUTO: 3.51 10*6/MM3 (ref 4.14–5.8)
SODIUM SERPL-SCNC: 144 MMOL/L (ref 136–145)
WBC NRBC COR # BLD AUTO: 10.07 10*3/MM3 (ref 3.4–10.8)

## 2024-03-15 PROCEDURE — 93005 ELECTROCARDIOGRAM TRACING: CPT

## 2024-03-15 PROCEDURE — 25010000002 METOCLOPRAMIDE PER 10 MG: Performed by: NURSE PRACTITIONER

## 2024-03-15 PROCEDURE — 83735 ASSAY OF MAGNESIUM: CPT | Performed by: NURSE PRACTITIONER

## 2024-03-15 PROCEDURE — 97162 PT EVAL MOD COMPLEX 30 MIN: CPT

## 2024-03-15 PROCEDURE — 85610 PROTHROMBIN TIME: CPT | Performed by: NURSE PRACTITIONER

## 2024-03-15 PROCEDURE — 25010000002 CALCIUM GLUCONATE-NACL 1-0.675 GM/50ML-% SOLUTION

## 2024-03-15 PROCEDURE — 25010000002 ACETAMINOPHEN 10 MG/ML SOLUTION: Performed by: NURSE PRACTITIONER

## 2024-03-15 PROCEDURE — 93010 ELECTROCARDIOGRAM REPORT: CPT | Performed by: INTERNAL MEDICINE

## 2024-03-15 PROCEDURE — 25010000002 CEFAZOLIN 3 G RECONSTITUTED SOLUTION 1 EACH VIAL: Performed by: NURSE PRACTITIONER

## 2024-03-15 PROCEDURE — 71045 X-RAY EXAM CHEST 1 VIEW: CPT

## 2024-03-15 PROCEDURE — 82330 ASSAY OF CALCIUM: CPT | Performed by: NURSE PRACTITIONER

## 2024-03-15 PROCEDURE — 99232 SBSQ HOSP IP/OBS MODERATE 35: CPT | Performed by: PHYSICIAN ASSISTANT

## 2024-03-15 PROCEDURE — 99232 SBSQ HOSP IP/OBS MODERATE 35: CPT | Performed by: INTERNAL MEDICINE

## 2024-03-15 PROCEDURE — 25010000002 MAGNESIUM SULFATE IN D5W 1G/100ML (PREMIX) 1-5 GM/100ML-% SOLUTION: Performed by: NURSE PRACTITIONER

## 2024-03-15 PROCEDURE — 80069 RENAL FUNCTION PANEL: CPT | Performed by: NURSE PRACTITIONER

## 2024-03-15 PROCEDURE — 63710000001 INSULIN LISPRO (HUMAN) PER 5 UNITS

## 2024-03-15 PROCEDURE — 25010000002 ENOXAPARIN PER 10 MG: Performed by: PHYSICIAN ASSISTANT

## 2024-03-15 PROCEDURE — 25010000002 FUROSEMIDE PER 20 MG

## 2024-03-15 PROCEDURE — 93005 ELECTROCARDIOGRAM TRACING: CPT | Performed by: NURSE PRACTITIONER

## 2024-03-15 PROCEDURE — 82948 REAGENT STRIP/BLOOD GLUCOSE: CPT

## 2024-03-15 PROCEDURE — 97530 THERAPEUTIC ACTIVITIES: CPT

## 2024-03-15 PROCEDURE — 85025 COMPLETE CBC W/AUTO DIFF WBC: CPT | Performed by: NURSE PRACTITIONER

## 2024-03-15 RX ORDER — CALCIUM GLUCONATE 20 MG/ML
1000 INJECTION, SOLUTION INTRAVENOUS ONCE
Status: COMPLETED | OUTPATIENT
Start: 2024-03-15 | End: 2024-03-15

## 2024-03-15 RX ORDER — POTASSIUM CHLORIDE 750 MG/1
10 TABLET, FILM COATED, EXTENDED RELEASE ORAL ONCE
Status: COMPLETED | OUTPATIENT
Start: 2024-03-15 | End: 2024-03-15

## 2024-03-15 RX ORDER — GUAIFENESIN 600 MG/1
1200 TABLET, EXTENDED RELEASE ORAL EVERY 12 HOURS SCHEDULED
Status: DISCONTINUED | OUTPATIENT
Start: 2024-03-15 | End: 2024-03-19 | Stop reason: HOSPADM

## 2024-03-15 RX ORDER — INSULIN LISPRO 100 [IU]/ML
2-9 INJECTION, SOLUTION INTRAVENOUS; SUBCUTANEOUS
Status: DISCONTINUED | OUTPATIENT
Start: 2024-03-15 | End: 2024-03-19 | Stop reason: HOSPADM

## 2024-03-15 RX ORDER — LEVOTHYROXINE SODIUM 0.07 MG/1
75 TABLET ORAL
Status: DISCONTINUED | OUTPATIENT
Start: 2024-03-16 | End: 2024-03-19 | Stop reason: HOSPADM

## 2024-03-15 RX ORDER — CETIRIZINE HYDROCHLORIDE 10 MG/1
10 TABLET ORAL DAILY
Status: DISCONTINUED | OUTPATIENT
Start: 2024-03-15 | End: 2024-03-19 | Stop reason: HOSPADM

## 2024-03-15 RX ORDER — BUSPIRONE HYDROCHLORIDE 5 MG/1
5 TABLET ORAL 2 TIMES DAILY PRN
Status: DISCONTINUED | OUTPATIENT
Start: 2024-03-15 | End: 2024-03-19 | Stop reason: HOSPADM

## 2024-03-15 RX ORDER — DEXTROSE MONOHYDRATE 25 G/50ML
25 INJECTION, SOLUTION INTRAVENOUS
Status: DISCONTINUED | OUTPATIENT
Start: 2024-03-15 | End: 2024-03-19 | Stop reason: HOSPADM

## 2024-03-15 RX ORDER — IBUPROFEN 600 MG/1
1 TABLET ORAL
Status: DISCONTINUED | OUTPATIENT
Start: 2024-03-15 | End: 2024-03-19 | Stop reason: HOSPADM

## 2024-03-15 RX ORDER — FUROSEMIDE 10 MG/ML
20 INJECTION INTRAMUSCULAR; INTRAVENOUS ONCE
Status: COMPLETED | OUTPATIENT
Start: 2024-03-15 | End: 2024-03-15

## 2024-03-15 RX ORDER — NICOTINE POLACRILEX 4 MG
15 LOZENGE BUCCAL
Status: DISCONTINUED | OUTPATIENT
Start: 2024-03-15 | End: 2024-03-19 | Stop reason: HOSPADM

## 2024-03-15 RX ORDER — CALCIUM GLUCONATE 94 MG/ML
1 INJECTION, SOLUTION INTRAVENOUS ONCE
Status: DISCONTINUED | OUTPATIENT
Start: 2024-03-15 | End: 2024-03-15

## 2024-03-15 RX ADMIN — CYCLOBENZAPRINE 10 MG: 10 TABLET, FILM COATED ORAL at 11:33

## 2024-03-15 RX ADMIN — MAGNESIUM SULFATE IN DEXTROSE 1 G: 10 INJECTION, SOLUTION INTRAVENOUS at 03:53

## 2024-03-15 RX ADMIN — MUPIROCIN: 20 OINTMENT TOPICAL at 20:12

## 2024-03-15 RX ADMIN — INSULIN LISPRO 2 UNITS: 100 INJECTION, SOLUTION INTRAVENOUS; SUBCUTANEOUS at 11:33

## 2024-03-15 RX ADMIN — CALCIUM GLUCONATE 1000 MG: 20 INJECTION, SOLUTION INTRAVENOUS at 08:57

## 2024-03-15 RX ADMIN — OXYCODONE 10 MG: 5 TABLET ORAL at 03:52

## 2024-03-15 RX ADMIN — OXYCODONE 10 MG: 5 TABLET ORAL at 20:01

## 2024-03-15 RX ADMIN — OXYCODONE 10 MG: 5 TABLET ORAL at 14:59

## 2024-03-15 RX ADMIN — ENOXAPARIN SODIUM 40 MG: 100 INJECTION SUBCUTANEOUS at 17:10

## 2024-03-15 RX ADMIN — ACETAMINOPHEN 325MG 650 MG: 325 TABLET ORAL at 20:02

## 2024-03-15 RX ADMIN — GUAIFENESIN 1200 MG: 600 TABLET, EXTENDED RELEASE ORAL at 08:50

## 2024-03-15 RX ADMIN — SODIUM CHLORIDE 3 G: 900 INJECTION INTRAVENOUS at 22:29

## 2024-03-15 RX ADMIN — INSULIN LISPRO 2 UNITS: 100 INJECTION, SOLUTION INTRAVENOUS; SUBCUTANEOUS at 08:50

## 2024-03-15 RX ADMIN — 0.12% CHLORHEXIDINE GLUCONATE 15 ML: 1.2 RINSE ORAL at 11:33

## 2024-03-15 RX ADMIN — POTASSIUM CHLORIDE 10 MEQ: 750 TABLET, EXTENDED RELEASE ORAL at 15:44

## 2024-03-15 RX ADMIN — METOCLOPRAMIDE 10 MG: 5 INJECTION, SOLUTION INTRAMUSCULAR; INTRAVENOUS at 05:05

## 2024-03-15 RX ADMIN — ACETAMINOPHEN 325MG 650 MG: 325 TABLET ORAL at 11:33

## 2024-03-15 RX ADMIN — POTASSIUM CHLORIDE 10 MEQ: 750 TABLET, EXTENDED RELEASE ORAL at 08:50

## 2024-03-15 RX ADMIN — GUAIFENESIN 1200 MG: 600 TABLET, EXTENDED RELEASE ORAL at 20:00

## 2024-03-15 RX ADMIN — INSULIN LISPRO 2 UNITS: 100 INJECTION, SOLUTION INTRAVENOUS; SUBCUTANEOUS at 22:28

## 2024-03-15 RX ADMIN — CETIRIZINE HYDROCHLORIDE 10 MG: 10 TABLET ORAL at 15:44

## 2024-03-15 RX ADMIN — ACETAMINOPHEN 325MG 650 MG: 325 TABLET ORAL at 15:44

## 2024-03-15 RX ADMIN — ACETAMINOPHEN 1000 MG: 1000 INJECTION INTRAVENOUS at 03:52

## 2024-03-15 RX ADMIN — CALCIUM GLUCONATE 1000 MG: 20 INJECTION, SOLUTION INTRAVENOUS at 05:54

## 2024-03-15 RX ADMIN — ASPIRIN 81 MG: 81 TABLET, COATED ORAL at 08:51

## 2024-03-15 RX ADMIN — MUPIROCIN 1 APPLICATION: 20 OINTMENT TOPICAL at 08:50

## 2024-03-15 RX ADMIN — OXYCODONE 10 MG: 5 TABLET ORAL at 09:33

## 2024-03-15 RX ADMIN — DOCUSATE SODIUM 50MG AND SENNOSIDES 8.6MG 2 TABLET: 8.6; 5 TABLET, FILM COATED ORAL at 20:01

## 2024-03-15 RX ADMIN — ATORVASTATIN CALCIUM 40 MG: 20 TABLET, FILM COATED ORAL at 20:01

## 2024-03-15 RX ADMIN — PANTOPRAZOLE SODIUM 40 MG: 40 TABLET, DELAYED RELEASE ORAL at 07:04

## 2024-03-15 RX ADMIN — SODIUM CHLORIDE 3 G: 900 INJECTION INTRAVENOUS at 13:27

## 2024-03-15 RX ADMIN — INSULIN LISPRO 2 UNITS: 100 INJECTION, SOLUTION INTRAVENOUS; SUBCUTANEOUS at 16:38

## 2024-03-15 RX ADMIN — FUROSEMIDE 20 MG: 10 INJECTION, SOLUTION INTRAMUSCULAR; INTRAVENOUS at 08:50

## 2024-03-15 RX ADMIN — SODIUM CHLORIDE 3 G: 900 INJECTION INTRAVENOUS at 05:05

## 2024-03-15 RX ADMIN — FUROSEMIDE 20 MG: 10 INJECTION, SOLUTION INTRAMUSCULAR; INTRAVENOUS at 15:44

## 2024-03-15 NOTE — PROGRESS NOTES
" LOS: 4 days   Patient Care Team:  Carol Vanessa MD as PCP - General (General Practice)  Savanah Mitchell MD as Consulting Physician (Radiation Oncology)  Lebron Truong MD as Consulting Physician (Otolaryngology)    Chief Complaint: Post op     Subjective     Subjective:  Symptoms:  Stable.  He reports weakness.  No shortness of breath, chest pain or anxiety.    Diet:  No nausea or vomiting.    Activity level: Impaired due to weakness.    Pain:  He complains of pain that is mild.  Pain is well controlled.        Objective     Vital Signs  Temp:  [96.6 °F (35.9 °C)-100.8 °F (38.2 °C)] 99.9 °F (37.7 °C)  Heart Rate:  [] 76  Resp:  [16-24] 24  BP: ()/(54-87) 104/60  FiO2 (%):  [40 %-100 %] 40 %  Body mass index is 45.59 kg/m².    Intake/Output Summary (Last 24 hours) at 3/15/2024 0715  Last data filed at 3/15/2024 0400  Gross per 24 hour   Intake 3174.97 ml   Output 2820 ml   Net 354.97 ml     No intake/output data recorded.    Chest tube drainage last 8 hours -- 40ml out of mediastinal (2) chest tubes     Wt Readings from Last 3 Encounters:   03/15/24 136 kg (299 lb 13.2 oz)   12/03/19 (!) 153 kg (337 lb 1 oz)   11/29/19 (!) 154 kg (339 lb)       Flowsheet Rows      Flowsheet Row First Filed Value   Admission Height 172.7 cm (68\") Documented at 03/11/2024 1700   Admission Weight 132 kg (290 lb 4.8 oz) Documented at 03/11/2024 1700            Objective:  General Appearance:  Comfortable and in no acute distress.    Vital signs: (most recent): Blood pressure 95/59, pulse 75, temperature 99.7 °F (37.6 °C), resp. rate 24, height 172.7 cm (68\"), weight 136 kg (299 lb 13.2 oz), SpO2 96%.  Vital signs are normal.  No fever.    Output: Producing urine and no stool output.    Lungs:  Normal effort and normal respiratory rate.  There are rales and decreased breath sounds.    Heart: Normal rate.  Irregular rhythm.    Abdomen: Abdomen is soft and non-distended.  Hypoactive bowel sounds.     Extremities: Normal " range of motion.  There is no dependent edema.    Neurological: Patient is alert and oriented to person, place and time.    Skin:  Warm and dry.  (Sternal dressing intact )              Results Review:        Results from last 7 days   Lab Units 03/15/24  0311 03/14/24  1538 03/14/24  1106   WBC 10*3/mm3 10.07 9.07 12.20*   HEMOGLOBIN g/dL 10.2* 11.7* 12.5*   HEMATOCRIT % 30.7* 34.4* 36.3*   PLATELETS 10*3/mm3 154 154 174         PT/INR:    Protime   Date Value Ref Range Status   03/15/2024 16.8 (H) 11.7 - 14.2 Seconds Final   03/14/2024 16.6 (H) 11.7 - 14.2 Seconds Final   03/13/2024 14.7 (H) 11.7 - 14.2 Seconds Final   /  INR   Date Value Ref Range Status   03/15/2024 1.34 (H) 0.90 - 1.10 Final   03/14/2024 1.32 (H) 0.90 - 1.10 Final   03/13/2024 1.13 (H) 0.90 - 1.10 Final       Results from last 7 days   Lab Units 03/15/24  0311 03/14/24  1538 03/14/24  1106   SODIUM mmol/L 144 142 140   POTASSIUM mmol/L 4.2 4.6 4.6   CHLORIDE mmol/L 112* 111* 109*   CO2 mmol/L 19.8* 20.4* 23.0   BUN mg/dL 18 17 19   CREATININE mg/dL 1.21 1.28* 1.30*   GLUCOSE mg/dL 132* 126* 148*   CALCIUM mg/dL 7.6* 7.8* 8.4*         Scheduled Meds:  acetaminophen, 650 mg, Oral, Q4H   Or  acetaminophen, 650 mg, Oral, Q4H   Or  acetaminophen, 650 mg, Rectal, Q4H  aspirin, 81 mg, Oral, Daily  atorvastatin, 40 mg, Oral, Nightly  ceFAZolin, 3 g, Intravenous, Q8H  chlorhexidine, 15 mL, Mouth/Throat, Q12H  enoxaparin, 40 mg, Subcutaneous, Daily  magnesium sulfate, 1 g, Intravenous, Q8H  metoclopramide, 10 mg, Intravenous, Q6H  metoprolol tartrate, 12.5 mg, Oral, Q12H  mupirocin, , Each Nare, BID  pantoprazole, 40 mg, Oral, QAM  senna-docusate sodium, 2 tablet, Oral, Nightly        Infusions:  clevidipine, 2-32 mg/hr  dexmedetomidine, 0.2-1.5 mcg/kg/hr, Last Rate: Stopped (03/14/24 3724)  DOPamine, 2-20 mcg/kg/min  EPINEPHrine, 0.02-0.2 mcg/kg/min  insulin, 0-100 Units/hr, Last Rate: 0.4 Units/hr (03/15/24 6179)  milrinone, 0.25-0.375  mcg/kg/min  niCARdipine, 5-15 mg/hr, Last Rate: Stopped (03/14/24 1312)  nitroglycerin, 5-200 mcg/min  norepinephrine, 0.02-0.3 mcg/kg/min, Last Rate: Stopped (03/14/24 1435)  phenylephrine, 0.2-2 mcg/kg/min  propofol, 5-50 mcg/kg/min, Last Rate: Stopped (03/14/24 1435)  sodium chloride, 30 mL/hr, Last Rate: 30 mL/hr (03/14/24 1214)          Assessment & Plan         Unstable angina    Aortic valve stenosis      Assessment & Plan    - Severe aortic valve stenosis   - Hypertension  - DM II -- A1c 7.3  - Hyperlipidemia  - Hx tonsillar cancer (treated with resection and chemotherapy -- 2020)  - Intermittent 2nd/3rd degree heart block    Patient looks great this morning, not on any drips other than insulin -- transition to SSI  Patient in 1st degree block this morning, normal rate -- EP evaluating patient   Patients BP is soft -- replete calcium, keep central line today, hold beta blocker   Patient on 4LNC, CXR with vascular congestion, patient net positive -- give 20mg IV lasix this morning with potassium replacement, keep zambrano for now and monitor urine output/response to diuretic   Chest tubes with total of 40ml out over 8 hours -- keep for now and see how output is later this afternoon after working with PT  Discontinue arterial line and swan   Add mucinex, encourage use of IS  Will monitor BP after lasix, if stable we will transfer patient to stepdown   Continue routine care, mobilize     Savita Pierre PA-C  03/15/24  07:15 EDT

## 2024-03-15 NOTE — THERAPY EVALUATION
Patient Name: Hayden Michaud  : 1965    MRN: [id number]                              Today's Date: 3/15/2024       Admit Date: 3/11/2024    Visit Dx:     ICD-10-CM ICD-9-CM   1. Aortic valve stenosis, etiology of cardiac valve disease unspecified  I35.0 424.1   2. S/P AVR (aortic valve replacement)  Z95.2 V43.3     Patient Active Problem List   Diagnosis    Unstable angina    Aortic valve stenosis     Past Medical History:   Diagnosis Date    Diabetes mellitus     Heart murmur     Hyperlipidemia     Hypertension     Skin abnormalities     ble  uses a cream    Swollen tonsil     left side     Past Surgical History:   Procedure Laterality Date    AORTIC VALVE REPAIR/REPLACEMENT N/A 3/14/2024    Procedure: INTRA OP MARTIN, STERNOTOMY, AORTIC VALVE REPLACEMENT, EXCLUSION OF LEFT ATRIAL APPENDAGE, PRP;  Surgeon: Solomon West MD;  [facility];  Service: Cardiothoracic;  Laterality: N/A;    CARDIAC CATHETERIZATION N/A 3/12/2024    Procedure: Right Heart Cath;  Surgeon: Hayden Abdullahi MD;  Location: [facility] INVASIVE LOCATION;  Service: Cardiovascular;  Laterality: N/A;    CARDIAC CATHETERIZATION N/A 3/12/2024    Procedure: Left Heart Cath;  Surgeon: Hayden Abdullahi MD;  Location: [facility] CATH INVASIVE LOCATION;  Service: Cardiovascular;  Laterality: N/A;    CARDIAC CATHETERIZATION N/A 3/12/2024    Procedure: Coronary angiography;  Surgeon: Hayden Abdullahi MD;  Location: [facility] INVASIVE LOCATION;  Service: Cardiovascular;  Laterality: N/A;    COLONOSCOPY      ENDOSCOPY      TONSILLECTOMY Left 12/3/2019    Procedure: LEFT TONSIL BIOPSY;  Surgeon: Lebron Truong MD;  Location: [facility] OR;  Service: ENT    WISDOM TOOTH EXTRACTION        General Information       Row Name 03/15/24 0902          Physical Therapy Time and Intention    Document Type evaluation (P)   -     Mode of Treatment individual therapy;physical therapy (P)   -MH       Row Name 03/15/24 0902          General  Information    Patient Profile Reviewed yes (P)   -     Prior Level of Function independent:;ADL's (P)   -     Existing Precautions/Restrictions cardiac;oxygen therapy device and L/min;fall;sternal (P)   -     Barriers to Rehab medically complex (P)   -       Row Name 03/15/24 0902          Living Environment    People in Home spouse (P)   -       Row Name 03/15/24 0902          Home Main Entrance    Number of Stairs, Main Entrance one (P)   -       Row Name 03/15/24 0902          Stairs Within Home, Primary    Stairs Comment, Within Home, Primary Pt lives in a ranch style house with basement. Pt states that when he goes home he will be staying in the basement that has 1 MUSA. (P)   -       Row Name 03/15/24 0902          Cognition    Orientation Status (Cognition) oriented x 4;person;place;situation;time (P)   -       Row Name 03/15/24 0902          Safety Issues, Functional Mobility    Safety Issues Affecting Function (Mobility) sequencing abilities;safety precautions follow-through/compliance;safety precaution awareness (P)   -     Impairments Affecting Function (Mobility) endurance/activity tolerance;postural/trunk control;shortness of breath;balance (P)   -     Comment, Safety Issues/Impairments (Mobility) Pt had non-skid socks donned. TS needed for line management. (P)   -               User Key  (r) = Recorded By, (t) = Taken By, (c) = Cosigned By      Initials Name Provider Type     Snow Nick, PT Student PT Student                   Mobility       Row Name 03/15/24 0904          Bed Mobility    Comment, (Bed Mobility) Pt [facility] upon arrival. Bed mobility not tested this date. (P)   -       Row Name 03/15/24 0904          Bed-Chair Transfer    Bed-Chair [place] (Transfers) not tested (P)   -     Comment, (Bed-Chair Transfer) Not tested this date. (P)   -       Row Name 03/15/24 0904          Sit-Stand Transfer    Sit-Stand [place] (Transfers) nonverbal cues  (demo/gesture);verbal cues;contact guard;minimum assist (75% patient effort);2 person assist (P)   -     Assistive Device (Sit-Stand Transfers) other (see comments) (P)   HHA  -     Comment, (Sit-Stand Transfer) Pt fatigued quickly and needed mod VCs for sequencing transfer. Balance improved with HHA. (P)   -       Row Name 03/15/24 0904          Gait/Stairs (Locomotion)    [place] Level (Gait) verbal cues;nonverbal cues (demo/gesture);contact guard;minimum assist (75% patient effort);2 person assist (P)   -     Assistive Device (Gait) other (see comments) (P)   HHA  -     Patient was able to Ambulate yes (P)   -     Distance in Feet (Gait) 35 (P)   -     Deviations/Abnormal Patterns (Gait) base of support, narrow;otis decreased;stride length decreased (P)   -     [place] Level (Stairs) not tested (P)   -     Comment, (Gait/Stairs) Pt ambulated 5 ft forward and 5 ft backwards, needed a sitting rest break PRN. Pt then was able to ambulate 25 feet with CGA/min A and HHA. Pt needed mod VCs to utilize PLB technique during gait. (P)   -               User Key  (r) = Recorded By, (t) = Taken By, (c) = Cosigned By      Initials Name Provider Type     Snow Nick, PT Student PT Student                   Obj/Interventions       Row Name 03/15/24 0909          Range of Motion Comprehensive    General Range of Motion no range of motion deficits identified (P)   -     Comment, General Range of Motion LE MMT WNL (P)   -       Row Name 03/15/24 0909          Strength Comprehensive (MMT)    Comment, General Manual Muscle Testing (MMT) Assessment 3+/5 gross MMT for LE (P)   -       Row Name 03/15/24 0909          Motor Skills    Therapeutic Exercise -- (P)   cardiac protocol x 5, 2 STS  -       Row Name 03/15/24 0909          Balance    Balance Assessment sitting static balance;standing static balance;standing dynamic balance;sit to stand dynamic balance (P)   -     Static Sitting  Balance standby assist;non-verbal cues (demo/gesture);verbal cues (P)   -     Position, Sitting Balance unsupported;sitting in chair (P)   -     Sit to Stand Dynamic Balance verbal cues;non-verbal cues (demo/gesture);contact guard;minimal assist;2-person assist (P)   -MH     Static Standing Balance verbal cues;non-verbal cues (demo/gesture);contact guard;minimal assist;2-person assist (P)   -     Dynamic Standing Balance verbal cues;non-verbal cues (demo/gesture);contact guard;minimal assist;2-person assist (P)   -     Position/Device Used, Standing Balance supported;other (see comments) (P)   HHA  -MH     Balance Interventions sitting;standing;sit to stand;supported;static;dynamic (P)   -     Comment, Balance Pt had no overt LOB or veering noted, however was mildly unstead. Balance improved with HHA and min A x 2 at times. (P)   -               User Key  (r) = Recorded By, (t) = Taken By, (c) = Cosigned By      Initials Name Provider Type     Snow Nick, PT Student PT Student                   Goals/Plan       Row Name 03/15/24 0927          Bed Mobility Goal 1 (PT)    Activity/Assistive Device (Bed Mobility Goal 1, PT) sit to supine (P)   -MH     [place] Level/Cues Needed (Bed Mobility Goal 1, PT) supervision required (P)   -MH     Time Frame (Bed Mobility Goal 1, PT) 2 weeks;long term goal (LTG) (P)   -       Row Name 03/15/24 0927          Transfer Goal 1 (PT)    Activity/Assistive Device (Transfer Goal 1, PT) sit-to-stand/stand-to-sit (P)   -MH     [place] Level/Cues Needed (Transfer Goal 1, PT) supervision required (P)   -MH     Time Frame (Transfer Goal 1, PT) long term goal (LTG);2 weeks (P)   -       Row Name 03/15/24 0927          Gait Training Goal 1 (PT)    Activity/Assistive Device (Gait Training Goal 1, PT) gait (walking locomotion) (P)   -MH     [place] Level (Gait Training Goal 1, PT) standby assist (P)   -     Distance (Gait Training Goal 1, PT) 150 ft (P)    -     Time Frame (Gait Training Goal 1, PT) 2 weeks;long term goal (LTG) (P)   -               User Key  (r) = Recorded By, (t) = Taken By, (c) = Cosigned By      Initials Name Provider Type     Snow Nick, PT Student PT Student                   Clinical Impression       Row Name 03/15/24 0911          Pain    Pretreatment Pain Rating 6/10 (P)   -     Posttreatment Pain Rating 6/10 (P)   -     Pain Location - chest (P)   -     Pre/Posttreatment Pain Comment Pt states that his pain is a 6/10 upon inhalation around chest incision site. (P)   -     Pain Intervention(s) Repositioned;Nursing Notified;Rest (P)   -       Row Name 03/15/24 0911          Plan of Care Review    Plan of Care Reviewed With patient (P)   -     Progress improving (P)   -     Outcome Evaluation Pt agreeable to PT session this treatment session. Pt presents to [facility] secondary to SOA and chest pain with exertion while going up stairs a few days ago. Follow-up reveals severe aortic stenosis, mild-moderate mitral valve stenosis, and hyperdynamic systolic function. Pt is now s/p sternotomy, aortic valve replacement performed on 03/14/24. 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 Pt was A & O x 4 upon arrival of therapy. Pt states that he was independent at baseline and did not use AD prior to admission. Pt lives in a ranch style house with basement that has 1 MUSA. Pt lives with spouse. Nsg present to remove pulmonary catheter before mobility performed. Pt performed cardiac protocol exercises while sitting statically on edge of chair with SBA. Pt then performed 2 STS this date with CGA/min A x 2 and HHA. Pt's balance improved immediately with HHA, pt was then able to take forward and backward steps of around 10 ft total with CGA/min A x 2 and HHA. Pt had no overt LOB or veering, however was mildly unsteady and needed short sitting rest break PRN. Pt needed mod VCs to utilize PLB  technique. Pt was then able to ambulate 25 more ft with CGA/min A x2 and HHA. Pt was left in recliner chair with nsg present and call light in reach. Pt would continue to benefit from skilled therapy in order to increase balance deficits present and increase cardiovascular endurance. PT will continue to follow as pt progresses. (P)   -       Row Name 03/15/24 0911          Therapy Assessment/Plan (PT)    Rehab Potential (PT) good, to achieve stated therapy goals (P)   -     Criteria for Skilled Interventions Met (PT) yes (P)   -     Therapy Frequency (PT) daily (P)   -       Row Name 03/15/24 0911          Vital Signs    Pre Systolic BP Rehab 108 (P)   -     Pre Treatment Diastolic BP 61 (P)   -     Pretreatment Heart Rate (beats/min) 75 (P)   -     Pretreatment Resp Rate (breaths/min) 30 (P)   -     Pre SpO2 (%) 99 (P)   -     O2 Delivery Pre Treatment nasal cannula (P)   4 L  -MH     O2 Delivery Intra Treatment nasal cannula (P)   4 L  -     Post SpO2 (%) 99 (P)   -     O2 Delivery Post Treatment nasal cannula (P)   4 L  -     Pre Patient Position Sitting (P)   -     Intra Patient Position Standing (P)   -     Post Patient Position Sitting (P)   -     Rest Breaks  1 (P)   -       Row Name 03/15/24 0911          Positioning and Restraints    Pre-Treatment Position sitting in chair/recliner (P)   -     Post Treatment Position chair (P)   -     In Chair notified nsg;reclined;call light within reach;encouraged to call for assist;exit alarm on;with nsg;RUE elevated;LUE elevated (P)   -               User Key  (r) = Recorded By, (t) = Taken By, (c) = Cosigned By      Initials Name Provider Type     Snow Nick, PT Student PT Student                   Outcome Measures       Row Name 03/15/24 0922          How much help from another person do you currently need...    Turning from your back to your side while in flat bed without using bedrails? 2 (P)   -     Moving from  lying on back to sitting on the side of a flat bed without bedrails? 2 (P)   -MH     Moving to and from a bed to a chair (including a wheelchair)? 3 (P)   -MH     Standing up from a chair using your arms (e.g., wheelchair, bedside chair)? 3 (P)   -MH     Climbing 3-5 steps with a railing? 2 (P)   -MH     To walk in hospital room? 3 (P)   -     AM-PAC 6 Clicks Score (PT) 15 (P)   -     Highest Level of Mobility Goal 4 --> Transfer to chair/commode (P)   -       Row Name 03/15/24 0928          Functional Assessment    Outcome Measure Options AM-PAC 6 Clicks Basic Mobility (PT) (P)   -               User Key  (r) = Recorded By, (t) = Taken By, (c) = Cosigned By      Initials Name Provider Type     Snow Nick, PT Student PT Student                                 Physical Therapy Education       Title: PT OT SLP Therapies (Done)       Topic: Physical Therapy (Done)       Point: Mobility training (Done)       Learning Progress Summary             Patient Acceptance, E,TB, VU by  at 3/15/2024 0928                         Point: Home exercise program (Done)       Learning Progress Summary             Patient Acceptance, E,TB, VU by  at 3/15/2024 0928                         Point: Body mechanics (Done)       Learning Progress Summary             Patient Acceptance, E,TB, VU by  at 3/15/2024 0928                         Point: Precautions (Done)       Learning Progress Summary             Patient Acceptance, E,TB, VU by  at 3/15/2024 0928                                         User Key       Initials Effective Dates Name Provider Type [facility] 01/24/24 -  Snow Nick, PT Student PT Student PT                  PT Recommendation and Plan     Plan of Care Reviewed With: (P) patient  Progress: (P) improving  Outcome Evaluation: (P) Pt agreeable to PT session this treatment session. Pt presents to [facility] secondary to SOA and chest pain with exertion while going up stairs a few days ago.  Follow-up reveals severe aortic stenosis, mild-moderate mitral valve stenosis, and hyperdynamic systolic function. Pt is now s/p sternotomy, aortic valve replacement performed on 03/14/24. 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 Pt was A & O x 4 upon arrival of therapy. Pt states that he was independent at baseline and did not use AD prior to admission. Pt lives in a ranch style house with basement that has 1 MUSA. Pt lives with spouse. Nsg present to remove pulmonary catheter before mobility performed. Pt performed cardiac protocol exercises while sitting statically on edge of chair with SBA. Pt then performed 2 STS this date with CGA/min A x 2 and HHA. Pt's balance improved immediately with HHA, pt was then able to take forward and backward steps of around 10 ft total with CGA/min A x 2 and HHA. Pt had no overt LOB or veering, however was mildly unsteady and needed short sitting rest break PRN. Pt needed mod VCs to utilize PLB technique. Pt was then able to ambulate 25 more ft with CGA/min A x2 and HHA. Pt was left in recliner chair with nsg present and call light in reach. Pt would continue to benefit from skilled therapy in order to increase balance deficits present and increase cardiovascular endurance. PT will continue to follow as pt progresses.     Time Calculation:         PT Charges       Row Name 03/15/24 0930             Time Calculation    Start Time 0815 (P)   -      Stop Time 0841 (P)   -      Time Calculation (min) 26 min (P)   -      PT Received On 03/15/24 (P)   -      PT - Next Appointment 03/16/24 (P)   -      PT Goal Re-Cert Due Date 03/29/24 (P)   -         Time Calculation- PT    Total Timed Code Minutes- PT 8 minute(s) (P)   -         Timed Charges    74339 - PT Therapeutic Exercise Minutes -- (P)   -      15264 - PT Therapeutic Activity Minutes 8 (P)   -         Total Minutes    Timed Charges Total Minutes 8 (P)   -        Total Minutes 8 (P)   -                User Key  (r) = Recorded By, (t) = Taken By, (c) = Cosigned By      Initials Name Provider Type     Snow Nick, PT Student PT Student                  Therapy Charges for Today       Code Description Service Date Service Provider Modifiers Qty    [id number]  PT THERAPEUTIC ACT EA 15 MIN 3/15/2024 Snow Nick, PT Student GP 1    [id number]  PT EVAL MOD COMPLEXITY 3 3/15/2024 Snow Nick, PT Student GP 1    [id number]  PT THER SUPP EA 15 MIN 3/15/2024 Snow Nick, PT Student GP 1            PT G-Codes  Outcome Measure Options: (P) AM-PAC 6 Clicks Basic Mobility (PT)  AM-PAC 6 Clicks Score (PT): (P) 15  PT Discharge Summary  Anticipated Discharge Disposition (PT): (P) home with assist, home    Snow Nick PT Student  3/15/2024

## 2024-03-15 NOTE — OP NOTE
Operative Note    Date of Dictation: 03/14/24    Date of Procedure: 03/14/24    Referring Physician: Nicola Wagner MD    Preoperative diagnosis: Severe aortic stenosis    Postoperative diagnosis: Same    Procedure:   1. AVR with Tissue bioprosthesis Magna ease ·27  2. Left atrial appendage oclussion with atriclip #45mm    Surgeon: Solomon West MD     Assistants: RADHA Nath was responsible for performing the following activities: Cardiac Surgery First assist, surgical wound closure and their skilled assistance was necessary for the success of this case.     Anesthesia: General endotracheal anesthesia and MARTIN    Findings:  Severe bicuspid aortic valve stenosis.       Estimated Blood Loss:  400 mL of Cell Saver returned.    STS Data: The STS Risk score discussed with the patient and family.  Counseling was done regarding abuse of tobacco, alcohol and drugs as needed. They understand and wish to proceed. The antibiotics and b blockers were given in the STS required window.          Description of the procedure:     The patient was placed supine on the operative table. General anesthesia was given and lines placed. The patient was prepped and draped using the usual sterile technique. A median sternotomy was performed with a scalpel and the layers carried down to the sternum using the electrocautery. The sternum was split in the midline using a vertical oscillating saw. Hemostasis was achieved. It was of good quality. 300 units/kg of IV heparin was given to an ACT over 400. A Favaloro retractor was placed and the mediastinum exposed, the pericardium was opened and the edges tacked to the wound. Cannulation sutures were placed in the ascending aorta and right atrium. Small cannulas were placed and aorto right atrial cardiopulmonary bypass was started. Cardioplegia cannulas were placed. Cardiopulmonary bypass was then established for 75 minutes drifting to 34°C at appropriate flow rates.  The aorta was  crossclamped for 67 minutes and we gave 1000 cc of antegrade cold blood cardioplegia then 200L of retrograde cold blood cardioplegia and then repeated doses every 10 to 15 minutes to good effect. A transverse aortotomy was performed and the diseased valve exposed. The leaflets were resected and the annulus debrided and then the area irrigated. The annulus was sized to a 27 Magna ease prosthesis that was washed as recommended by the . 2.0 Ethibond pledgeted sutures were placed in a supra-annular fashion circumferentially around the annulus and the sutures passed through the sewing ring. The valve was descended and the knots secured with Corknot device. The aortotomy was closed with a double layer of 4.0 Prolene suture and de-aired before closure.  A 45 mm Atriclip device was applied for left atrial appendage occlusion.  A warm dose of cardioplegia was given and then the aortic clamp removed. The heart was paced till regular atrial rhythm resumed. I allowed the heart to eject and once hemodynamics were acceptable, then the CPB was discontinued and the venous and cardioplegia cannulas removed. The matching dose of protamine was given and the aortic cannula removed as well. AV temporary wires and mediastinal chest tubes were placed and the wound sprayed with platelet rich plasma. The sternum was closed with single and double wires and soft tissue in layers of reabsorbable material. The wounds were covered with sterile dressings.       Specimen removed:  none    CPB time:  75 minutes.    Aortic clamp time:  67 minutes    Complications:  none           Disposition: Cardiovascular recovery room           Condition: Critical but stable.

## 2024-03-15 NOTE — PLAN OF CARE
Goal Outcome Evaluation:  Plan of Care Reviewed With: patient        Pt POD 1, oxygenating well on 2L NC. Pain well managed with medication. Temporary pacer set backup at AAI 60, 10, 0.4 and pt mantaining NSR with 1st degree block in the 70's. Pt ready to transfer to CVU pending bed avaliability. Family has been at bedside and plan of care has been discussed with them.

## 2024-03-15 NOTE — PROGRESS NOTES
"Hayden Michaud  1965 58 y.o.  [id number]      Patient Care Team:  Carol Vanessa MD as PCP - General (General Practice)  Savanah Mitchell MD as Consulting Physician (Radiation Oncology)  Lebron Truong MD as Consulting Physician (Otolaryngology)    CC: Obesity, diabetes, coronary disease, probable aortic bicuspid valve, aortic stenosis aortic insufficiency mitral annular calcification, complete heart block, no severe coronary disease, normal LV function, status post AVR    Interval History: He looks pretty good and about as expected on postop day 1      Objective   Vital Signs  Temp:  [96.6 °F (35.9 °C)-100.8 °F (38.2 °C)] 99.7 °F (37.6 °C)  Heart Rate:  [] 75  Resp:  [16-24] 24  BP: ()/(54-87) 95/59  FiO2 (%):  [40 %-100 %] 40 %    Intake/Output Summary (Last 24 hours) at 3/15/2024 0849  Last data filed at 3/15/2024 0802  Gross per 24 hour   Intake 4236.84 ml   Output 3015 ml   Net 1221.84 ml     Flowsheet Rows      Flowsheet Row First Filed Value   Admission Height 172.7 cm (68\") Documented at 03/11/2024 1700   Admission Weight 132 kg (290 lb 4.8 oz) Documented at 03/11/2024 1700            Physical Exam:   General Appearance:    Alert,oriented, in no acute distress   Lungs:     Clear to auscultation,BS are equal    Heart:    Normal S1 and S2, RRR with 3 out of 6 late peaking systolic ejection and a 1 out of 6 diastolic decrescendo murmur, gallop or rub   HEENT:    Sclerae are clear, no JVD or adenopathy   Abdomen:     Normal bowel sounds, soft nontender, nondistended, no HSM   Extremities:   Moves all extremities well, no edema, no cyanosis, no             Redness, no rash     Medication Review:      acetaminophen, 650 mg, Oral, Q4H   Or  acetaminophen, 650 mg, Oral, Q4H   Or  acetaminophen, 650 mg, Rectal, Q4H  aspirin, 81 mg, Oral, Daily  atorvastatin, 40 mg, Oral, Nightly  calcium gluconate, 1,000 mg, Intravenous, Once  ceFAZolin, 3 g, Intravenous, Q8H  chlorhexidine, 15 mL, Mouth/Throat, " Q12H  enoxaparin, 40 mg, Subcutaneous, Daily  furosemide, 20 mg, Intravenous, Once  guaiFENesin, 1,200 mg, Oral, Q12H  insulin lispro, 2-9 Units, Subcutaneous, 4x Daily AC & at Bedtime  magnesium sulfate, 1 g, Intravenous, Q8H  metoclopramide, 10 mg, Intravenous, Q6H  metoprolol tartrate, 12.5 mg, Oral, Q12H  mupirocin, , Each Nare, BID  pantoprazole, 40 mg, Oral, QAM  potassium chloride, 10 mEq, Oral, Once  senna-docusate sodium, 2 tablet, Oral, Nightly      clevidipine, 2-32 mg/hr  dexmedetomidine, 0.2-1.5 mcg/kg/hr, Last Rate: Stopped (03/14/24 1555)  DOPamine, 2-20 mcg/kg/min  EPINEPHrine, 0.02-0.2 mcg/kg/min  milrinone, 0.25-0.375 mcg/kg/min  niCARdipine, 5-15 mg/hr, Last Rate: Stopped (03/14/24 1312)  nitroglycerin, 5-200 mcg/min  norepinephrine, 0.02-0.3 mcg/kg/min, Last Rate: Stopped (03/14/24 1435)  phenylephrine, 0.2-2 mcg/kg/min  propofol, 5-50 mcg/kg/min, Last Rate: Stopped (03/14/24 1435)  sodium chloride, 30 mL/hr, Last Rate: 30 mL/hr (03/14/24 1214)          I reviewed the patient's new clinical results.  I personally viewed and interpreted the patient's EKG/Telemetry data    Assessment/Plan  Active Hospital Problems    Diagnosis  POA    **Unstable angina [I20.0]  Yes    Aortic valve stenosis [I35.0]  Unknown      Resolved Hospital Problems   No resolved problems to display.     He has severe aortic stenosis and complete heart block, presenting around the same time.  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  So it seems that his valves are prematurely aging for some reason.  It is not immediately clear to me.  I think down the road we should get a parathyroid hormone on him as well as a lipoprotein a level.  We will continue with routine postoperative care.  He is going to require a permanent pacemaker probably on Monday    Wally Alvarez MD  03/15/24  08:49 EDT

## 2024-03-15 NOTE — PLAN OF CARE
Goal Outcome Evaluation:  Plan of Care Reviewed With: (P) patient        Progress: (P) improving  Outcome Evaluation: (P) Pt agreeable to PT session this treatment session. Pt presents to [facility] secondary to SOA and chest pain with exertion while going up stairs a few days ago. Follow-up reveals severe aortic stenosis, mild-moderate mitral valve stenosis, and hyperdynamic systolic function. Pt is now s/p sternotomy, aortic valve replacement performed on 03/14/24. 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 Pt was A & O x 4 upon arrival of therapy. Pt states that he was independent at baseline and did not use AD prior to admission. Pt lives in a ranch style house with basement that has 1 MUSA. Pt lives with spouse. Nsg present to remove pulmonary catheter before mobility performed. Pt performed cardiac protocol exercises while sitting statically on edge of chair with SBA. Pt then performed 2 STS this date with CGA/min A x 2 and HHA. Pt's balance improved immediately with HHA, pt was then able to take forward and backward steps of around 10 ft total with CGA/min A x 2 and HHA. Pt had no overt LOB or veering, however was mildly unsteady and needed short sitting rest break PRN. Pt needed mod VCs to utilize PLB technique. Pt was then able to ambulate 25 more ft with CGA/min A x2 and HHA. Pt was left in recliner chair with nsg present and call light in reach. Pt would continue to benefit from skilled therapy in order to increase balance deficits present and increase cardiovascular endurance. PT will continue to follow as pt progresses.      Anticipated Discharge Disposition (PT): (P) home with assist, home

## 2024-03-15 NOTE — PROGRESS NOTES
Electrophysiology Follow-Up Note      Patient Name: Hayden Michaud  Age/Sex: 58 y.o. male  : 1965  MRN: [id number]    Date of Admission: 3/11/2024  Day of Service: 03/15/24       Chief Complaint: Complete heart block     HPI:   Hayden Michaud is a 58 y.o. male who initially presented to with worsening shortness of breath and chest discomfort to OP clinic.     In clinic 3/11/2024, EKG showed a type II second degree heart block and he was taken to the cath lab for a heart cath for suspected unstable angina due to hx of coronary artery calcifications.     He had subsequent heart cath 3/12/2024 with no significant coronary disease but suspected severe valvular disease..  A MARTIN was completed showing severe AV stenosis on 3/13/2024.     3/14/2024 S/p AVR with tissue bioprosthesis Magna ease 27 valve with PARISH occlusion with atriclip #45    Electrophysiology has been following remotely to monitor arrhythmia to evaluate for possible PPM once recovered from valve surgery.    PMH: Diabetes,  Severe aortic valve stenosis s/p AVR, tonsillar cancer with prior resection and chemo, HTN    Follow up:  03/15/24 POD #1 patient has temporary pacemaker in place that is currently turned off.  He has underlying rhythm of prolonged first-degree AV block.  He denies any dizziness, lightheadedness or near syncope.  We had discussion about risks and benefits of pacemaker today and he was agreeable.  His mother does have a pacemaker and a possible history of what sounds like hypertrophic cardiomyopathy.      Temp:  [96.6 °F (35.9 °C)-100.8 °F (38.2 °C)] 99.7 °F (37.6 °C)  Heart Rate:  [] 75  Resp:  [16-24] 24  BP: ()/(54-87) 95/59  FiO2 (%):  [40 %-100 %] 40 %     PHYSICAL EXAM    General: 58 y.o. male fatigued,  laying in bed. Alert and Oriented.   Neck: No JVD or carotid bruit  Lungs: Clear to ausculation bilaterally, symmetric  Heart: Regular rate and rhythm with no overt murmurs, rubs or gallops. Normal S1  and S2. Midline sternal incision present   Abdomen: soft, non-tender  Extremities: No lower extremity edema or cyanosis.   Neuo: no lateralizing defects.        Telemetry/EK/15/24: significantly prolonged first degree hear block            I personally viewed and interpreted the patient's EKG/Telemetry data.    Previous testing:   MARTIN 3/13/2024:  EF 70% with moderate LVH, Grade II DD. Trileaflet AV with severe AV stenosis with mean gradient 43 mmHg and ISABEL 0/81 cm2. Mild to moderate MV stenosis, Mild MR    Heart Cath 3/12/2024: Angiographically normal coronary arteries, mild pulmonary HTN with RVSP 28 mmHg          Lab Review:   Results from last 7 days   Lab Units 03/15/24  0311 24  1538 24  1106   SODIUM mmol/L 144 142 140   POTASSIUM mmol/L 4.2 4.6 4.6   CHLORIDE mmol/L 112* 111* 109*   CO2 mmol/L 19.8* 20.4* 23.0   BUN mg/dL 18 17 19   CREATININE mg/dL 1.21 1.28* 1.30*   GLUCOSE mg/dL 132* 126* 148*   CALCIUM mg/dL 7.6* 7.8* 8.4*     Results from last 7 days   Lab Units 03/15/24  0311 24  1538 24  1106   WBC 10*3/mm3 10.07 9.07 12.20*   HEMOGLOBIN g/dL 10.2* 11.7* 12.5*   HEMATOCRIT % 30.7* 34.4* 36.3*   PLATELETS 10*3/mm3 154 154 174     Results from last 7 days   Lab Units 03/15/24  0311 24  1106 24  1740   INR  1.34* 1.32* 1.13*     Results from last 7 days   Lab Units 24  1739   HSTROP T ng/L 19 19     Results from last 7 days   Lab Units 24  1739   TSH uIU/mL 3.370           Current Medications:   Scheduled Meds:acetaminophen, 650 mg, Oral, Q4H   Or  acetaminophen, 650 mg, Oral, Q4H   Or  acetaminophen, 650 mg, Rectal, Q4H  aspirin, 81 mg, Oral, Daily  atorvastatin, 40 mg, Oral, Nightly  calcium gluconate, 1,000 mg, Intravenous, Once  ceFAZolin, 3 g, Intravenous, Q8H  chlorhexidine, 15 mL, Mouth/Throat, Q12H  enoxaparin, 40 mg, Subcutaneous, Daily  furosemide, 20 mg, Intravenous, Once  insulin lispro, 2-9 Units, Subcutaneous, 4x Daily AC  & at Bedtime  magnesium sulfate, 1 g, Intravenous, Q8H  metoclopramide, 10 mg, Intravenous, Q6H  metoprolol tartrate, 12.5 mg, Oral, Q12H  mupirocin, , Each Nare, BID  pantoprazole, 40 mg, Oral, QAM  potassium chloride, 10 mEq, Oral, Once  senna-docusate sodium, 2 tablet, Oral, Nightly          Assessment /Plan:  Conduction disease-preoperatively he was in a Mobitz type II second-degree heart block/complete heart block and postoperatively he is having a significantly prolonged LA interval on native heart rhythm today.  Suspect he has long-term conduction disease and will likely benefit from a pacemaker depending on postoperative course.  Tentatively planning for this to be on Monday.  Currently receiving Lovenox 40 mg. Hold Lovenox on Sunday for device.  Beta-blocker currently held.  S/p AVR  with tissue bioprosthesis Magna ease 27 valve- POD #1.  Progressing well.  CT surgery following.  On aspirin, statin    Lauro Malave PA-C  03/15/24  08:05 EDT

## 2024-03-16 ENCOUNTER — APPOINTMENT (OUTPATIENT)
Dept: GENERAL RADIOLOGY | Facility: HOSPITAL | Age: 59
DRG: 217 | End: 2024-03-16
Payer: COMMERCIAL

## 2024-03-16 LAB
ANION GAP SERPL CALCULATED.3IONS-SCNC: 11.3 MMOL/L (ref 5–15)
BH BB BLOOD EXPIRATION DATE: NORMAL
BH BB BLOOD EXPIRATION DATE: NORMAL
BH BB BLOOD TYPE BARCODE: 5100
BH BB BLOOD TYPE BARCODE: 5100
BH BB DISPENSE STATUS: NORMAL
BH BB DISPENSE STATUS: NORMAL
BH BB PRODUCT CODE: NORMAL
BH BB PRODUCT CODE: NORMAL
BH BB UNIT NUMBER: NORMAL
BH BB UNIT NUMBER: NORMAL
BUN SERPL-MCNC: 20 MG/DL (ref 6–20)
BUN/CREAT SERPL: 17.9 (ref 7–25)
CALCIUM SPEC-SCNC: 8.3 MG/DL (ref 8.6–10.5)
CHLORIDE SERPL-SCNC: 107 MMOL/L (ref 98–107)
CO2 SERPL-SCNC: 20.7 MMOL/L (ref 22–29)
CREAT SERPL-MCNC: 1.12 MG/DL (ref 0.76–1.27)
CROSSMATCH INTERPRETATION: NORMAL
CROSSMATCH INTERPRETATION: NORMAL
DEPRECATED RDW RBC AUTO: 39.1 FL (ref 37–54)
EGFRCR SERPLBLD CKD-EPI 2021: 76.6 ML/MIN/1.73
ERYTHROCYTE [DISTWIDTH] IN BLOOD BY AUTOMATED COUNT: 12.8 % (ref 12.3–15.4)
GLUCOSE BLDC GLUCOMTR-MCNC: 153 MG/DL (ref 70–130)
GLUCOSE BLDC GLUCOMTR-MCNC: 203 MG/DL (ref 70–130)
GLUCOSE BLDC GLUCOMTR-MCNC: 208 MG/DL (ref 70–130)
GLUCOSE BLDC GLUCOMTR-MCNC: 217 MG/DL (ref 70–130)
GLUCOSE SERPL-MCNC: 149 MG/DL (ref 65–99)
HCT VFR BLD AUTO: 33.2 % (ref 37.5–51)
HGB BLD-MCNC: 11.2 G/DL (ref 13–17.7)
MCH RBC QN AUTO: 28.9 PG (ref 26.6–33)
MCHC RBC AUTO-ENTMCNC: 33.7 G/DL (ref 31.5–35.7)
MCV RBC AUTO: 85.6 FL (ref 79–97)
PLATELET # BLD AUTO: 172 10*3/MM3 (ref 140–450)
PMV BLD AUTO: 10.4 FL (ref 6–12)
POTASSIUM SERPL-SCNC: 4.2 MMOL/L (ref 3.5–5.2)
QT INTERVAL: 357 MS
QTC INTERVAL: 427 MS
RBC # BLD AUTO: 3.88 10*6/MM3 (ref 4.14–5.8)
SODIUM SERPL-SCNC: 139 MMOL/L (ref 136–145)
UNIT  ABO: NORMAL
UNIT  ABO: NORMAL
UNIT  RH: NORMAL
UNIT  RH: NORMAL
WBC NRBC COR # BLD AUTO: 12.87 10*3/MM3 (ref 3.4–10.8)

## 2024-03-16 PROCEDURE — 93005 ELECTROCARDIOGRAM TRACING: CPT | Performed by: NURSE PRACTITIONER

## 2024-03-16 PROCEDURE — 25010000002 FUROSEMIDE PER 20 MG: Performed by: NURSE PRACTITIONER

## 2024-03-16 PROCEDURE — 85027 COMPLETE CBC AUTOMATED: CPT | Performed by: NURSE PRACTITIONER

## 2024-03-16 PROCEDURE — 82948 REAGENT STRIP/BLOOD GLUCOSE: CPT

## 2024-03-16 PROCEDURE — 99232 SBSQ HOSP IP/OBS MODERATE 35: CPT | Performed by: INTERNAL MEDICINE

## 2024-03-16 PROCEDURE — 93010 ELECTROCARDIOGRAM REPORT: CPT | Performed by: INTERNAL MEDICINE

## 2024-03-16 PROCEDURE — 71045 X-RAY EXAM CHEST 1 VIEW: CPT

## 2024-03-16 PROCEDURE — 97110 THERAPEUTIC EXERCISES: CPT

## 2024-03-16 PROCEDURE — 63710000001 INSULIN LISPRO (HUMAN) PER 5 UNITS

## 2024-03-16 PROCEDURE — 80048 BASIC METABOLIC PNL TOTAL CA: CPT | Performed by: NURSE PRACTITIONER

## 2024-03-16 PROCEDURE — 25010000002 ENOXAPARIN PER 10 MG: Performed by: PHYSICIAN ASSISTANT

## 2024-03-16 RX ORDER — FUROSEMIDE 10 MG/ML
40 INJECTION INTRAMUSCULAR; INTRAVENOUS ONCE
Status: COMPLETED | OUTPATIENT
Start: 2024-03-16 | End: 2024-03-16

## 2024-03-16 RX ORDER — POTASSIUM CHLORIDE 750 MG/1
20 TABLET, FILM COATED, EXTENDED RELEASE ORAL ONCE
Status: COMPLETED | OUTPATIENT
Start: 2024-03-16 | End: 2024-03-16

## 2024-03-16 RX ADMIN — HYDROCODONE BITARTRATE AND ACETAMINOPHEN 2 TABLET: 5; 325 TABLET ORAL at 04:59

## 2024-03-16 RX ADMIN — 0.12% CHLORHEXIDINE GLUCONATE 15 ML: 1.2 RINSE ORAL at 00:31

## 2024-03-16 RX ADMIN — PANTOPRAZOLE SODIUM 40 MG: 40 TABLET, DELAYED RELEASE ORAL at 04:58

## 2024-03-16 RX ADMIN — MUPIROCIN 1 APPLICATION: 20 OINTMENT TOPICAL at 08:01

## 2024-03-16 RX ADMIN — ENOXAPARIN SODIUM 40 MG: 100 INJECTION SUBCUTANEOUS at 08:00

## 2024-03-16 RX ADMIN — INSULIN LISPRO 4 UNITS: 100 INJECTION, SOLUTION INTRAVENOUS; SUBCUTANEOUS at 22:09

## 2024-03-16 RX ADMIN — 0.12% CHLORHEXIDINE GLUCONATE 15 ML: 1.2 RINSE ORAL at 11:22

## 2024-03-16 RX ADMIN — HYDROCODONE BITARTRATE AND ACETAMINOPHEN 2 TABLET: 5; 325 TABLET ORAL at 10:16

## 2024-03-16 RX ADMIN — FUROSEMIDE 40 MG: 10 INJECTION, SOLUTION INTRAMUSCULAR; INTRAVENOUS at 09:46

## 2024-03-16 RX ADMIN — DOCUSATE SODIUM 50MG AND SENNOSIDES 8.6MG 2 TABLET: 8.6; 5 TABLET, FILM COATED ORAL at 22:10

## 2024-03-16 RX ADMIN — HYDROCODONE BITARTRATE AND ACETAMINOPHEN 2 TABLET: 5; 325 TABLET ORAL at 22:09

## 2024-03-16 RX ADMIN — ATORVASTATIN CALCIUM 40 MG: 20 TABLET, FILM COATED ORAL at 22:10

## 2024-03-16 RX ADMIN — ASPIRIN 81 MG: 81 TABLET, COATED ORAL at 08:01

## 2024-03-16 RX ADMIN — GUAIFENESIN 1200 MG: 600 TABLET, EXTENDED RELEASE ORAL at 22:10

## 2024-03-16 RX ADMIN — INSULIN LISPRO 4 UNITS: 100 INJECTION, SOLUTION INTRAVENOUS; SUBCUTANEOUS at 11:22

## 2024-03-16 RX ADMIN — HYDROCODONE BITARTRATE AND ACETAMINOPHEN 2 TABLET: 5; 325 TABLET ORAL at 16:32

## 2024-03-16 RX ADMIN — MUPIROCIN 1 APPLICATION: 20 OINTMENT TOPICAL at 22:11

## 2024-03-16 RX ADMIN — INSULIN LISPRO 2 UNITS: 100 INJECTION, SOLUTION INTRAVENOUS; SUBCUTANEOUS at 06:46

## 2024-03-16 RX ADMIN — POTASSIUM CHLORIDE 20 MEQ: 750 TABLET, EXTENDED RELEASE ORAL at 09:46

## 2024-03-16 RX ADMIN — LEVOTHYROXINE SODIUM 75 MCG: 75 TABLET ORAL at 04:59

## 2024-03-16 RX ADMIN — CETIRIZINE HYDROCHLORIDE 10 MG: 10 TABLET ORAL at 08:01

## 2024-03-16 RX ADMIN — ACETAMINOPHEN 325MG 650 MG: 325 TABLET ORAL at 00:31

## 2024-03-16 RX ADMIN — OXYCODONE 10 MG: 5 TABLET ORAL at 00:31

## 2024-03-16 RX ADMIN — INSULIN LISPRO 4 UNITS: 100 INJECTION, SOLUTION INTRAVENOUS; SUBCUTANEOUS at 16:32

## 2024-03-16 RX ADMIN — GUAIFENESIN 1200 MG: 600 TABLET, EXTENDED RELEASE ORAL at 08:01

## 2024-03-16 NOTE — PLAN OF CARE
Goal Outcome Evaluation:              Outcome Evaluation: Improved activity tolerance and independence w/ mobility duing PT today.  Able to stand w/ SBA using RW.  Ambulated 100' w/ CGA using RW, required 2 standing rest beraks. Limited by SOB and fatigue.  Recommend continuing to ambulate in mcdonald 3x/day.  Recommend DC to home with assist as needed.      Anticipated Discharge Disposition (PT): home with assist, home

## 2024-03-16 NOTE — PROGRESS NOTES
"Hayden Michaud  1965 58 y.o.  [id number]      Patient Care Team:  Carol Vanessa MD as PCP - General (General Practice)  Savanah Mitchell MD as Consulting Physician (Radiation Oncology)  Lebron Truong MD as Consulting Physician (Otolaryngology)    CC: Obesity, diabetes, coronary disease, probable aortic bicuspid valve, aortic stenosis aortic insufficiency mitral annular calcification, complete heart block, no severe coronary disease, normal LV function, status post AVR    Interval History: He is doing well postop day 2      Objective   Vital Signs  Temp:  [98.8 °F (37.1 °C)-100.4 °F (38 °C)] 98.8 °F (37.1 °C)  Heart Rate:  [78-94] 94  Resp:  [18-24] 20  BP: (115-139)/(70-88) 119/71    Intake/Output Summary (Last 24 hours) at 3/16/2024 0901  Last data filed at 3/16/2024 0817  Gross per 24 hour   Intake 1289.3 ml   Output 1480 ml   Net -190.7 ml     Flowsheet Rows      Flowsheet Row First Filed Value   Admission Height 172.7 cm (68\") Documented at 03/11/2024 1700   Admission Weight 132 kg (290 lb 4.8 oz) Documented at 03/11/2024 1700            Physical Exam:   General Appearance:    Alert,oriented, in no acute distress   Lungs:     Clear to auscultation,BS are equal    Heart:    Normal S1 and S2, RRR with 3 out of 6 late peaking systolic ejection and a 1 out of 6 diastolic decrescendo murmur, gallop or rub   HEENT:    Sclerae are clear, no JVD or adenopathy   Abdomen:     Normal bowel sounds, soft nontender, nondistended, no HSM   Extremities:   Moves all extremities well, no edema, no cyanosis, no             Redness, no rash     Medication Review:      aspirin, 81 mg, Oral, Daily  atorvastatin, 40 mg, Oral, Nightly  cetirizine, 10 mg, Oral, Daily  chlorhexidine, 15 mL, Mouth/Throat, Q12H  furosemide, 40 mg, Intravenous, Once  guaiFENesin, 1,200 mg, Oral, Q12H  insulin lispro, 2-9 Units, Subcutaneous, 4x Daily AC & at Bedtime  levothyroxine, 75 mcg, Oral, Q AM  mupirocin, , Each Nare, BID  pantoprazole, 40 " mg, Oral, QAM  potassium chloride, 20 mEq, Oral, Once  senna-docusate sodium, 2 tablet, Oral, Nightly      sodium chloride, 30 mL/hr, Last Rate: 30 mL/hr (03/14/24 1214)          I reviewed the patient's new clinical results.  I personally viewed and interpreted the patient's EKG/Telemetry data    Assessment/Plan  Active Hospital Problems    Diagnosis  POA    **Unstable angina [I20.0]  Yes    Aortic valve stenosis [I35.0]  Unknown      Resolved Hospital Problems   No resolved problems to display.     He has severe aortic stenosis and complete heart block, presenting around the same time.  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  So it seems that his valves are prematurely aging for some reason.  It is not immediately clear to me.  I think down the road we should get a parathyroid hormone on him as well as a lipoprotein a level.  We will continue with routine postoperative care.  He is going to require a permanent pacemaker probably on Monday    He is doing well today hopefully he will get his chest tubes out we are going to give him a little bit of IV diuretic.  Continue with routine postop care    Wally Alvarez MD  03/16/24  09:01 EDT

## 2024-03-16 NOTE — PROGRESS NOTES
" LOS: 5 days   Patient Care Team:  Carol Vanessa MD as PCP - General (General Practice)  Savanah Mitchell MD as Consulting Physician (Radiation Oncology)  Lebron Truong MD as Consulting Physician (Otolaryngology)    Chief Complaint: post op    Subjective:  Symptoms:  No shortness of breath, cough or chest pain.    Diet:  Adequate intake.  No nausea or vomiting.    Activity level: Returning to normal.    Pain:  He complains of pain that is mild.  Pain is well controlled.          Vital Signs  Temp:  [98.8 °F (37.1 °C)-100.6 °F (38.1 °C)] 98.8 °F (37.1 °C)  Heart Rate:  [75-91] 87  Resp:  [18-24] 20  BP: ()/(62-88) 122/76  Body mass index is 45.59 kg/m².    Intake/Output Summary (Last 24 hours) at 3/16/2024 0759  Last data filed at 3/16/2024 0430  Gross per 24 hour   Intake 1093.03 ml   Output 1620 ml   Net -526.97 ml     No intake/output data recorded.    Chest tube drainage last 8 hours: 10        03/13/24  0300 03/13/24  2130 03/15/24  0600   Weight: 130 kg (285 lb 9.6 oz) 131 kg (289 lb 6.4 oz) 136 kg (299 lb 13.2 oz)       Objective:  General Appearance:  Comfortable and in no acute distress.    Vital signs: (most recent): Blood pressure 119/71, pulse 94, temperature 98.8 °F (37.1 °C), temperature source Oral, resp. rate 20, height 172.7 cm (68\"), weight 136 kg (299 lb 13.2 oz), SpO2 94%.  Vital signs are normal.  No fever.    Output: Producing urine and no stool output.    Lungs:  Normal effort and normal respiratory rate.  There are rales and decreased breath sounds.    Heart: Normal rate.  Regular rhythm.    Abdomen: Abdomen is soft.  Bowel sounds are normal.     Extremities: There is no dependent edema.    Pulses: Distal pulses are intact.    Neurological: Patient is alert and oriented to person, place and time.    Skin:  Warm and dry.  (Sternal dressing clean, dry, and intact)          Results Review:        WBC WBC   Date Value Ref Range Status   03/16/2024 12.87 (H) 3.40 - 10.80 10*3/mm3 Final "   03/15/2024 10.07 3.40 - 10.80 10*3/mm3 Final   03/14/2024 9.07 3.40 - 10.80 10*3/mm3 Final   03/14/2024 12.20 (H) 3.40 - 10.80 10*3/mm3 Final   03/13/2024 6.59 3.40 - 10.80 10*3/mm3 Final      HGB Hemoglobin   Date Value Ref Range Status   03/16/2024 11.2 (L) 13.0 - 17.7 g/dL Final   03/15/2024 10.2 (L) 13.0 - 17.7 g/dL Final   03/14/2024 11.7 (L) 13.0 - 17.7 g/dL Final   03/14/2024 12.5 (L) 13.0 - 17.7 g/dL Final   03/14/2024 10.2 (L) 12.0 - 17.0 g/dL Final   03/14/2024 10.9 (L) 12.0 - 17.0 g/dL Final   03/14/2024 10.9 (L) 12.0 - 17.0 g/dL Final   03/14/2024 10.9 (L) 12.0 - 17.0 g/dL Final   03/14/2024 10.2 (L) 12.0 - 17.0 g/dL Final   03/14/2024 12.6 12.0 - 17.0 g/dL Final   03/13/2024 14.4 13.0 - 17.7 g/dL Final      HCT Hematocrit   Date Value Ref Range Status   03/16/2024 33.2 (L) 37.5 - 51.0 % Final   03/15/2024 30.7 (L) 37.5 - 51.0 % Final   03/14/2024 34.4 (L) 37.5 - 51.0 % Final   03/14/2024 36.3 (L) 37.5 - 51.0 % Final   03/14/2024 30 (L) 38 - 51 % Final   03/14/2024 32 (L) 38 - 51 % Final   03/14/2024 32 (L) 38 - 51 % Final   03/14/2024 32 (L) 38 - 51 % Final   03/14/2024 30 (L) 38 - 51 % Final   03/14/2024 37 (L) 38 - 51 % Final   03/13/2024 43.1 37.5 - 51.0 % Final      Platelets Platelets   Date Value Ref Range Status   03/16/2024 172 140 - 450 10*3/mm3 Final   03/15/2024 154 140 - 450 10*3/mm3 Final   03/14/2024 154 140 - 450 10*3/mm3 Final   03/14/2024 174 140 - 450 10*3/mm3 Final   03/13/2024 254 140 - 450 10*3/mm3 Final        PT/INR:    Protime   Date Value Ref Range Status   03/15/2024 16.8 (H) 11.7 - 14.2 Seconds Final   03/14/2024 16.6 (H) 11.7 - 14.2 Seconds Final   03/13/2024 14.7 (H) 11.7 - 14.2 Seconds Final   /  INR   Date Value Ref Range Status   03/15/2024 1.34 (H) 0.90 - 1.10 Final   03/14/2024 1.32 (H) 0.90 - 1.10 Final   03/13/2024 1.13 (H) 0.90 - 1.10 Final       Sodium Sodium   Date Value Ref Range Status   03/16/2024 139 136 - 145 mmol/L Final   03/15/2024 144 136 - 145 mmol/L  Final   03/14/2024 142 136 - 145 mmol/L Final   03/14/2024 140 136 - 145 mmol/L Final   03/14/2024 142 136 - 145 mmol/L Final   03/13/2024 141 136 - 145 mmol/L Final      Potassium Potassium   Date Value Ref Range Status   03/16/2024 4.2 3.5 - 5.2 mmol/L Final   03/15/2024 4.2 3.5 - 5.2 mmol/L Final   03/14/2024 4.6 3.5 - 5.2 mmol/L Final   03/14/2024 4.6 3.5 - 5.2 mmol/L Final   03/14/2024 4.1 3.5 - 5.2 mmol/L Final     Comment:     Slight hemolysis detected by analyzer. Result may be falsely elevated.   03/13/2024 4.5 3.5 - 5.2 mmol/L Final      Chloride Chloride   Date Value Ref Range Status   03/16/2024 107 98 - 107 mmol/L Final   03/15/2024 112 (H) 98 - 107 mmol/L Final   03/14/2024 111 (H) 98 - 107 mmol/L Final   03/14/2024 109 (H) 98 - 107 mmol/L Final   03/14/2024 105 98 - 107 mmol/L Final   03/13/2024 104 98 - 107 mmol/L Final      Bicarbonate CO2   Date Value Ref Range Status   03/16/2024 20.7 (L) 22.0 - 29.0 mmol/L Final   03/15/2024 19.8 (L) 22.0 - 29.0 mmol/L Final   03/14/2024 20.4 (L) 22.0 - 29.0 mmol/L Final   03/14/2024 23.0 22.0 - 29.0 mmol/L Final   03/14/2024 24.3 22.0 - 29.0 mmol/L Final   03/13/2024 25.0 22.0 - 29.0 mmol/L Final      BUN BUN   Date Value Ref Range Status   03/16/2024 20 6 - 20 mg/dL Final   03/15/2024 18 6 - 20 mg/dL Final   03/14/2024 17 6 - 20 mg/dL Final   03/14/2024 19 6 - 20 mg/dL Final   03/14/2024 21 (H) 6 - 20 mg/dL Final   03/13/2024 21 (H) 6 - 20 mg/dL Final      Creatinine Creatinine   Date Value Ref Range Status   03/16/2024 1.12 0.76 - 1.27 mg/dL Final   03/15/2024 1.21 0.76 - 1.27 mg/dL Final   03/14/2024 1.28 (H) 0.76 - 1.27 mg/dL Final   03/14/2024 1.30 (H) 0.76 - 1.27 mg/dL Final   03/14/2024 1.11 0.76 - 1.27 mg/dL Final   03/13/2024 1.24 0.76 - 1.27 mg/dL Final      Calcium Calcium   Date Value Ref Range Status   03/16/2024 8.3 (L) 8.6 - 10.5 mg/dL Final   03/15/2024 7.6 (L) 8.6 - 10.5 mg/dL Final   03/14/2024 7.8 (L) 8.6 - 10.5 mg/dL Final   03/14/2024 8.4  (L) 8.6 - 10.5 mg/dL Final   03/14/2024 9.1 8.6 - 10.5 mg/dL Final   03/13/2024 9.8 8.6 - 10.5 mg/dL Final      Magnesium Magnesium   Date Value Ref Range Status   03/15/2024 2.4 1.6 - 2.6 mg/dL Final   03/14/2024 2.6 1.6 - 2.6 mg/dL Final   03/14/2024 3.1 (H) 1.6 - 2.6 mg/dL Final   03/13/2024 1.7 1.6 - 2.6 mg/dL Final          aspirin, 81 mg, Oral, Daily  atorvastatin, 40 mg, Oral, Nightly  cetirizine, 10 mg, Oral, Daily  chlorhexidine, 15 mL, Mouth/Throat, Q12H  enoxaparin, 40 mg, Subcutaneous, Daily  guaiFENesin, 1,200 mg, Oral, Q12H  insulin lispro, 2-9 Units, Subcutaneous, 4x Daily AC & at Bedtime  levothyroxine, 75 mcg, Oral, Q AM  mupirocin, , Each Nare, BID  pantoprazole, 40 mg, Oral, QAM  senna-docusate sodium, 2 tablet, Oral, Nightly      sodium chloride, 30 mL/hr, Last Rate: 30 mL/hr (03/14/24 1214)      Assessment & Plan  - Severe aortic valve stenosis s/p AVR;PARISH closure POD#2 [place]  - Hypertension  - DM II -- A1c 7.3  - Hyperlipidemia  - DAVID with home CPAP  - Hx tonsillar cancer (treated with resection and chemotherapy -- 2020)  - Intermittent 2nd/3rd degree heart block--beta blocker on hold  - leukocytosis--reactive  - post op anemia--expected acute blood loss     Looks good this morning, up in the chair  Remains in SR with 1st degree AVB. CA interval is very long. Did have intermittent 2nd/3rd degree block before surgery. Beta blocker remains on hold. Plan for PPM on Monday per EP  Weaned to RA and tolerating. Using CPAP with sleep  CXR with persistent vascular congestion--IV diurese  Mobilize/aggressive pulmonary toilet--continue mucinex/IS  Discontinue chest tube and zambrano catheter. Will leave central line given rhythm issues  Continue routine care      Tania Blair, COURTNEY  03/16/24  07:59 EDT

## 2024-03-16 NOTE — THERAPY TREATMENT NOTE
Patient Name: Hayden Michaud  : 1965    MRN: [id number]                              Today's Date: 3/16/2024       Admit Date: 3/11/2024    Visit Dx:     ICD-10-CM ICD-9-CM   1. Aortic valve stenosis, etiology of cardiac valve disease unspecified  I35.0 424.1   2. S/P AVR (aortic valve replacement)  Z95.2 V43.3     Patient Active Problem List   Diagnosis    Unstable angina    Aortic valve stenosis     Past Medical History:   Diagnosis Date    Diabetes mellitus     Heart murmur     Hyperlipidemia     Hypertension     Skin abnormalities     ble  uses a cream    Swollen tonsil     left side     Past Surgical History:   Procedure Laterality Date    AORTIC VALVE REPAIR/REPLACEMENT N/A 3/14/2024    Procedure: INTRA OP MARTIN, STERNOTOMY, AORTIC VALVE REPLACEMENT, EXCLUSION OF LEFT ATRIAL APPENDAGE, PRP;  Surgeon: Solomon West MD;  [facility];  Service: Cardiothoracic;  Laterality: N/A;    CARDIAC CATHETERIZATION N/A 3/12/2024    Procedure: Right Heart Cath;  Surgeon: Hayden Abdullahi MD;  Location: [facility] INVASIVE LOCATION;  Service: Cardiovascular;  Laterality: N/A;    CARDIAC CATHETERIZATION N/A 3/12/2024    Procedure: Left Heart Cath;  Surgeon: Hayden Abdullahi MD;  Location: [facility] CATH INVASIVE LOCATION;  Service: Cardiovascular;  Laterality: N/A;    CARDIAC CATHETERIZATION N/A 3/12/2024    Procedure: Coronary angiography;  Surgeon: Hayden Abdullahi MD;  Location: [facility] INVASIVE LOCATION;  Service: Cardiovascular;  Laterality: N/A;    COLONOSCOPY      ENDOSCOPY      TONSILLECTOMY Left 12/3/2019    Procedure: LEFT TONSIL BIOPSY;  Surgeon: Lebron Truong MD;  Location: [facility] OR;  Service: ENT    WISDOM TOOTH EXTRACTION        General Information       Row Name 24 1633          Physical Therapy Time and Intention    Document Type therapy note (daily note)  -AR     Mode of Treatment physical therapy  -AR       Row Name 24 1633          General Information     Patient Profile Reviewed yes  -AR     Existing Precautions/Restrictions fall;sternal  -AR     Barriers to Rehab none identified  -AR       Row Name 03/16/24 1633          Cognition    Orientation Status (Cognition) oriented x 4  -AR               User Key  (r) = Recorded By, (t) = Taken By, (c) = Cosigned By      Initials Name Provider Type    AR Ledy Ruvalcaba, PT Physical Therapist                   Mobility       Row Name 03/16/24 1633          Bed Mobility    Bed Mobility supine-sit;sit-supine  -AR     Supine-Sit [place] (Bed Mobility) not tested  -AR     Sit-Supine [place] (Bed Mobility) not tested  -AR       Row Name 03/16/24 1633          Sit-Stand Transfer    Sit-Stand [place] (Transfers) standby assist  -AR     Assistive Device (Sit-Stand Transfers) walker, front-wheeled  -AR       Row Name 03/16/24 1633          Gait/Stairs (Locomotion)    [place] Level (Gait) contact guard  -AR     Assistive Device (Gait) walker, front-wheeled  -AR     Distance in Feet (Gait) 100  -AR     Pattern (Gait) step-through  -AR     Deviations/Abnormal Patterns (Gait) otis decreased;gait speed decreased  -AR               User Key  (r) = Recorded By, (t) = Taken By, (c) = Cosigned By      Initials Name Provider Type    AR Ledy Ruvalcaba, PT Physical Therapist                   Obj/Interventions       Row Name 03/16/24 1634          Motor Skills    Therapeutic Exercise --  cardiac ex 5x  -AR       Row Name 03/16/24 1634          Balance    Dynamic Standing Balance contact guard  -AR     Position/Device Used, Standing Balance walker, rolling  -AR               User Key  (r) = Recorded By, (t) = Taken By, (c) = Cosigned By      Initials Name Provider Type    Ledy Hernandez, PT Physical Therapist                   Goals/Plan    No documentation.                  Clinical Impression       Row Name 03/16/24 1634          Pain    Pretreatment Pain Rating 4/10  -AR     Posttreatment Pain Rating 4/10   -AR     Pain Location incisional  -AR     Pain Location - chest  -AR     Pain Intervention(s) Medication (See MAR);Repositioned  -AR       Row Name 03/16/24 1634          Plan of Care Review    Outcome Evaluation Improved activity tolerance and independence w/ mobility duing PT today.  Able to stand w/ SBA using RW.  Ambulated 100' w/ CGA using RW, required 2 standing rest beraks. Limited by SOB and fatigue.  Recommend continuing to ambulate in mcdonald 3x/day.  Recommend DC to home with assist as needed.  -AR       Row Name 03/16/24 1634          Therapy Assessment/Plan (PT)    Rehab Potential (PT) good, to achieve stated therapy goals  -AR     Therapy Frequency (PT) daily  -AR       Row Name 03/16/24 1634          Vital Signs    O2 Delivery Pre Treatment room air  -AR       Row Name 03/16/24 1634          Positioning and Restraints    Pre-Treatment Position sitting in chair/recliner  -AR     Post Treatment Position chair  -AR     In Chair sitting;call light within reach;encouraged to call for assist  -AR               User Key  (r) = Recorded By, (t) = Taken By, (c) = Cosigned By      Initials Name Provider Type    Ledy Hernandez, PT Physical Therapist                   Outcome Measures       Row Name 03/16/24 1636 03/16/24 0755       How much help from another person do you currently need...    Turning from your back to your side while in flat bed without using bedrails? 3  -AR 2  -AA    Moving from lying on back to sitting on the side of a flat bed without bedrails? 3  -AR 2  -AA    Moving to and from a bed to a chair (including a wheelchair)? 3  -AR 3  -AA    Standing up from a chair using your arms (e.g., wheelchair, bedside chair)? 3  -AR 3  -AA    Climbing 3-5 steps with a railing? 2  -AR 2  -AA    To walk in hospital room? 3  -AR 3  -AA    AM-PAC 6 Clicks Score (PT) 17  -AR 15  -AA    Highest Level of Mobility Goal 5 --> Static standing  -AR 4 --> Transfer to chair/commode  -AA      Row Name 03/16/24 5414           Functional Assessment    Outcome Measure Options AM-PAC 6 Clicks Basic Mobility (PT)  -AR               User Key  (r) = Recorded By, (t) = Taken By, (c) = Cosigned By      Initials Name Provider Type    AR Ledy Ruvalcaba, PT Physical Therapist    Young Ochoa, RN Registered Nurse                                 Physical Therapy Education       Title: PT OT SLP Therapies (In Progress)       Topic: Physical Therapy (In Progress)       Point: Mobility training (In Progress)       Learning Progress Summary             Patient Acceptance, E, NR by AR at 3/16/2024 1637    Acceptance, E,TB, VU by  at 3/15/2024 0928                         Point: Home exercise program (In Progress)       Learning Progress Summary             Patient Acceptance, E, NR by AR at 3/16/2024 1637    Acceptance, E,TB, VU by  at 3/15/2024 0928                         Point: Body mechanics (In Progress)       Learning Progress Summary             Patient Acceptance, E, NR by AR at 3/16/2024 1637    Acceptance, E,TB, VU by  at 3/15/2024 0928                         Point: Precautions (In Progress)       Learning Progress Summary             Patient Acceptance, E, NR by AR at 3/16/2024 1637    Acceptance, E,TB, VU by  at 3/15/2024 0928                                         User Key       Initials Effective Dates Name Provider Type Discipline    AR 06/16/21 -  Ledy Ruvalcaba, PT Physical Therapist PT     01/24/24 -  [place], CAS Student PT Student PT                  PT Recommendation and Plan     Outcome Evaluation: Improved activity tolerance and independence w/ mobility duing PT today.  Able to stand w/ SBA using RW.  Ambulated 100' w/ CGA using RW, required 2 standing rest beraks. Limited by SOB and fatigue.  Recommend continuing to ambulate in mcdonald 3x/day.  Recommend DC to home with assist as needed.     Time Calculation:         PT Charges       Row Name 03/16/24 1632             Time Calculation    Start  Time 1550  -AR      Stop Time 1612  -AR      Time Calculation (min) 22 min  -AR      PT Received On 03/16/24  -AR      PT - Next Appointment 03/17/24  -AR                User Key  (r) = Recorded By, (t) = Taken By, (c) = Cosigned By      Initials Name Provider Type    AR Ledy Ruvalcaba, PT Physical Therapist                  Therapy Charges for Today       Code Description Service Date Service Provider Modifiers Qty    40470600812 HC PT THER PROC EA 15 MIN 3/16/2024 Ledy Ruvalcaab, PT GP 1            PT G-Codes  Outcome Measure Options: AM-PAC 6 Clicks Basic Mobility (PT)  AM-PAC 6 Clicks Score (PT): 17  PT Discharge Summary  Anticipated Discharge Disposition (PT): home with assist, home    Ledy Ruvalcaba PT  3/16/2024

## 2024-03-17 ENCOUNTER — APPOINTMENT (OUTPATIENT)
Dept: GENERAL RADIOLOGY | Facility: HOSPITAL | Age: 59
DRG: 217 | End: 2024-03-17
Payer: COMMERCIAL

## 2024-03-17 LAB
ANION GAP SERPL CALCULATED.3IONS-SCNC: 12 MMOL/L (ref 5–15)
BUN SERPL-MCNC: 22 MG/DL (ref 6–20)
BUN/CREAT SERPL: 22.2 (ref 7–25)
CALCIUM SPEC-SCNC: 8.7 MG/DL (ref 8.6–10.5)
CHLORIDE SERPL-SCNC: 105 MMOL/L (ref 98–107)
CO2 SERPL-SCNC: 21 MMOL/L (ref 22–29)
CREAT SERPL-MCNC: 0.99 MG/DL (ref 0.76–1.27)
DEPRECATED RDW RBC AUTO: 39.6 FL (ref 37–54)
EGFRCR SERPLBLD CKD-EPI 2021: 88.9 ML/MIN/1.73
ERYTHROCYTE [DISTWIDTH] IN BLOOD BY AUTOMATED COUNT: 12.8 % (ref 12.3–15.4)
GLUCOSE BLDC GLUCOMTR-MCNC: 160 MG/DL (ref 70–130)
GLUCOSE BLDC GLUCOMTR-MCNC: 171 MG/DL (ref 70–130)
GLUCOSE BLDC GLUCOMTR-MCNC: 184 MG/DL (ref 70–130)
GLUCOSE BLDC GLUCOMTR-MCNC: 188 MG/DL (ref 70–130)
GLUCOSE SERPL-MCNC: 143 MG/DL (ref 65–99)
HCT VFR BLD AUTO: 32.8 % (ref 37.5–51)
HGB BLD-MCNC: 11.3 G/DL (ref 13–17.7)
MCH RBC QN AUTO: 29.7 PG (ref 26.6–33)
MCHC RBC AUTO-ENTMCNC: 34.5 G/DL (ref 31.5–35.7)
MCV RBC AUTO: 86.1 FL (ref 79–97)
PLATELET # BLD AUTO: 193 10*3/MM3 (ref 140–450)
PMV BLD AUTO: 10.6 FL (ref 6–12)
POTASSIUM SERPL-SCNC: 3.8 MMOL/L (ref 3.5–5.2)
POTASSIUM SERPL-SCNC: 4 MMOL/L (ref 3.5–5.2)
QT INTERVAL: 381 MS
QTC INTERVAL: 423 MS
RBC # BLD AUTO: 3.81 10*6/MM3 (ref 4.14–5.8)
SODIUM SERPL-SCNC: 138 MMOL/L (ref 136–145)
WBC NRBC COR # BLD AUTO: 13.32 10*3/MM3 (ref 3.4–10.8)

## 2024-03-17 PROCEDURE — 97530 THERAPEUTIC ACTIVITIES: CPT

## 2024-03-17 PROCEDURE — 93010 ELECTROCARDIOGRAM REPORT: CPT | Performed by: INTERNAL MEDICINE

## 2024-03-17 PROCEDURE — 82948 REAGENT STRIP/BLOOD GLUCOSE: CPT

## 2024-03-17 PROCEDURE — 80048 BASIC METABOLIC PNL TOTAL CA: CPT | Performed by: NURSE PRACTITIONER

## 2024-03-17 PROCEDURE — 93005 ELECTROCARDIOGRAM TRACING: CPT | Performed by: INTERNAL MEDICINE

## 2024-03-17 PROCEDURE — 99232 SBSQ HOSP IP/OBS MODERATE 35: CPT | Performed by: INTERNAL MEDICINE

## 2024-03-17 PROCEDURE — 85027 COMPLETE CBC AUTOMATED: CPT | Performed by: NURSE PRACTITIONER

## 2024-03-17 PROCEDURE — 71046 X-RAY EXAM CHEST 2 VIEWS: CPT

## 2024-03-17 PROCEDURE — 63710000001 INSULIN LISPRO (HUMAN) PER 5 UNITS

## 2024-03-17 PROCEDURE — 84132 ASSAY OF SERUM POTASSIUM: CPT | Performed by: INTERNAL MEDICINE

## 2024-03-17 RX ORDER — FUROSEMIDE 40 MG/1
40 TABLET ORAL DAILY
Status: DISCONTINUED | OUTPATIENT
Start: 2024-03-17 | End: 2024-03-18

## 2024-03-17 RX ORDER — POLYETHYLENE GLYCOL 3350 17 G/17G
17 POWDER, FOR SOLUTION ORAL DAILY
Status: DISCONTINUED | OUTPATIENT
Start: 2024-03-17 | End: 2024-03-19 | Stop reason: HOSPADM

## 2024-03-17 RX ORDER — POTASSIUM CHLORIDE 750 MG/1
20 TABLET, FILM COATED, EXTENDED RELEASE ORAL ONCE
Status: COMPLETED | OUTPATIENT
Start: 2024-03-17 | End: 2024-03-17

## 2024-03-17 RX ORDER — POTASSIUM CHLORIDE 750 MG/1
20 TABLET, FILM COATED, EXTENDED RELEASE ORAL DAILY
Status: DISCONTINUED | OUTPATIENT
Start: 2024-03-17 | End: 2024-03-19 | Stop reason: HOSPADM

## 2024-03-17 RX ADMIN — ASPIRIN 81 MG: 81 TABLET, COATED ORAL at 08:43

## 2024-03-17 RX ADMIN — INSULIN LISPRO 2 UNITS: 100 INJECTION, SOLUTION INTRAVENOUS; SUBCUTANEOUS at 20:40

## 2024-03-17 RX ADMIN — FUROSEMIDE 40 MG: 40 TABLET ORAL at 11:43

## 2024-03-17 RX ADMIN — ALPRAZOLAM 0.25 MG: 0.25 TABLET ORAL at 22:19

## 2024-03-17 RX ADMIN — HYDROCODONE BITARTRATE AND ACETAMINOPHEN 2 TABLET: 5; 325 TABLET ORAL at 05:11

## 2024-03-17 RX ADMIN — MUPIROCIN 1 APPLICATION: 20 OINTMENT TOPICAL at 20:32

## 2024-03-17 RX ADMIN — GUAIFENESIN 1200 MG: 600 TABLET, EXTENDED RELEASE ORAL at 20:31

## 2024-03-17 RX ADMIN — MUPIROCIN 1 APPLICATION: 20 OINTMENT TOPICAL at 08:43

## 2024-03-17 RX ADMIN — DOCUSATE SODIUM 50MG AND SENNOSIDES 8.6MG 2 TABLET: 8.6; 5 TABLET, FILM COATED ORAL at 20:32

## 2024-03-17 RX ADMIN — 0.12% CHLORHEXIDINE GLUCONATE 15 ML: 1.2 RINSE ORAL at 11:43

## 2024-03-17 RX ADMIN — PANTOPRAZOLE SODIUM 40 MG: 40 TABLET, DELAYED RELEASE ORAL at 05:11

## 2024-03-17 RX ADMIN — 0.12% CHLORHEXIDINE GLUCONATE 15 ML: 1.2 RINSE ORAL at 20:32

## 2024-03-17 RX ADMIN — POTASSIUM CHLORIDE 20 MEQ: 750 TABLET, EXTENDED RELEASE ORAL at 11:43

## 2024-03-17 RX ADMIN — INSULIN LISPRO 2 UNITS: 100 INJECTION, SOLUTION INTRAVENOUS; SUBCUTANEOUS at 07:07

## 2024-03-17 RX ADMIN — POTASSIUM CHLORIDE 20 MEQ: 750 TABLET, EXTENDED RELEASE ORAL at 05:25

## 2024-03-17 RX ADMIN — POLYETHYLENE GLYCOL 3350 17 G: 17 POWDER, FOR SOLUTION ORAL at 11:43

## 2024-03-17 RX ADMIN — HYDROCODONE BITARTRATE AND ACETAMINOPHEN 2 TABLET: 5; 325 TABLET ORAL at 20:39

## 2024-03-17 RX ADMIN — INSULIN LISPRO 2 UNITS: 100 INJECTION, SOLUTION INTRAVENOUS; SUBCUTANEOUS at 16:31

## 2024-03-17 RX ADMIN — LEVOTHYROXINE SODIUM 75 MCG: 75 TABLET ORAL at 05:25

## 2024-03-17 RX ADMIN — INSULIN LISPRO 2 UNITS: 100 INJECTION, SOLUTION INTRAVENOUS; SUBCUTANEOUS at 11:44

## 2024-03-17 RX ADMIN — ATORVASTATIN CALCIUM 40 MG: 20 TABLET, FILM COATED ORAL at 20:31

## 2024-03-17 RX ADMIN — GUAIFENESIN 1200 MG: 600 TABLET, EXTENDED RELEASE ORAL at 08:43

## 2024-03-17 RX ADMIN — CETIRIZINE HYDROCHLORIDE 10 MG: 10 TABLET ORAL at 08:43

## 2024-03-17 RX ADMIN — HYDROCODONE BITARTRATE AND ACETAMINOPHEN 2 TABLET: 5; 325 TABLET ORAL at 16:31

## 2024-03-17 NOTE — PLAN OF CARE
Goal Outcome Evaluation:  Plan of Care Reviewed With: patient        Progress: improving  Outcome Evaluation: Pt UIC in NAD, pleasant and cooperative. Pt performed cardiac ther ex 10 reps. He stood from recliner with CGA using r wx. Pt amb 170' with r wx and SBA - good balance; pt req 1 brief (30 sec) standing rest break during amb due to amb from increased conversation during amb. Pt returned to recliner with all needs met adn he was encouraged to amb with nsg staff throughout the day. Increased amb distance today with 1 rest break. Cont PT to address functional deficits and prepare for d/c home.

## 2024-03-17 NOTE — PROGRESS NOTES
" LOS: 6 days   Patient Care Team:  Carol Vanessa MD as PCP - General (General Practice)  Savanah Mitchell MD as Consulting Physician (Radiation Oncology)  Lebron Truong MD as Consulting Physician (Otolaryngology)    Chief Complaint: post op    Subjective:  Symptoms:  No shortness of breath, cough or chest pain.    Diet:  Adequate intake.  No nausea or vomiting.    Activity level: Returning to normal.    Pain:  He complains of pain that is mild.  Pain is well controlled.      Vital Signs  Temp:  [97.5 °F (36.4 °C)-98.8 °F (37.1 °C)] 97.7 °F (36.5 °C)  Heart Rate:  [81-95] 83  Resp:  [20] 20  BP: (117-149)/(71-95) 117/71  Body mass index is 44.85 kg/m².    Intake/Output Summary (Last 24 hours) at 3/17/2024 0807  Last data filed at 3/16/2024 2209  Gross per 24 hour   Intake 750 ml   Output 275 ml   Net 475 ml     No intake/output data recorded.          03/13/24  2130 03/15/24  0600 03/16/24  0700   Weight: 131 kg (289 lb 6.4 oz) 136 kg (299 lb 13.2 oz) 134 kg (295 lb)     Objective:  General Appearance:  Comfortable and in no acute distress.    Vital signs: (most recent): Blood pressure 126/81, pulse 86, temperature 98.9 °F (37.2 °C), temperature source Oral, resp. rate 20, height 172.7 cm (68\"), weight 134 kg (295 lb), SpO2 95%.  Vital signs are normal.  No fever.    Output: Producing urine and no stool output.    Lungs:  Normal effort and normal respiratory rate.  There are decreased breath sounds.    Heart: Normal rate.  Regular rhythm.  (SR with 1st degree AVB)  Abdomen: Abdomen is soft.  Bowel sounds are normal.     Extremities: There is no dependent edema.    Pulses: Distal pulses are intact.    Neurological: Patient is alert and oriented to person, place and time.    Skin:  Warm and dry.  (Sternal dressing clean, dry, and intact)          Results Review:        WBC WBC   Date Value Ref Range Status   03/17/2024 13.32 (H) 3.40 - 10.80 10*3/mm3 Final   03/16/2024 12.87 (H) 3.40 - 10.80 10*3/mm3 Final "   03/15/2024 10.07 3.40 - 10.80 10*3/mm3 Final   03/14/2024 9.07 3.40 - 10.80 10*3/mm3 Final   03/14/2024 12.20 (H) 3.40 - 10.80 10*3/mm3 Final      HGB Hemoglobin   Date Value Ref Range Status   03/17/2024 11.3 (L) 13.0 - 17.7 g/dL Final   03/16/2024 11.2 (L) 13.0 - 17.7 g/dL Final   03/15/2024 10.2 (L) 13.0 - 17.7 g/dL Final   03/14/2024 11.7 (L) 13.0 - 17.7 g/dL Final   03/14/2024 12.5 (L) 13.0 - 17.7 g/dL Final   03/14/2024 10.2 (L) 12.0 - 17.0 g/dL Final   03/14/2024 10.9 (L) 12.0 - 17.0 g/dL Final   03/14/2024 10.9 (L) 12.0 - 17.0 g/dL Final   03/14/2024 10.9 (L) 12.0 - 17.0 g/dL Final   03/14/2024 10.2 (L) 12.0 - 17.0 g/dL Final      HCT Hematocrit   Date Value Ref Range Status   03/17/2024 32.8 (L) 37.5 - 51.0 % Final   03/16/2024 33.2 (L) 37.5 - 51.0 % Final   03/15/2024 30.7 (L) 37.5 - 51.0 % Final   03/14/2024 34.4 (L) 37.5 - 51.0 % Final   03/14/2024 36.3 (L) 37.5 - 51.0 % Final   03/14/2024 30 (L) 38 - 51 % Final   03/14/2024 32 (L) 38 - 51 % Final   03/14/2024 32 (L) 38 - 51 % Final   03/14/2024 32 (L) 38 - 51 % Final   03/14/2024 30 (L) 38 - 51 % Final      Platelets Platelets   Date Value Ref Range Status   03/17/2024 193 140 - 450 10*3/mm3 Final   03/16/2024 172 140 - 450 10*3/mm3 Final   03/15/2024 154 140 - 450 10*3/mm3 Final   03/14/2024 154 140 - 450 10*3/mm3 Final   03/14/2024 174 140 - 450 10*3/mm3 Final        PT/INR:    Protime   Date Value Ref Range Status   03/15/2024 16.8 (H) 11.7 - 14.2 Seconds Final   03/14/2024 16.6 (H) 11.7 - 14.2 Seconds Final   /  INR   Date Value Ref Range Status   03/15/2024 1.34 (H) 0.90 - 1.10 Final   03/14/2024 1.32 (H) 0.90 - 1.10 Final       Sodium Sodium   Date Value Ref Range Status   03/17/2024 138 136 - 145 mmol/L Final   03/16/2024 139 136 - 145 mmol/L Final   03/15/2024 144 136 - 145 mmol/L Final   03/14/2024 142 136 - 145 mmol/L Final   03/14/2024 140 136 - 145 mmol/L Final      Potassium Potassium   Date Value Ref Range Status   03/17/2024 3.8 3.5 -  5.2 mmol/L Final   03/16/2024 4.2 3.5 - 5.2 mmol/L Final   03/15/2024 4.2 3.5 - 5.2 mmol/L Final   03/14/2024 4.6 3.5 - 5.2 mmol/L Final   03/14/2024 4.6 3.5 - 5.2 mmol/L Final      Chloride Chloride   Date Value Ref Range Status   03/17/2024 105 98 - 107 mmol/L Final   03/16/2024 107 98 - 107 mmol/L Final   03/15/2024 112 (H) 98 - 107 mmol/L Final   03/14/2024 111 (H) 98 - 107 mmol/L Final   03/14/2024 109 (H) 98 - 107 mmol/L Final      Bicarbonate CO2   Date Value Ref Range Status   03/17/2024 21.0 (L) 22.0 - 29.0 mmol/L Final   03/16/2024 20.7 (L) 22.0 - 29.0 mmol/L Final   03/15/2024 19.8 (L) 22.0 - 29.0 mmol/L Final   03/14/2024 20.4 (L) 22.0 - 29.0 mmol/L Final   03/14/2024 23.0 22.0 - 29.0 mmol/L Final      BUN BUN   Date Value Ref Range Status   03/17/2024 22 (H) 6 - 20 mg/dL Final   03/16/2024 20 6 - 20 mg/dL Final   03/15/2024 18 6 - 20 mg/dL Final   03/14/2024 17 6 - 20 mg/dL Final   03/14/2024 19 6 - 20 mg/dL Final      Creatinine Creatinine   Date Value Ref Range Status   03/17/2024 0.99 0.76 - 1.27 mg/dL Final   03/16/2024 1.12 0.76 - 1.27 mg/dL Final   03/15/2024 1.21 0.76 - 1.27 mg/dL Final   03/14/2024 1.28 (H) 0.76 - 1.27 mg/dL Final   03/14/2024 1.30 (H) 0.76 - 1.27 mg/dL Final      Calcium Calcium   Date Value Ref Range Status   03/17/2024 8.7 8.6 - 10.5 mg/dL Final   03/16/2024 8.3 (L) 8.6 - 10.5 mg/dL Final   03/15/2024 7.6 (L) 8.6 - 10.5 mg/dL Final   03/14/2024 7.8 (L) 8.6 - 10.5 mg/dL Final   03/14/2024 8.4 (L) 8.6 - 10.5 mg/dL Final      Magnesium Magnesium   Date Value Ref Range Status   03/15/2024 2.4 1.6 - 2.6 mg/dL Final   03/14/2024 2.6 1.6 - 2.6 mg/dL Final   03/14/2024 3.1 (H) 1.6 - 2.6 mg/dL Final          aspirin, 81 mg, Oral, Daily  atorvastatin, 40 mg, Oral, Nightly  cetirizine, 10 mg, Oral, Daily  chlorhexidine, 15 mL, Mouth/Throat, Q12H  guaiFENesin, 1,200 mg, Oral, Q12H  insulin lispro, 2-9 Units, Subcutaneous, 4x Daily AC & at Bedtime  levothyroxine, 75 mcg, Oral, Q  AM  mupirocin, , Each Nare, BID  pantoprazole, 40 mg, Oral, QAM  senna-docusate sodium, 2 tablet, Oral, Nightly      sodium chloride, 30 mL/hr, Last Rate: 30 mL/hr (03/14/24 1214)      Assessment & Plan  - Severe aortic valve stenosis s/p AVR;PARISH closure POD#3 [place]  - Hypertension  - DM II -- A1c 7.3  - Hyperlipidemia  - DAVID with home CPAP  - Hx tonsillar cancer (treated with resection and chemotherapy -- 2020)  - Intermittent 2nd/3rd degree heart block--beta blocker on hold  - leukocytosis--reactive  - post op anemia--expected acute blood loss     Looks good this morning, up in the chair  Remains in SR with 1st degree AVB. DE interval is very long. Did have and episode of atrial fibrillation overnight with controlled. Converted back to SR without intervention. He had intermittent 2nd/3rd degree block before surgery. Beta blocker remains on hold. Plan for PPM on Monday per EP. Leave epicardial wires today  Weaned to RA and tolerating. Using CPAP with sleep  CXR with small pleural effusions. Start oral diuretics, and replete electrolytes  Mobilize/aggressive pulmonary toilet--continue mucinex/IS  Increase bowel regimen  Will discuss central line removal with Dr. West  Continue routine care    COURTNEY Banegas  03/17/24  08:07 EDT

## 2024-03-17 NOTE — PROGRESS NOTES
RN called to report patient is now in rate controlled a fib. Asymptomatic. We will further address during rounds.

## 2024-03-17 NOTE — PROGRESS NOTES
"Hayden Michaud  1965 58 y.o.  [id number]      Patient Care Team:  Carol Vanessa MD as PCP - General (General Practice)  Savanah Mitchell MD as Consulting Physician (Radiation Oncology)  Lebron Truong MD as Consulting Physician (Otolaryngology)    CC: Obesity, diabetes, coronary disease, probable aortic bicuspid valve, aortic stenosis aortic insufficiency mitral annular calcification, complete heart block, no severe coronary disease, normal LV function, status post AVR    Interval History: He is doing well    Objective   Vital Signs  Temp:  [97.5 °F (36.4 °C)-98.9 °F (37.2 °C)] 98.9 °F (37.2 °C)  Heart Rate:  [81-94] 86  Resp:  [20] 20  BP: (117-149)/(71-95) 126/81    Intake/Output Summary (Last 24 hours) at 3/17/2024 1227  Last data filed at 3/17/2024 1014  Gross per 24 hour   Intake 870 ml   Output 750 ml   Net 120 ml     Flowsheet Rows      Flowsheet Row First Filed Value   Admission Height 172.7 cm (68\") Documented at 03/11/2024 1700   Admission Weight 132 kg (290 lb 4.8 oz) Documented at 03/11/2024 1700            Physical Exam:   General Appearance:    Alert,oriented, in no acute distress   Lungs:     Clear to auscultation,BS are equal    Heart:    Normal S1 and S2, RRR with 3 out of 6 late peaking systolic ejection and a 1 out of 6 diastolic decrescendo murmur, gallop or rub   HEENT:    Sclerae are clear, no JVD or adenopathy   Abdomen:     Normal bowel sounds, soft nontender, nondistended, no HSM   Extremities:   Moves all extremities well, no edema, no cyanosis, no             Redness, no rash     Medication Review:      aspirin, 81 mg, Oral, Daily  atorvastatin, 40 mg, Oral, Nightly  cetirizine, 10 mg, Oral, Daily  chlorhexidine, 15 mL, Mouth/Throat, Q12H  furosemide, 40 mg, Oral, Daily  guaiFENesin, 1,200 mg, Oral, Q12H  insulin lispro, 2-9 Units, Subcutaneous, 4x Daily AC & at Bedtime  levothyroxine, 75 mcg, Oral, Q AM  mupirocin, , Each Nare, BID  pantoprazole, 40 mg, Oral, QAM  polyethylene " glycol, 17 g, Oral, Daily  potassium chloride, 20 mEq, Oral, Daily  senna-docusate sodium, 2 tablet, Oral, Nightly      sodium chloride, 30 mL/hr, Last Rate: 30 mL/hr (03/14/24 1214)          I reviewed the patient's new clinical results.  I personally viewed and interpreted the patient's EKG/Telemetry data    Assessment/Plan  Active Hospital Problems    Diagnosis  POA    **Unstable angina [I20.0]  Yes    Aortic valve stenosis [I35.0]  Unknown      Resolved Hospital Problems   No resolved problems to display.     He has severe aortic stenosis and complete heart block, presenting around the same time.  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  So it seems that his valves are prematurely aging for some reason.  It is not immediately clear to me.  I think down the road we should get a parathyroid hormone on him as well as a lipoprotein a level.  We will continue with routine postoperative care.  He is going to require a permanent pacemaker probably on Monday    He is doing well and moving forward now we will get a pacer and him tomorrow continue with routine care    Wally Alvarez MD  03/17/24  12:27 EDT

## 2024-03-17 NOTE — THERAPY TREATMENT NOTE
Patient Name: Hayden Michaud  : 1965    MRN: [id number]                              Today's Date: 3/17/2024       Admit Date: 3/11/2024    Visit Dx:     ICD-10-CM ICD-9-CM   1. Aortic valve stenosis, etiology of cardiac valve disease unspecified  I35.0 424.1   2. S/P AVR (aortic valve replacement)  Z95.2 V43.3     Patient Active Problem List   Diagnosis    Unstable angina    Aortic valve stenosis     Past Medical History:   Diagnosis Date    Diabetes mellitus     Heart murmur     Hyperlipidemia     Hypertension     Skin abnormalities     ble  uses a cream    Swollen tonsil     left side     Past Surgical History:   Procedure Laterality Date    AORTIC VALVE REPAIR/REPLACEMENT N/A 3/14/2024    Procedure: INTRA OP MARTIN, STERNOTOMY, AORTIC VALVE REPLACEMENT, EXCLUSION OF LEFT ATRIAL APPENDAGE, PRP;  Surgeon: Solomon West MD;  [facility]  Service: Cardiothoracic;  Laterality: N/A;    CARDIAC CATHETERIZATION N/A 3/12/2024    Procedure: Right Heart Cath;  Surgeon: Hayden Abdullahi MD;  Location: [facility] INVASIVE LOCATION;  Service: Cardiovascular;  Laterality: N/A;    CARDIAC CATHETERIZATION N/A 3/12/2024    Procedure: Left Heart Cath;  Surgeon: Hayden Abdullahi MD;  Location: [facility] INVASIVE LOCATION;  Service: Cardiovascular;  Laterality: N/A;    CARDIAC CATHETERIZATION N/A 3/12/2024    Procedure: Coronary angiography;  Surgeon: Hayden Abdullahi MD;  Location: [facility] INVASIVE LOCATION;  Service: Cardiovascular;  Laterality: N/A;    COLONOSCOPY      ENDOSCOPY      TONSILLECTOMY Left 12/3/2019    Procedure: LEFT TONSIL BIOPSY;  Surgeon: Lebron Truong MD;  Location: [facility] OR;  Service: ENT    WISDOM TOOTH EXTRACTION        General Information       Row Name 24 1141          Physical Therapy Time and Intention    Document Type therapy note (daily note)  -DJ     Mode of Treatment individual therapy;physical therapy  -DJ       Row Name 24 1141           General Information    Patient Profile Reviewed yes  -DJ     Existing Precautions/Restrictions fall;sternal  -DJ       Row Name 03/17/24 1141          Cognition    Orientation Status (Cognition) oriented x 4  -DJ       Row Name 03/17/24 1141          Safety Issues, Functional Mobility    Comment, Safety Issues/Impairments (Mobility) nonskid socks  -DJ               User Key  (r) = Recorded By, (t) = Taken By, (c) = Cosigned By      Initials Name Provider Type    Alison Mondragon, PT Physical Therapist                   Mobility       Row Name 03/17/24 1141          Bed Mobility    Supine-Sit [place] (Bed Mobility) not tested  -DJ     Sit-Supine [place] (Bed Mobility) not tested  -DJ     Comment, (Bed Mobility) UIC  -DJ       Row Name 03/17/24 1141          Transfers    Comment, (Transfers) sit/stand from recliner  -DJ       Row Name 03/17/24 1141          Bed-Chair Transfer    Bed-Chair [place] (Transfers) not tested  -DJ       Row Name 03/17/24 1141          Sit-Stand Transfer    Sit-Stand [place] (Transfers) standby assist  -DJ     Assistive Device (Sit-Stand Transfers) walker, front-wheeled  -DJ     Comment, (Sit-Stand Transfer) vc to use legs to stand  -DJ       Row Name 03/17/24 1141          Gait/Stairs (Locomotion)    [place] Level (Gait) standby assist;verbal cues  -DJ     Assistive Device (Gait) walker, front-wheeled  -DJ     Pattern (Gait) step-through  -DJ     Deviations/Abnormal Patterns (Gait) otis decreased;gait speed decreased  -DJ     [place] Level (Stairs) not tested  -DJ     Comment, (Gait/Stairs) Pt amb 170' with r wx and SBA - good balance; pt req 1 brief (30 sec) standing rest break during amb due to amb from increased conversation during amb  -DJ               User Key  (r) = Recorded By, (t) = Taken By, (c) = Cosigned By      Initials Name Provider Type    Alison Mondragon, PT Physical Therapist                   Obj/Interventions       Row Name 03/17/24 1143           Motor Skills    Motor Skills functional endurance  -DJ     Therapeutic Exercise other (see comments)  cardiac ther ex 10 reps  -DJ       Row Name 03/17/24 1143          Balance    Balance Assessment standing static balance;standing dynamic balance  -DJ     Static Standing Balance standby assist;verbal cues  -DJ     Dynamic Standing Balance standby assist;verbal cues  -DJ     Position/Device Used, Standing Balance walker, front-wheeled;supported  -DJ     Balance Interventions sitting;standing;sit to stand;supported;weight shifting activity  -DJ     Comment, Balance slightly unsteady but no LOB  -DJ               User Key  (r) = Recorded By, (t) = Taken By, (c) = Cosigned By      Initials Name Provider Type    Alison Mondragon, PT Physical Therapist                   Goals/Plan    No documentation.                  Clinical Impression       Row Name 03/17/24 1144          Pain    Pre/Posttreatment Pain Comment c/o chest discomfort with coughing  -DJ     Pain Intervention(s) Splinting  -DJ       Row Name 03/17/24 1144          Plan of Care Review    Plan of Care Reviewed With patient  -DJ     Progress improving  -DJ     Outcome Evaluation Pt UIC in NAD, pleasant and cooperative. Pt performed cardiac ther ex 10 reps. He stood from recliner with CGA using r wx. Pt amb 170' with r wx and SBA - good balance; pt req 1 brief (30 sec) standing rest break during amb due to amb from increased conversation during amb. Pt returned to recliner with all needs met adn he was encouraged to amb with [facility] staff throughout the day. Increased amb distance today with 1 rest break. Cont PT to address functional deficits and prepare for d/c home.  -DJ       Row Name 03/17/24 1144          Therapy Assessment/Plan (PT)    Patient/Family Therapy Goals Statement (PT) home  -DJ     Criteria for Skilled Interventions Met (PT) skilled treatment is necessary  -DJ       Row Name 03/17/24 1144          Vital Signs    O2 Delivery Pre Treatment  room air  -DJ     O2 Delivery Intra Treatment room air  -DJ     O2 Delivery Post Treatment room air  -DJ     Pre Patient Position Sitting  -DJ     Intra Patient Position Standing  -DJ     Post Patient Position Sitting  -DJ       Row Name 03/17/24 1144          Positioning and Restraints    Pre-Treatment Position sitting in chair/recliner  -DJ     Post Treatment Position chair  -DJ     In Chair notified nsg;sitting;call light within reach;encouraged to call for assist  no exit alarm on when PT entered room  -DJ               User Key  (r) = Recorded By, (t) = Taken By, (c) = Cosigned By      Initials Name Provider Type    Alison Mondragon, PT Physical Therapist                   Outcome Measures       Row Name 03/17/24 1147 03/17/24 0844       How much help from another person do you currently need...    Turning from your back to your side while in flat bed without using bedrails? 3  -DJ 3  -AA    Moving from lying on back to sitting on the side of a flat bed without bedrails? 3  -DJ 3  -AA    Moving to and from a bed to a chair (including a wheelchair)? 3  -DJ 3  -AA    Standing up from a chair using your arms (e.g., wheelchair, bedside chair)? 3  -DJ 3  -AA    Climbing 3-5 steps with a railing? 2  -DJ 2  -AA    To walk in hospital room? 3  -DJ 3  -AA    AM-PAC 6 Clicks Score (PT) 17  -DJ 17  -AA    Highest Level of Mobility Goal 5 --> Static standing  -DJ 5 --> Static standing  -AA      Row Name 03/17/24 1147          Functional Assessment    Outcome Measure Options AM-PAC 6 Clicks Basic Mobility (PT)  -DJ               User Key  (r) = Recorded By, (t) = Taken By, (c) = Cosigned By      Initials Name Provider Type    Alison Mondragon, PT Physical Therapist    Young Ochoa RN Registered Nurse                                 Physical Therapy Education       Title: PT OT SLP Therapies (Done)       Topic: Physical Therapy (Done)       Point: Mobility training (Done)       Learning Progress Summary              Patient Acceptance, E, VU,NR by DJ at 3/17/2024 1147    Acceptance, E, NR by AR at 3/16/2024 1637    Acceptance, E,TB, VU by  at 3/15/2024 0928                         Point: Home exercise program (Done)       Learning Progress Summary             Patient Acceptance, E, VU,NR by DJ at 3/17/2024 1147    Acceptance, E, NR by AR at 3/16/2024 1637    Acceptance, E,TB, VU by  at 3/15/2024 0928                         Point: Body mechanics (Done)       Learning Progress Summary             Patient Acceptance, E, VU,NR by DJ at 3/17/2024 1147    Acceptance, E, NR by AR at 3/16/2024 1637    Acceptance, E,TB, VU by  at 3/15/2024 0928                         Point: Precautions (Done)       Learning Progress Summary             Patient Acceptance, E, VU,NR by DJ at 3/17/2024 1147    Acceptance, E, NR by AR at 3/16/2024 1637    Acceptance, E,TB, VU by  at 3/15/2024 0928                                         User Key       Initials Effective Dates Name Provider Type Discipline    AR 06/16/21 -  Ledy Ruvalcaba, PT Physical Therapist PT    DJ 10/25/19 -  Alison Flanagan, PT Physical Therapist PT     01/24/24 -  Snow Nick, CAS Student PT Student PT                  PT Recommendation and Plan     Plan of Care Reviewed With: patient  Progress: improving  Outcome Evaluation: Pt UIC in NAD, pleasant and cooperative. Pt performed cardiac ther ex 10 reps. He stood from recliner with CGA using r wx. Pt amb 170' with r wx and SBA - good balance; pt req 1 brief (30 sec) standing rest break during amb due to amb from increased conversation during amb. Pt returned to recliner with all needs met adn he was encouraged to amb with nsg staff throughout the day. Increased amb distance today with 1 rest break. Cont PT to address functional deficits and prepare for d/c home.     Time Calculation:         PT Charges       Row Name 03/17/24 1148             Time Calculation    Start Time 1121  -DJ      Stop Time 1140  -DJ       Time Calculation (min) 19 min  -DJ      PT Non-Billable Time (min) 10 min  -CIRILO      PT Received On 03/17/24  -CIRILO      PT - Next Appointment 03/18/24  -CIRILO                User Key  (r) = Recorded By, (t) = Taken By, (c) = Cosigned By      Initials Name Provider Type    Alison Mondragon PT Physical Therapist                  Therapy Charges for Today       Code Description Service Date Service Provider Modifiers Qty    [id number]  PT THERAPEUTIC ACT EA 15 MIN 3/17/2024 Alison Flanagan PT GP 1            PT G-Codes  Outcome Measure Options: AM-PAC 6 Clicks Basic Mobility (PT)  AM-PAC 6 Clicks Score (PT): Fernandez Flanagan PT  3/17/2024

## 2024-03-18 PROBLEM — I44.1 HEART BLOCK AV SECOND DEGREE: Status: ACTIVE | Noted: 2024-03-11

## 2024-03-18 LAB
ANION GAP SERPL CALCULATED.3IONS-SCNC: 12.4 MMOL/L (ref 5–15)
BUN SERPL-MCNC: 23 MG/DL (ref 6–20)
BUN/CREAT SERPL: 23.7 (ref 7–25)
CALCIUM SPEC-SCNC: 8.5 MG/DL (ref 8.6–10.5)
CHLORIDE SERPL-SCNC: 103 MMOL/L (ref 98–107)
CO2 SERPL-SCNC: 22.6 MMOL/L (ref 22–29)
CREAT SERPL-MCNC: 0.97 MG/DL (ref 0.76–1.27)
DEPRECATED RDW RBC AUTO: 37.9 FL (ref 37–54)
EGFRCR SERPLBLD CKD-EPI 2021: 91.1 ML/MIN/1.73
ERYTHROCYTE [DISTWIDTH] IN BLOOD BY AUTOMATED COUNT: 12.5 % (ref 12.3–15.4)
GLUCOSE BLDC GLUCOMTR-MCNC: 137 MG/DL (ref 70–130)
GLUCOSE BLDC GLUCOMTR-MCNC: 147 MG/DL (ref 70–130)
GLUCOSE BLDC GLUCOMTR-MCNC: 163 MG/DL (ref 70–130)
GLUCOSE BLDC GLUCOMTR-MCNC: 218 MG/DL (ref 70–130)
GLUCOSE SERPL-MCNC: 142 MG/DL (ref 65–99)
HCT VFR BLD AUTO: 32.3 % (ref 37.5–51)
HGB BLD-MCNC: 10.7 G/DL (ref 13–17.7)
LAB AP CASE REPORT: NORMAL
MCH RBC QN AUTO: 28.1 PG (ref 26.6–33)
MCHC RBC AUTO-ENTMCNC: 33.1 G/DL (ref 31.5–35.7)
MCV RBC AUTO: 84.8 FL (ref 79–97)
PATH REPORT.FINAL DX SPEC: NORMAL
PATH REPORT.GROSS SPEC: NORMAL
PLATELET # BLD AUTO: 237 10*3/MM3 (ref 140–450)
PMV BLD AUTO: 9.7 FL (ref 6–12)
POTASSIUM SERPL-SCNC: 4.1 MMOL/L (ref 3.5–5.2)
QT INTERVAL: 455 MS
QTC INTERVAL: 491 MS
RBC # BLD AUTO: 3.81 10*6/MM3 (ref 4.14–5.8)
SODIUM SERPL-SCNC: 138 MMOL/L (ref 136–145)
WBC NRBC COR # BLD AUTO: 9.57 10*3/MM3 (ref 3.4–10.8)

## 2024-03-18 PROCEDURE — C1894 INTRO/SHEATH, NON-LASER: HCPCS | Performed by: INTERNAL MEDICINE

## 2024-03-18 PROCEDURE — 80048 BASIC METABOLIC PNL TOTAL CA: CPT | Performed by: NURSE PRACTITIONER

## 2024-03-18 PROCEDURE — 63710000001 INSULIN LISPRO (HUMAN) PER 5 UNITS: Performed by: PHYSICIAN ASSISTANT

## 2024-03-18 PROCEDURE — 25510000001 IOPAMIDOL PER 1 ML: Performed by: INTERNAL MEDICINE

## 2024-03-18 PROCEDURE — 02H63JZ INSERTION OF PACEMAKER LEAD INTO RIGHT ATRIUM, PERCUTANEOUS APPROACH: ICD-10-PCS | Performed by: INTERNAL MEDICINE

## 2024-03-18 PROCEDURE — 97530 THERAPEUTIC ACTIVITIES: CPT

## 2024-03-18 PROCEDURE — 33208 INSRT HEART PM ATRIAL & VENT: CPT | Performed by: INTERNAL MEDICINE

## 2024-03-18 PROCEDURE — 82948 REAGENT STRIP/BLOOD GLUCOSE: CPT

## 2024-03-18 PROCEDURE — 25010000002 MIDAZOLAM PER 1 MG: Performed by: INTERNAL MEDICINE

## 2024-03-18 PROCEDURE — 85027 COMPLETE CBC AUTOMATED: CPT | Performed by: NURSE PRACTITIONER

## 2024-03-18 PROCEDURE — 94799 UNLISTED PULMONARY SVC/PX: CPT

## 2024-03-18 PROCEDURE — 02HK3JZ INSERTION OF PACEMAKER LEAD INTO RIGHT VENTRICLE, PERCUTANEOUS APPROACH: ICD-10-PCS | Performed by: INTERNAL MEDICINE

## 2024-03-18 PROCEDURE — 25010000002 FENTANYL CITRATE (PF) 50 MCG/ML SOLUTION: Performed by: INTERNAL MEDICINE

## 2024-03-18 PROCEDURE — 93010 ELECTROCARDIOGRAM REPORT: CPT | Performed by: INTERNAL MEDICINE

## 2024-03-18 PROCEDURE — 0JH606Z INSERTION OF PACEMAKER, DUAL CHAMBER INTO CHEST SUBCUTANEOUS TISSUE AND FASCIA, OPEN APPROACH: ICD-10-PCS | Performed by: INTERNAL MEDICINE

## 2024-03-18 PROCEDURE — 25010000002 CEFAZOLIN PER 500 MG: Performed by: INTERNAL MEDICINE

## 2024-03-18 PROCEDURE — 94761 N-INVAS EAR/PLS OXIMETRY MLT: CPT

## 2024-03-18 PROCEDURE — C1898 LEAD, PMKR, OTHER THAN TRANS: HCPCS | Performed by: INTERNAL MEDICINE

## 2024-03-18 PROCEDURE — C1785 PMKR, DUAL, RATE-RESP: HCPCS | Performed by: INTERNAL MEDICINE

## 2024-03-18 PROCEDURE — 99232 SBSQ HOSP IP/OBS MODERATE 35: CPT | Performed by: NURSE PRACTITIONER

## 2024-03-18 PROCEDURE — C1887 CATHETER, GUIDING: HCPCS | Performed by: INTERNAL MEDICINE

## 2024-03-18 PROCEDURE — 25010000002 LIDOCAINE 1 % SOLUTION: Performed by: INTERNAL MEDICINE

## 2024-03-18 PROCEDURE — 94640 AIRWAY INHALATION TREATMENT: CPT

## 2024-03-18 PROCEDURE — 93005 ELECTROCARDIOGRAM TRACING: CPT | Performed by: INTERNAL MEDICINE

## 2024-03-18 DEVICE — LD PM CAPSUREFIX NOVUS5076 52CM: Type: IMPLANTABLE DEVICE | Status: FUNCTIONAL

## 2024-03-18 DEVICE — GEN PM AZURE S SURESCAN DR MRI: Type: IMPLANTABLE DEVICE | Status: FUNCTIONAL

## 2024-03-18 DEVICE — IMPLANTABLE DEVICE: Type: IMPLANTABLE DEVICE | Status: FUNCTIONAL

## 2024-03-18 RX ORDER — ACETYLCYSTEINE 200 MG/ML
1 SOLUTION ORAL; RESPIRATORY (INHALATION)
Status: DISCONTINUED | OUTPATIENT
Start: 2024-03-18 | End: 2024-03-19 | Stop reason: HOSPADM

## 2024-03-18 RX ORDER — AMIODARONE HYDROCHLORIDE 200 MG/1
400 TABLET ORAL EVERY 12 HOURS SCHEDULED
Status: DISCONTINUED | OUTPATIENT
Start: 2024-03-18 | End: 2024-03-19

## 2024-03-18 RX ORDER — LIDOCAINE HYDROCHLORIDE 10 MG/ML
INJECTION, SOLUTION INFILTRATION; PERINEURAL
Status: DISCONTINUED | OUTPATIENT
Start: 2024-03-18 | End: 2024-03-18 | Stop reason: HOSPADM

## 2024-03-18 RX ORDER — FENTANYL CITRATE 50 UG/ML
INJECTION, SOLUTION INTRAMUSCULAR; INTRAVENOUS
Status: DISCONTINUED | OUTPATIENT
Start: 2024-03-18 | End: 2024-03-18 | Stop reason: HOSPADM

## 2024-03-18 RX ORDER — FUROSEMIDE 40 MG/1
40 TABLET ORAL
Status: DISCONTINUED | OUTPATIENT
Start: 2024-03-18 | End: 2024-03-19 | Stop reason: HOSPADM

## 2024-03-18 RX ORDER — IPRATROPIUM BROMIDE AND ALBUTEROL SULFATE 2.5; .5 MG/3ML; MG/3ML
3 SOLUTION RESPIRATORY (INHALATION) EVERY 4 HOURS PRN
Status: DISCONTINUED | OUTPATIENT
Start: 2024-03-18 | End: 2024-03-19 | Stop reason: HOSPADM

## 2024-03-18 RX ORDER — METHOHEXITAL IN WATER/PF 100MG/10ML
SYRINGE (ML) INTRAVENOUS
Status: DISCONTINUED | OUTPATIENT
Start: 2024-03-18 | End: 2024-03-18 | Stop reason: HOSPADM

## 2024-03-18 RX ORDER — MIDAZOLAM HYDROCHLORIDE 1 MG/ML
INJECTION INTRAMUSCULAR; INTRAVENOUS
Status: DISCONTINUED | OUTPATIENT
Start: 2024-03-18 | End: 2024-03-18 | Stop reason: HOSPADM

## 2024-03-18 RX ORDER — SODIUM CHLORIDE 9 MG/ML
INJECTION, SOLUTION INTRAVENOUS
Status: COMPLETED | OUTPATIENT
Start: 2024-03-18 | End: 2024-03-18

## 2024-03-18 RX ADMIN — INSULIN LISPRO 4 UNITS: 100 INJECTION, SOLUTION INTRAVENOUS; SUBCUTANEOUS at 20:55

## 2024-03-18 RX ADMIN — HYDROCODONE BITARTRATE AND ACETAMINOPHEN 2 TABLET: 5; 325 TABLET ORAL at 22:30

## 2024-03-18 RX ADMIN — FUROSEMIDE 40 MG: 40 TABLET ORAL at 09:12

## 2024-03-18 RX ADMIN — ACETYLCYSTEINE 1 ML: 200 SOLUTION ORAL; RESPIRATORY (INHALATION) at 11:10

## 2024-03-18 RX ADMIN — POLYETHYLENE GLYCOL 3350 17 G: 17 POWDER, FOR SOLUTION ORAL at 09:12

## 2024-03-18 RX ADMIN — AMIODARONE HYDROCHLORIDE 400 MG: 200 TABLET ORAL at 20:47

## 2024-03-18 RX ADMIN — POTASSIUM CHLORIDE 20 MEQ: 750 TABLET, EXTENDED RELEASE ORAL at 09:12

## 2024-03-18 RX ADMIN — FUROSEMIDE 40 MG: 40 TABLET ORAL at 17:15

## 2024-03-18 RX ADMIN — HYDROCODONE BITARTRATE AND ACETAMINOPHEN 2 TABLET: 5; 325 TABLET ORAL at 03:41

## 2024-03-18 RX ADMIN — ACETYLCYSTEINE 1 ML: 200 SOLUTION ORAL; RESPIRATORY (INHALATION) at 19:36

## 2024-03-18 RX ADMIN — ATORVASTATIN CALCIUM 40 MG: 20 TABLET, FILM COATED ORAL at 20:48

## 2024-03-18 RX ADMIN — HYDROCODONE BITARTRATE AND ACETAMINOPHEN 2 TABLET: 5; 325 TABLET ORAL at 11:45

## 2024-03-18 RX ADMIN — AMIODARONE HYDROCHLORIDE 400 MG: 200 TABLET ORAL at 09:12

## 2024-03-18 RX ADMIN — CETIRIZINE HYDROCHLORIDE 10 MG: 10 TABLET ORAL at 09:15

## 2024-03-18 RX ADMIN — MUPIROCIN 1 APPLICATION: 20 OINTMENT TOPICAL at 20:48

## 2024-03-18 RX ADMIN — PANTOPRAZOLE SODIUM 40 MG: 40 TABLET, DELAYED RELEASE ORAL at 06:35

## 2024-03-18 RX ADMIN — ASPIRIN 81 MG: 81 TABLET, COATED ORAL at 09:12

## 2024-03-18 RX ADMIN — MUPIROCIN 1 APPLICATION: 20 OINTMENT TOPICAL at 09:12

## 2024-03-18 RX ADMIN — LEVOTHYROXINE SODIUM 75 MCG: 75 TABLET ORAL at 06:34

## 2024-03-18 RX ADMIN — GUAIFENESIN 1200 MG: 600 TABLET, EXTENDED RELEASE ORAL at 09:12

## 2024-03-18 RX ADMIN — IPRATROPIUM BROMIDE AND ALBUTEROL SULFATE 3 ML: .5; 3 SOLUTION RESPIRATORY (INHALATION) at 11:04

## 2024-03-18 RX ADMIN — ALPRAZOLAM 0.25 MG: 0.25 TABLET ORAL at 22:30

## 2024-03-18 RX ADMIN — DOCUSATE SODIUM 50MG AND SENNOSIDES 8.6MG 2 TABLET: 8.6; 5 TABLET, FILM COATED ORAL at 20:47

## 2024-03-18 RX ADMIN — GUAIFENESIN 1200 MG: 600 TABLET, EXTENDED RELEASE ORAL at 20:47

## 2024-03-18 RX ADMIN — 0.12% CHLORHEXIDINE GLUCONATE 15 ML: 1.2 RINSE ORAL at 11:45

## 2024-03-18 RX ADMIN — IPRATROPIUM BROMIDE AND ALBUTEROL SULFATE 3 ML: .5; 3 SOLUTION RESPIRATORY (INHALATION) at 19:35

## 2024-03-18 RX ADMIN — HYDROCODONE BITARTRATE AND ACETAMINOPHEN 2 TABLET: 5; 325 TABLET ORAL at 17:15

## 2024-03-18 NOTE — THERAPY TREATMENT NOTE
Patient Name: Hayden Michaud  : 1965    MRN: [id number]                              Today's Date: 3/18/2024       Admit Date: 3/11/2024    Visit Dx:     ICD-10-CM ICD-9-CM   1. Aortic valve stenosis, etiology of cardiac valve disease unspecified  I35.0 424.1   2. S/P AVR (aortic valve replacement)  Z95.2 V43.3   3. Heart block AV second degree  I44.1 426.13     Patient Active Problem List   Diagnosis    Unstable angina    Aortic valve stenosis    Heart block AV second degree     Past Medical History:   Diagnosis Date    Diabetes mellitus     Heart murmur     Hyperlipidemia     Hypertension     Skin abnormalities     ble  uses a cream    Swollen tonsil     left side     Past Surgical History:   Procedure Laterality Date    AORTIC VALVE REPAIR/REPLACEMENT N/A 3/14/2024    Procedure: INTRA OP MARTIN, STERNOTOMY, AORTIC VALVE REPLACEMENT, EXCLUSION OF LEFT ATRIAL APPENDAGE, PRP;  Surgeon: Solomon West MD;  [facility]  Service: Cardiothoracic;  Laterality: N/A;    CARDIAC CATHETERIZATION N/A 3/12/2024    Procedure: Right Heart Cath;  Surgeon: Hayden Abdullahi MD;  Location: [facility] CATH INVASIVE LOCATION;  Service: Cardiovascular;  Laterality: N/A;    CARDIAC CATHETERIZATION N/A 3/12/2024    Procedure: Left Heart Cath;  Surgeon: Hayden Abdullahi MD;  Location: [facility] CATH INVASIVE LOCATION;  Service: Cardiovascular;  Laterality: N/A;    CARDIAC CATHETERIZATION N/A 3/12/2024    Procedure: Coronary angiography;  Surgeon: Hayden Abdullahi MD;  Location: [facility] CATH INVASIVE LOCATION;  Service: Cardiovascular;  Laterality: N/A;    COLONOSCOPY      ENDOSCOPY      TONSILLECTOMY Left 12/3/2019    Procedure: LEFT TONSIL BIOPSY;  Surgeon: Lebron Truong MD;  Location: [facility] OR;  Service: ENT    WISDOM TOOTH EXTRACTION        General Information       Row Name 24 1027          Physical Therapy Time and Intention    Document Type therapy note (daily note)  -MS (r) MH (t) MS (c)      Mode of Treatment individual therapy;physical therapy  -MS (r) MH (t) MS (c)       Row Name 03/18/24 1027          General Information    Patient Profile Reviewed yes  -MS (r) MH (t) MS (c)     Existing Precautions/Restrictions fall;sternal  -MS (r) MH (t) MS (c)       Row Name 03/18/24 1027          Cognition    Orientation Status (Cognition) oriented x 4  -MS (r) MH (t) MS (c)       Row Name 03/18/24 1027          Safety Issues, Functional Mobility    Safety Issues Affecting Function (Mobility) safety precautions follow-through/compliance;sequencing abilities;safety precaution awareness  -MS (r) MH (t) MS (c)     Impairments Affecting Function (Mobility) endurance/activity tolerance;postural/trunk control;shortness of breath;balance  -MS (r) MH (t) MS (c)     Comment, Safety Issues/Impairments (Mobility) Nonskid socks donned  -MS (r) MH (t) MS (c)               User Key  (r) = Recorded By, (t) = Taken By, (c) = Cosigned By      Initials Name Provider Type    Shannon Grimes, PT Physical Therapist     Snow Nick, PT Student PT Student                   Mobility       Row Name 03/18/24 1027          Bed Mobility    Supine-Sit [place] (Bed Mobility) verbal cues;nonverbal cues (demo/gesture);contact guard  -MS (r) MH (t) MS (c)     Sit-Supine [place] (Bed Mobility) not tested  -MS (r) MH (t) MS (c)     Comment, (Bed Mobility) Pt needed increased time for transfers this date.  -MS (r) MH (t) MS (c)       Row Name 03/18/24 1027          Bed-Chair Transfer    Bed-Chair [place] (Transfers) verbal cues;nonverbal cues (demo/gesture);contact guard  -MS (r) MH (t) MS (c)       Row Name 03/18/24 1027          Sit-Stand Transfer    Sit-Stand [place] (Transfers) verbal cues;nonverbal cues (demo/gesture);contact guard  -MS (r) MH (t) MS (c)     Comment, (Sit-Stand Transfer) No AD used this date. No overt LOB.  -MS (r) MH (t) MS (c)       Row Name 03/18/24 1027          Gait/Stairs (Locomotion)     [place] Level (Gait) verbal cues;contact guard  -MS (r) MH (t) MS (c)     Patient was able to Ambulate yes  -MS (r) MH (t) MS (c)     Distance in Feet (Gait) 220  -MS (r) MH (t) MS (c)     Deviations/Abnormal Patterns (Gait) otis decreased;gait speed decreased  -MS (r) MH (t) MS (c)     [place] Level (Stairs) not tested  -MS (r) MH (t) MS (c)     Comment, (Gait/Stairs) Pt ambulated with no AD this date. No LOB or veering noted, pt needed standing rest breaks PRN.  -MS (r) MH (t) MS (c)               User Key  (r) = Recorded By, (t) = Taken By, (c) = Cosigned By      Initials Name Provider Type    MS ValenzuelasShannon, PT Physical Therapist     Snow Nick, PT Student PT Student                   Obj/Interventions       Row Name 03/18/24 1030          Motor Skills    Therapeutic Exercise --  cardiac protocol x 10, 1 STS  -MS (r) MH (t) MS (c)       Row Name 03/18/24 1030          Balance    Balance Assessment sitting static balance;sit to stand dynamic balance;standing static balance;standing dynamic balance  -MS (r) MH (t) MS (c)     Static Sitting Balance verbal cues;non-verbal cues (demo/gesture);standby assist  -MS (r) MH (t) MS (c)     Position, Sitting Balance unsupported;sitting in chair;sitting edge of bed  -MS (r) MH (t) MS (c)     Sit to Stand Dynamic Balance verbal cues;non-verbal cues (demo/gesture);contact guard  -MS (r) MH (t) MS (c)     Static Standing Balance verbal cues;non-verbal cues (demo/gesture);contact guard  -MS (r) MH (t) MS (c)     Dynamic Standing Balance verbal cues;non-verbal cues (demo/gesture);contact guard  -MS (r) MH (t) MS (c)     Balance Interventions standing;sitting;sit to stand;static;dynamic  -MS (r) MH (t) MS (c)     Comment, Balance No LOB or veering noted.  -MS (r) MH (t) MS (c)               User Key  (r) = Recorded By, (t) = Taken By, (c) = Cosigned By      Initials Name Provider Type    Shannon Grimes, PT Physical Therapist    RADHA Nick,  Snow, PT Student PT Student                   Goals/Plan    No documentation.                  Clinical Impression       Row Name 03/18/24 1031          Pain    Pre/Posttreatment Pain Comment No pain reported this date.  -MS (r) MH (t) MS (c)       Row Name 03/18/24 1031          Plan of Care Review    Plan of Care Reviewed With patient  -MS (r) MH (t) MS (c)     Progress improving  -MS (r) MH (t) MS (c)     Outcome Evaluation Pt agreeable to PT session this date. Pt required CGA for supine>sit transfer and performed cardiac protocol exercises sitting edge of bed with SBA. Pt then required CGA for STS transfer and during ambulation. Pt was able to ambulate 220 ft this date with standing rest breaks PRN. Pt educated on PLB technique and energy conservation strategy. No overt LOB or veering noted this date. Pt transferred to chair with CGA and mod VCs for sequencing. PT will follow along peripherally as pt progresses. Pt was left in reclined in chair with call light in reach.  -MS (r) MH (t) MS (c)       Row Name 03/18/24 1031          Therapy Assessment/Plan (PT)    Therapy Frequency (PT) 5 times/wk  -MS (r) MH (t) MS (c)       Row Name 03/18/24 1031          Vital Signs    Pretreatment Heart Rate (beats/min) 87  -MS (r) MH (t) MS (c)     Pre SpO2 (%) 98  -MS (r) MH (t) MS (c)     O2 Delivery Pre Treatment room air  -MS (r) MH (t) MS (c)     O2 Delivery Intra Treatment room air  -MS (r) MH (t) MS (c)     O2 Delivery Post Treatment room air  -MS (r) MH (t) MS (c)     Pre Patient Position Supine  -MS (r) MH (t) MS (c)     Intra Patient Position Standing  -MS (r) MH (t) MS (c)     Post Patient Position Sitting  -MS (r) MH (t) MS (c)     Rest Breaks  2  -MS (r) MH (t) MS (c)       Row Name 03/18/24 1031          Positioning and Restraints    Pre-Treatment Position in bed  -MS (r) MH (t) MS (c)     Post Treatment Position chair  -MS (r) MH (t) MS (c)     In Chair notified nsg;reclined;call light within  reach;encouraged to call for assist;exit alarm on  -MS (r) MH (t) MS (c)               User Key  (r) = Recorded By, (t) = Taken By, (c) = Cosigned By      Initials Name Provider Type    MS XieShannon, PT Physical Therapist    Snow Fraire, PT Student PT Student                   Outcome Measures       Row Name 03/18/24 1035 03/18/24 0919       How much help from another person do you currently need...    Turning from your back to your side while in flat bed without using bedrails? 4  -MS (r) MH (t) MS (c) 4  -AA    Moving from lying on back to sitting on the side of a flat bed without bedrails? 4  -MS (r) MH (t) MS (c) 4  -AA    Moving to and from a bed to a chair (including a wheelchair)? 4  -MS (r) MH (t) MS (c) 3  -AA    Standing up from a chair using your arms (e.g., wheelchair, bedside chair)? 4  -MS (r) MH (t) MS (c) 3  -AA    Climbing 3-5 steps with a railing? 3  -MS (r) MH (t) MS (c) 3  -AA    To walk in hospital room? 4  -MS (r) MH (t) MS (c) 3  -AA    AM-PAC 6 Clicks Score (PT) 23  -MS (r) MH (t) 20  -AA    Highest Level of Mobility Goal 7 --> Walk 25 feet or more  -MS (r) MH (t) 6 --> Walk 10 steps or more  -AA      Row Name 03/18/24 1035          Functional Assessment    Outcome Measure Options AM-PAC 6 Clicks Basic Mobility (PT)  -MS (r) MH (t) MS (c)               User Key  (r) = Recorded By, (t) = Taken By, (c) = Cosigned By      Initials Name Provider Type    MS XieShannon, PT Physical Therapist    Snow Fraire, PT Student PT Student    Young Ochoa RN Registered Nurse                                 Physical Therapy Education       Title: PT OT SLP Therapies (Done)       Topic: Physical Therapy (Done)       Point: Mobility training (Done)       Learning Progress Summary             Patient Acceptance, E,TB, VU by  at 3/18/2024 1035    Acceptance, E, VU,NR by CIRILO at 3/17/2024 1147    Acceptance, E, NR by AR at 3/16/2024 1637    Acceptance, E,TB, VU by  at  3/15/2024 0928                         Point: Home exercise program (Done)       Learning Progress Summary             Patient Acceptance, E,TB, VU by  at 3/18/2024 1035    Acceptance, E, VU,NR by  at 3/17/2024 1147    Acceptance, E, NR by AR at 3/16/2024 1637    Acceptance, E,TB, VU by  at 3/15/2024 0928                         Point: Body mechanics (Done)       Learning Progress Summary             Patient Acceptance, E,TB, VU by  at 3/18/2024 1035    Acceptance, E, VU,NR by DJ at 3/17/2024 1147    Acceptance, E, NR by AR at 3/16/2024 1637    Acceptance, E,TB, VU by  at 3/15/2024 0928                         Point: Precautions (Done)       Learning Progress Summary             Patient Acceptance, E,TB, VU by  at 3/18/2024 1035    Acceptance, E, VU,NR by  at 3/17/2024 1147    Acceptance, E, NR by AR at 3/16/2024 1637    Acceptance, E,TB, VU by  at 3/15/2024 0928                                         User Key       Initials Effective Dates Name Provider Type Discipline    AR 06/16/21 -  Ledy Ruvalcaba, PT Physical Therapist PT     10/25/19 -  Alison Flanagan, PT Physical Therapist PT     01/24/24 -  Snow Nick PT Student PT Student PT                  PT Recommendation and Plan     Plan of Care Reviewed With: patient  Progress: improving  Outcome Evaluation: Pt agreeable to PT session this date. Pt required CGA for supine>sit transfer and performed cardiac protocol exercises sitting edge of bed with SBA. Pt then required CGA for STS transfer and during ambulation. Pt was able to ambulate 220 ft this date with standing rest breaks PRN. Pt educated on PLB technique and energy conservation strategy. No overt LOB or veering noted this date. Pt transferred to chair with CGA and mod VCs for sequencing. PT will follow along peripherally as pt progresses. Pt was left in reclined in chair with call light in reach.     Time Calculation:         PT Charges       Row Name 03/18/24 1036              Time Calculation    Start Time 0919  -MS (r) MH (t) MS (c)      Stop Time 0938  -MS (r) MH (t) MS (c)      Time Calculation (min) 19 min  -MS (r) MH (t)      PT Received On 03/18/24  -MS (r) MH (t) MS (c)      PT - Next Appointment 03/19/24  -MS (r) MH (t) MS (c)         Time Calculation- PT    Total Timed Code Minutes- PT 19 minute(s)  -MS (r) MH (t) MS (c)         Timed Charges    73291 - PT Therapeutic Exercise Minutes 6  -MS (r) MH (t) MS (c)      59209 - PT Therapeutic Activity Minutes 13  -MS (r) MH (t) MS (c)         Total Minutes    Timed Charges Total Minutes 19  -MS (r) MH (t)       Total Minutes 19  -MS (r) MH (t)                User Key  (r) = Recorded By, (t) = Taken By, (c) = Cosigned By      Initials Name Provider Type    MS XieShannon, PT Physical Therapist    Snow Fraire, PT Student PT Student                  Therapy Charges for Today       Code Description Service Date Service Provider Modifiers Qty    43124234587 HC PT THERAPEUTIC ACT EA 15 MIN 3/18/2024 Snow Nick, PT Student GP 1            PT G-Codes  Outcome Measure Options: AM-PAC 6 Clicks Basic Mobility (PT)  AM-PAC 6 Clicks Score (PT): 23  PT Discharge Summary  Anticipated Discharge Disposition (PT): home with assist, home    Snow Nick PT Student  3/18/2024

## 2024-03-18 NOTE — PLAN OF CARE
Goal Outcome Evaluation:         Patient pain controlled by pain med, vital signs stable, SR with 1st degree HB, NPO for pacemaker insertion  in the morning, up standby assist, uses  c-pap at night cont to monitor.

## 2024-03-18 NOTE — NURSING NOTE
Patient convert to Afib heat rate 68, vital signs stable, AP RN notified no new order cont to monitor.

## 2024-03-18 NOTE — PROGRESS NOTES
LOS: 7 days   Patient Care Team:  Carol Vanessa MD as PCP - General (General Practice)  Savanah Mitchell MD as Consulting Physician (Radiation Oncology)  Lebron Truong MD as Consulting Physician (Otolaryngology)      Chief Complaint: Follow up aortic stenosis, afib       Interval History: No complaints. Awaiting procedure today.      Objective   Vital Signs  Temp:  [98.1 °F (36.7 °C)-99.3 °F (37.4 °C)] 98.1 °F (36.7 °C)  Heart Rate:  [72-96] 72  Resp:  [20] 20  BP: (141-153)/(84-92) 153/92    Intake/Output Summary (Last 24 hours) at 3/18/2024 0927  Last data filed at 3/17/2024 1720  Gross per 24 hour   Intake 960 ml   Output 475 ml   Net 485 ml         Physical Exam:  Constitutional: Well appearing, well developed, no acute distress   HENT: Oropharynx clear and membrane moist  Eyes: Normal conjunctiva, no sclera icterus.  Neck: Supple, no carotid bruit bilaterally.  Cardiovascular: Irregularly irregular rate and rhythm, No Murmur, No bilateral lower extremity edema.  Pulmonary: Normal respiratory effort, normal lung sounds, no wheezing.  Abdominal: Soft, nontender, no hepatosplenomegaly, liver is non-pulsatile.  Neurological: Alert and orient x 3.   Skin: Warm, dry, no ecchymosis, no rash.  Psych: Appropriate mood and affect. Normal judgment and insight.      Results Review:      Results from last 7 days   Lab Units 03/18/24  0313 03/17/24  1111 03/17/24  0414 03/16/24  0309   SODIUM mmol/L 138  --  138 139   POTASSIUM mmol/L 4.1 4.0 3.8 4.2   CHLORIDE mmol/L 103  --  105 107   CO2 mmol/L 22.6  --  21.0* 20.7*   BUN mg/dL 23*  --  22* 20   CREATININE mg/dL 0.97  --  0.99 1.12   GLUCOSE mg/dL 142*  --  143* 149*   CALCIUM mg/dL 8.5*  --  8.7 8.3*     Results from last 7 days   Lab Units 03/11/24 2007 03/11/24  1739   HSTROP T ng/L 19 19     Results from last 7 days   Lab Units 03/18/24  0313 03/17/24  0414 03/16/24  0309   WBC 10*3/mm3 9.57 13.32* 12.87*   HEMOGLOBIN g/dL 10.7* 11.3* 11.2*   HEMATOCRIT % 32.3*  32.8* 33.2*   PLATELETS 10*3/mm3 237 193 172     Results from last 7 days   Lab Units 03/15/24  0311 03/14/24  1106 03/13/24  1740   INR  1.34* 1.32* 1.13*   APTT seconds  --  24.8 28.2     Results from last 7 days   Lab Units 03/13/24  1740   CHOLESTEROL mg/dL 122     Results from last 7 days   Lab Units 03/15/24  0311   MAGNESIUM mg/dL 2.4     Results from last 7 days   Lab Units 03/13/24  1740   CHOLESTEROL mg/dL 122   TRIGLYCERIDES mg/dL 124   HDL CHOL mg/dL 27*   LDL CHOL mg/dL 72       I reviewed the patient's new clinical results.  I personally viewed and interpreted the patient's EKG/Telemetry data        Medication Review:   acetylcysteine, 1 mL, Nebulization, TID - RT  amiodarone, 400 mg, Oral, Q12H  aspirin, 81 mg, Oral, Daily  atorvastatin, 40 mg, Oral, Nightly  cetirizine, 10 mg, Oral, Daily  chlorhexidine, 15 mL, Mouth/Throat, Q12H  furosemide, 40 mg, Oral, BID  guaiFENesin, 1,200 mg, Oral, Q12H  insulin lispro, 2-9 Units, Subcutaneous, 4x Daily AC & at Bedtime  levothyroxine, 75 mcg, Oral, Q AM  mupirocin, , Each Nare, BID  pantoprazole, 40 mg, Oral, QAM  polyethylene glycol, 17 g, Oral, Daily  potassium chloride, 20 mEq, Oral, Daily  senna-docusate sodium, 2 tablet, Oral, Nightly        sodium chloride, 30 mL/hr, Last Rate: 30 mL/hr (03/14/24 1214)        Assessment & Plan       Unstable angina    Aortic valve stenosis      Aortic stenosis/aortic insufficiency.  Status post AVR on 3/14.  Paroxysmal atrial fibrillation.  Discussed with Dr. Alvarez.  Will start amiodarone 400 mg BID.  No beta-blockade given heart block. Would recommend AC following procedure today and once okay from surgical standpoint.  Heart block.  Preoperatively he was in [place] type II second-degree heart block/complete heart block.  Postoperatively he has had a significantly prolonged NE interval.  Plans for permanent pacemaker placement today.      COURTNEY Boyer  03/18/24  09:27 EDT

## 2024-03-18 NOTE — PROGRESS NOTES
Saw patient this morning, he is NPO for pacemaker placement this afternoon.     Had some periods of AF over the weekend. But when in NSR he has very prolonged IL interval.     He has periods of CHB prior to surgery. Will go ahead and proceed with permanent pacemaker placement this afternoon.     Discussed risks, benefits, and alternatives.

## 2024-03-18 NOTE — PROGRESS NOTES
" LOS: 7 days   Patient Care Team:  Carol Vanessa MD as PCP - General (General Practice)  Savanah Mitchell MD as Consulting Physician (Radiation Oncology)  Lebron Truong MD as Consulting Physician (Otolaryngology)    Chief Complaint: post op    Subjective  Complains of having difficulty coughing up thick secretions.     Vital Signs  Temp:  [98.1 °F (36.7 °C)-98.8 °F (37.1 °C)] 98.1 °F (36.7 °C)  Heart Rate:  [72-93] 93  Resp:  [18-27] 27  BP: (141-153)/(84-92) 153/92  Body mass index is 45.51 kg/m².    Intake/Output Summary (Last 24 hours) at 3/18/2024 1344  Last data filed at 3/18/2024 0919  Gross per 24 hour   Intake 240 ml   Output --   Net 240 ml     No intake/output data recorded.          03/15/24  0600 03/16/24  0700 03/18/24  0454   Weight: 136 kg (299 lb 13.2 oz) 134 kg (295 lb) 136 kg (299 lb 4.8 oz)     Objective:  General Appearance:  Comfortable and in no acute distress.    Vital signs: (most recent): Blood pressure 153/92, pulse 93, temperature 98.1 °F (36.7 °C), temperature source Oral, resp. rate 27, height 172.7 cm (68\"), weight 136 kg (299 lb 4.8 oz), SpO2 99%.  Vital signs are normal.  No fever.    Output: Producing urine and no stool output (+flatus).    Lungs:  Normal effort and normal respiratory rate.  There are decreased breath sounds (bases bilaterally).    Heart: Normal rate.  Regular rhythm.  (SR with 1st degree AVB)  Abdomen: Abdomen is soft.  Hypoactive bowel sounds.     Extremities: There is dependent edema.    Pulses: Distal pulses are intact.    Neurological: Patient is alert and oriented to person, place and time.    Skin:  Warm and dry.  (Sternal incision healing well without erythema or drainage)          Results Review:        WBC WBC   Date Value Ref Range Status   03/18/2024 9.57 3.40 - 10.80 10*3/mm3 Final   03/17/2024 13.32 (H) 3.40 - 10.80 10*3/mm3 Final   03/16/2024 12.87 (H) 3.40 - 10.80 10*3/mm3 Final      HGB Hemoglobin   Date Value Ref Range Status   03/18/2024 10.7 " "(L) 13.0 - 17.7 g/dL Final   03/17/2024 11.3 (L) 13.0 - 17.7 g/dL Final   03/16/2024 11.2 (L) 13.0 - 17.7 g/dL Final      HCT Hematocrit   Date Value Ref Range Status   03/18/2024 32.3 (L) 37.5 - 51.0 % Final   03/17/2024 32.8 (L) 37.5 - 51.0 % Final   03/16/2024 33.2 (L) 37.5 - 51.0 % Final      Platelets Platelets   Date Value Ref Range Status   03/18/2024 237 140 - 450 10*3/mm3 Final   03/17/2024 193 140 - 450 10*3/mm3 Final   03/16/2024 172 140 - 450 10*3/mm3 Final        PT/INR:    No results found for: \"PROTIME\"  /  No results found for: \"INR\"      Sodium Sodium   Date Value Ref Range Status   03/18/2024 138 136 - 145 mmol/L Final   03/17/2024 138 136 - 145 mmol/L Final   03/16/2024 139 136 - 145 mmol/L Final      Potassium Potassium   Date Value Ref Range Status   03/18/2024 4.1 3.5 - 5.2 mmol/L Final   03/17/2024 4.0 3.5 - 5.2 mmol/L Final   03/17/2024 3.8 3.5 - 5.2 mmol/L Final   03/16/2024 4.2 3.5 - 5.2 mmol/L Final      Chloride Chloride   Date Value Ref Range Status   03/18/2024 103 98 - 107 mmol/L Final   03/17/2024 105 98 - 107 mmol/L Final   03/16/2024 107 98 - 107 mmol/L Final      Bicarbonate CO2   Date Value Ref Range Status   03/18/2024 22.6 22.0 - 29.0 mmol/L Final   03/17/2024 21.0 (L) 22.0 - 29.0 mmol/L Final   03/16/2024 20.7 (L) 22.0 - 29.0 mmol/L Final      BUN BUN   Date Value Ref Range Status   03/18/2024 23 (H) 6 - 20 mg/dL Final   03/17/2024 22 (H) 6 - 20 mg/dL Final   03/16/2024 20 6 - 20 mg/dL Final      Creatinine Creatinine   Date Value Ref Range Status   03/18/2024 0.97 0.76 - 1.27 mg/dL Final   03/17/2024 0.99 0.76 - 1.27 mg/dL Final   03/16/2024 1.12 0.76 - 1.27 mg/dL Final      Calcium Calcium   Date Value Ref Range Status   03/18/2024 8.5 (L) 8.6 - 10.5 mg/dL Final   03/17/2024 8.7 8.6 - 10.5 mg/dL Final   03/16/2024 8.3 (L) 8.6 - 10.5 mg/dL Final      Magnesium No results found for: \"MG\"         [Transfer Hold] acetylcysteine, 1 mL, Nebulization, TID - RT  amiodarone, 400 mg, " Oral, Q12H  aspirin, 81 mg, Oral, Daily  atorvastatin, 40 mg, Oral, Nightly  [Transfer Hold] cetirizine, 10 mg, Oral, Daily  chlorhexidine, 15 mL, Mouth/Throat, Q12H  [Transfer Hold] furosemide, 40 mg, Oral, BID  [Transfer Hold] guaiFENesin, 1,200 mg, Oral, Q12H  [Transfer Hold] insulin lispro, 2-9 Units, Subcutaneous, 4x Daily AC & at Bedtime  [Transfer Hold] levothyroxine, 75 mcg, Oral, Q AM  mupirocin, , Each Nare, BID  pantoprazole, 40 mg, Oral, QAM  [Transfer Hold] polyethylene glycol, 17 g, Oral, Daily  potassium chloride, 20 mEq, Oral, Daily  senna-docusate sodium, 2 tablet, Oral, Nightly      ceFAZolin,   sodium chloride, 30 mL/hr, Last Rate: 30 mL/hr (03/14/24 1214)  sodium chloride, , Last Rate: 30 mL/hr (03/18/24 1245)      Assessment & Plan    - Severe aortic valve stenosis s/p AVR;PARISH closure POD#4 [place]  - Hypertension  - DM II -- A1c 7.3  - Hyperlipidemia  - DAVID with home CPAP  - Hx tonsillar cancer (treated with resection and chemotherapy -- 2020)  - Intermittent 2nd/3rd degree heart block--beta blocker on hold, EP following   - leukocytosis--reactive  - post op anemia--expected acute blood loss       POD #4. Doing well. Remains in sinus rhythm with 1st degree AV block. OH interval is very long. Had episodes of atrial fibrillation over the weekend, but converted back to sinus rhythm without intervention. Beta block remains on hold. EP planning for PPM today. Keeping temporary pacing wires for now. Weight still up and peripheral edema, so will increase Lasix to twice daily. Continue Mucinex. Add Mucomyst nebs with bronchodilators and flutter valve. Encourage ambulation. Hopefully home tomorrow.       RADHA Machuca  03/18/24  13:44 EDT

## 2024-03-18 NOTE — CONSULTS
Met with patient to discuss the benefits of cardiac rehab. Provided phase II information along with the contact information for cardiac rehab at [facility]. Pt requested for referral to be sent. Explained if receiving home health would not be able to attend cardiac rehab until finished with home health.

## 2024-03-18 NOTE — CASE MANAGEMENT/SOCIAL WORK
Discharge Planning Assessment  [facility]     Patient Name: Hayden Michaud  MRN: [id number]  Today's Date: 3/18/2024    Admit Date: 3/11/2024    Plan: To mother's home w/ Andrew MEJÍA   Discharge Needs Assessment       Row Name 03/18/24 1540       Living Environment    People in Home spouse    Name(s) of People in Home Jesika Michaud, spouse    Current Living Arrangements home    Potentially Unsafe Housing Conditions none    In the past 12 months has the electric, gas, oil, or water company threatened to shut off services in your home? No    Primary Care Provided by self    Provides Primary Care For pet(s)  1 DOG & 1 CAT    Caregiving Concerns CCP made SPOUSE aware of the risk of pets and germs with fresh incision site.    Family Caregiver if Needed spouse;parent(s)    Family Caregiver Names Lilia/wife & Inga/mother    Quality of Family Relationships helpful;involved;supportive    Able to Return to Prior Arrangements yes       Resource/Environmental Concerns    Resource/Environmental Concerns home accessibility    Home Accessibility Concerns stairs to access bedroom or bathroom    Transportation Concerns none       Transportation Needs    In the past 12 months, has lack of transportation kept you from medical appointments or from getting medications? no    In the past 12 months, has lack of transportation kept you from meetings, work, or from getting things needed for daily living? No       Food Insecurity    Within the past 12 months, you worried that your food would run out before you got the money to buy more. Never true       Transition Planning    Patient/Family Anticipates Transition to family members' home    Patient/Family Anticipated Services at Transition home health care    Transportation Anticipated family or friend will provide       Discharge Needs Assessment    Readmission Within the Last 30 Days no previous admission in last 30 days    Equipment Currently Used at Home cpap;bp cuff;scales     Concerns to be Addressed discharge planning    Anticipated Changes Related to Illness none    Equipment Needed After Discharge none    Provided Post Acute Provider List? Yes    Post Acute Provider List Home Health    Patient's [facility] of Community Agency([facility]) [facility]                   Discharge Plan       Row Name 03/18/24 1547       Plan    Plan To mother's home w/ [facility]     Plan Comments CCP spoke with patient spouse/Lilia Michaud and his mother/Inga Cui, at bedside.  Pt off unit for procedure.  CCP role explained and discharge planning discussed.  Face sheet verified.  Patient is IADL's, works full-time remotely and drives.  Patient lives with spouse in a single-story home with walk-out basement and has a flight of stair steps to enter residence.  Patients PCP confirmed as, Carol Vanessa MD.  Patient's pharmacy confirmed as, [address]  Patient has not used past home health or going to a sub-acute rehab.  Patient has the following DME-CPAP, BP cuff and scale.  Patient plans to go to his mother Inga's home at d/c ([address] [place] [address]).  [facility]  chosen and accepted.  CCP will continue to follow…….Krissy BOATENG. /GINGER.                  Continued Care and Services - Admitted Since 3/11/2024       Home Medical Care Coordination complete.      Service Provider Request Status Selected Services Address Phone Fax Patient Preferred    [facility]  Selected Home Health Services [address] [address] [phone] --                  Expected Discharge Date and Time       Expected Discharge Date Expected Discharge Time    Mar 20, 2024            Demographic Summary       Row Name 03/18/24 1540       General Information    Admission Type inpatient    Arrived From home    Referral Source admission list;physician    Reason for Consult discharge planning    Preferred Language English       Contact Information    Permission Granted to Share Info With  family/designee                   Functional Status       Row Name 03/18/24 1540       Functional Status    Usual Activity Tolerance good    Current Activity Tolerance moderate       Assessment of Health Literacy    How often do you have someone help you read hospital materials? Never    Health Literacy Good       Functional Status, IADL    Medications independent    Meal Preparation independent    Housekeeping independent    Laundry independent    Shopping independent                   Psychosocial    No documentation.                  Abuse/Neglect    No documentation.                  Legal    No documentation.                  Substance Abuse    No documentation.                  Patient Forms    No documentation.                     Krissy Tom RN

## 2024-03-18 NOTE — PLAN OF CARE
Goal Outcome Evaluation:  Plan of Care Reviewed With: patient        Progress: improving  Outcome Evaluation: Pt agreeable to PT session this date. Pt required CGA for supine>sit transfer and performed cardiac protocol exercises sitting edge of bed with SBA. Pt then required CGA for STS transfer and during ambulation. Pt was able to ambulate 220 ft this date with standing rest breaks PRN. Pt educated on PLB technique and energy conservation strategy. No overt LOB or veering noted this date. Pt transferred to chair with CGA and mod VCs for sequencing. PT will follow along peripherally as pt progresses. Pt was left in reclined in chair with call light in reach.      Anticipated Discharge Disposition (PT): home with assist, home

## 2024-03-19 ENCOUNTER — HOME HEALTH ADMISSION (OUTPATIENT)
Dept: HOME HEALTH SERVICES | Facility: HOME HEALTHCARE | Age: 59
End: 2024-03-19
Payer: COMMERCIAL

## 2024-03-19 ENCOUNTER — DOCUMENTATION (OUTPATIENT)
Dept: HOME HEALTH SERVICES | Facility: HOME HEALTHCARE | Age: 59
End: 2024-03-19
Payer: COMMERCIAL

## 2024-03-19 ENCOUNTER — READMISSION MANAGEMENT (OUTPATIENT)
Dept: CALL CENTER | Facility: HOSPITAL | Age: 59
End: 2024-03-19
Payer: COMMERCIAL

## 2024-03-19 VITALS
HEIGHT: 68 IN | DIASTOLIC BLOOD PRESSURE: 73 MMHG | TEMPERATURE: 98.8 F | OXYGEN SATURATION: 96 % | HEART RATE: 80 BPM | BODY MASS INDEX: 44.88 KG/M2 | RESPIRATION RATE: 18 BRPM | WEIGHT: 296.1 LBS | SYSTOLIC BLOOD PRESSURE: 132 MMHG

## 2024-03-19 LAB
ANION GAP SERPL CALCULATED.3IONS-SCNC: 14.9 MMOL/L (ref 5–15)
BUN SERPL-MCNC: 22 MG/DL (ref 6–20)
BUN/CREAT SERPL: 23.9 (ref 7–25)
CALCIUM SPEC-SCNC: 8.6 MG/DL (ref 8.6–10.5)
CHLORIDE SERPL-SCNC: 100 MMOL/L (ref 98–107)
CO2 SERPL-SCNC: 21.1 MMOL/L (ref 22–29)
CREAT SERPL-MCNC: 0.92 MG/DL (ref 0.76–1.27)
EGFRCR SERPLBLD CKD-EPI 2021: 97 ML/MIN/1.73
GLUCOSE BLDC GLUCOMTR-MCNC: 150 MG/DL (ref 70–130)
GLUCOSE BLDC GLUCOMTR-MCNC: 195 MG/DL (ref 70–130)
GLUCOSE SERPL-MCNC: 143 MG/DL (ref 65–99)
POTASSIUM SERPL-SCNC: 3.9 MMOL/L (ref 3.5–5.2)
QT INTERVAL: 440 MS
QTC INTERVAL: 499 MS
SODIUM SERPL-SCNC: 136 MMOL/L (ref 136–145)

## 2024-03-19 PROCEDURE — 94760 N-INVAS EAR/PLS OXIMETRY 1: CPT

## 2024-03-19 PROCEDURE — 99024 POSTOP FOLLOW-UP VISIT: CPT

## 2024-03-19 PROCEDURE — 82948 REAGENT STRIP/BLOOD GLUCOSE: CPT

## 2024-03-19 PROCEDURE — 94799 UNLISTED PULMONARY SVC/PX: CPT

## 2024-03-19 PROCEDURE — 94664 DEMO&/EVAL PT USE INHALER: CPT

## 2024-03-19 PROCEDURE — 94761 N-INVAS EAR/PLS OXIMETRY MLT: CPT

## 2024-03-19 PROCEDURE — 99238 HOSP IP/OBS DSCHRG MGMT 30/<: CPT | Performed by: NURSE PRACTITIONER

## 2024-03-19 PROCEDURE — 97530 THERAPEUTIC ACTIVITIES: CPT

## 2024-03-19 PROCEDURE — 80048 BASIC METABOLIC PNL TOTAL CA: CPT | Performed by: PHYSICIAN ASSISTANT

## 2024-03-19 PROCEDURE — 93005 ELECTROCARDIOGRAM TRACING: CPT | Performed by: INTERNAL MEDICINE

## 2024-03-19 PROCEDURE — 63710000001 INSULIN LISPRO (HUMAN) PER 5 UNITS: Performed by: PHYSICIAN ASSISTANT

## 2024-03-19 PROCEDURE — 93010 ELECTROCARDIOGRAM REPORT: CPT | Performed by: INTERNAL MEDICINE

## 2024-03-19 RX ORDER — ATENOLOL 25 MG/1
25 TABLET ORAL
Qty: 30 TABLET | Refills: 0 | Status: SHIPPED | OUTPATIENT
Start: 2024-03-19

## 2024-03-19 RX ORDER — POTASSIUM CHLORIDE 20 MEQ/1
20 TABLET, EXTENDED RELEASE ORAL DAILY
Qty: 30 TABLET | Refills: 0 | Status: SHIPPED | OUTPATIENT
Start: 2024-03-19

## 2024-03-19 RX ORDER — FUROSEMIDE 40 MG/1
40 TABLET ORAL DAILY
Qty: 30 TABLET | Refills: 0 | Status: SHIPPED | OUTPATIENT
Start: 2024-03-19

## 2024-03-19 RX ORDER — ASPIRIN 81 MG/1
81 TABLET ORAL DAILY
Qty: 30 TABLET | Refills: 0 | Status: SHIPPED | OUTPATIENT
Start: 2024-03-19

## 2024-03-19 RX ORDER — AMIODARONE HYDROCHLORIDE 200 MG/1
200 TABLET ORAL EVERY 12 HOURS SCHEDULED
Status: DISCONTINUED | OUTPATIENT
Start: 2024-03-19 | End: 2024-03-19 | Stop reason: HOSPADM

## 2024-03-19 RX ORDER — ATENOLOL 25 MG/1
25 TABLET ORAL
Status: DISCONTINUED | OUTPATIENT
Start: 2024-03-19 | End: 2024-03-19 | Stop reason: HOSPADM

## 2024-03-19 RX ORDER — AMIODARONE HYDROCHLORIDE 200 MG/1
200 TABLET ORAL EVERY 12 HOURS SCHEDULED
Qty: 30 TABLET | Refills: 0 | Status: SHIPPED | OUTPATIENT
Start: 2024-03-19

## 2024-03-19 RX ORDER — HYDROCODONE BITARTRATE AND ACETAMINOPHEN 5; 325 MG/1; MG/1
1 TABLET ORAL EVERY 4 HOURS PRN
Qty: 42 TABLET | Refills: 0 | Status: SHIPPED | OUTPATIENT
Start: 2024-03-19 | End: 2024-03-26

## 2024-03-19 RX ADMIN — INSULIN LISPRO 2 UNITS: 100 INJECTION, SOLUTION INTRAVENOUS; SUBCUTANEOUS at 11:22

## 2024-03-19 RX ADMIN — INSULIN LISPRO 2 UNITS: 100 INJECTION, SOLUTION INTRAVENOUS; SUBCUTANEOUS at 07:31

## 2024-03-19 RX ADMIN — LEVOTHYROXINE SODIUM 75 MCG: 75 TABLET ORAL at 06:06

## 2024-03-19 RX ADMIN — 0.12% CHLORHEXIDINE GLUCONATE 15 ML: 1.2 RINSE ORAL at 11:22

## 2024-03-19 RX ADMIN — AMIODARONE HYDROCHLORIDE 200 MG: 200 TABLET ORAL at 08:54

## 2024-03-19 RX ADMIN — MUPIROCIN 1 APPLICATION: 20 OINTMENT TOPICAL at 08:54

## 2024-03-19 RX ADMIN — ASPIRIN 81 MG: 81 TABLET, COATED ORAL at 08:54

## 2024-03-19 RX ADMIN — ACETYLCYSTEINE 1 ML: 200 SOLUTION ORAL; RESPIRATORY (INHALATION) at 07:53

## 2024-03-19 RX ADMIN — FUROSEMIDE 40 MG: 40 TABLET ORAL at 08:53

## 2024-03-19 RX ADMIN — PANTOPRAZOLE SODIUM 40 MG: 40 TABLET, DELAYED RELEASE ORAL at 06:06

## 2024-03-19 RX ADMIN — POTASSIUM CHLORIDE 20 MEQ: 750 TABLET, EXTENDED RELEASE ORAL at 08:54

## 2024-03-19 RX ADMIN — INSULIN LISPRO 2 UNITS: 100 INJECTION, SOLUTION INTRAVENOUS; SUBCUTANEOUS at 06:10

## 2024-03-19 RX ADMIN — ATENOLOL 25 MG: 25 TABLET ORAL at 10:59

## 2024-03-19 RX ADMIN — HYDROCODONE BITARTRATE AND ACETAMINOPHEN 2 TABLET: 5; 325 TABLET ORAL at 06:10

## 2024-03-19 RX ADMIN — IPRATROPIUM BROMIDE AND ALBUTEROL SULFATE 3 ML: .5; 3 SOLUTION RESPIRATORY (INHALATION) at 07:52

## 2024-03-19 RX ADMIN — CETIRIZINE HYDROCHLORIDE 10 MG: 10 TABLET ORAL at 08:53

## 2024-03-19 RX ADMIN — GUAIFENESIN 1200 MG: 600 TABLET, EXTENDED RELEASE ORAL at 08:53

## 2024-03-19 RX ADMIN — POLYETHYLENE GLYCOL 3350 17 G: 17 POWDER, FOR SOLUTION ORAL at 08:53

## 2024-03-19 NOTE — THERAPY TREATMENT NOTE
Patient Name: Hayden Michaud  : 1965    MRN: [id number]                              Today's Date: 3/19/2024       Admit Date: 3/11/2024    Visit Dx:     ICD-10-CM ICD-9-CM   1. Aortic valve stenosis, etiology of cardiac valve disease unspecified  I35.0 424.1   2. S/P AVR (aortic valve replacement)  Z95.2 V43.3   3. Heart block AV second degree  I44.1 426.13     Patient Active Problem List   Diagnosis    Unstable angina    Aortic valve stenosis    Heart block AV second degree     Past Medical History:   Diagnosis Date    Diabetes mellitus     Heart murmur     Hyperlipidemia     Hypertension     Skin abnormalities     ble  uses a cream    Swollen tonsil     left side     Past Surgical History:   Procedure Laterality Date    AORTIC VALVE REPAIR/REPLACEMENT N/A 3/14/2024    Procedure: INTRA OP MARTIN, STERNOTOMY, AORTIC VALVE REPLACEMENT, EXCLUSION OF LEFT ATRIAL APPENDAGE, PRP;  Surgeon: Solomon West MD;  [facility]  Service: Cardiothoracic;  Laterality: N/A;    CARDIAC CATHETERIZATION N/A 3/12/2024    Procedure: Right Heart Cath;  Surgeon: Hayden Abdullahi MD;  Location: [facility] CATH INVASIVE LOCATION;  Service: Cardiovascular;  Laterality: N/A;    CARDIAC CATHETERIZATION N/A 3/12/2024    Procedure: Left Heart Cath;  Surgeon: Hayden Abdullahi MD;  Location: [facility] CATH INVASIVE LOCATION;  Service: Cardiovascular;  Laterality: N/A;    CARDIAC CATHETERIZATION N/A 3/12/2024    Procedure: Coronary angiography;  Surgeon: Hayden Abdullahi MD;  Location: [facility] CATH INVASIVE LOCATION;  Service: Cardiovascular;  Laterality: N/A;    CARDIAC ELECTROPHYSIOLOGY PROCEDURE Left 3/18/2024    Procedure: Pacemaker DC new medt;  Surgeon: Harvinder Leal MD;  Location: [facility] CATH INVASIVE LOCATION;  Service: Cardiovascular;  Laterality: Left;  [employer]    COLONOSCOPY      ENDOSCOPY      TONSILLECTOMY Left 12/3/2019    Procedure: LEFT TONSIL BIOPSY;  Surgeon: Lebron Truong MD;  Location:   [facility];  Service: ENT    WISDOM TOOTH EXTRACTION        General Information       Row Name 03/19/24 1123          Physical Therapy Time and Intention    Document Type therapy note (daily note)  -MS (r) MH (t) MS (c)     Mode of Treatment individual therapy;physical therapy  -MS (r) MH (t) MS (c)       Row Name 03/19/24 1123          General Information    Patient Profile Reviewed yes  -MS (r) MH (t) MS (c)     Existing Precautions/Restrictions fall;sternal  -MS (r) MH (t) MS (c)       Row Name 03/19/24 1123          Cognition    Orientation Status (Cognition) oriented x 4  -MS (r) MH (t) MS (c)       Row Name 03/19/24 1123          Safety Issues, Functional Mobility    Safety Issues Affecting Function (Mobility) safety precaution awareness  -MS (r) MH (t) MS (c)     Impairments Affecting Function (Mobility) endurance/activity tolerance;postural/trunk control;shortness of breath;balance  -MS (r) MH (t) MS (c)     Comment, Safety Issues/Impairments (Mobility) non-skid socks donned.  -MS (r) MH (t) MS (c)               User Key  (r) = Recorded By, (t) = Taken By, (c) = Cosigned By      Initials Name Provider Type    Shannon Grimes, PT Physical Therapist     Snow Nick, PT Student PT Student                   Mobility       Row Name 03/19/24 1124          Bed Mobility    Supine-Sit [place] (Bed Mobility) not tested  -MS (r) MH (t) MS (c)     Sit-Supine [place] (Bed Mobility) not tested  -MS (r) MH (t) MS (c)     Comment, (Bed Mobility) [facility] upon arrival. Bed mobility not tested.  -MS (r) MH (t) MS (c)       Row Name 03/19/24 1124          Bed-Chair Transfer    Bed-Chair [place] (Transfers) not tested  -MS (r) MH (t) MS (c)     Comment, (Bed-Chair Transfer) Not tested this date.  -MS (r) MH (t) MS (c)       Row Name 03/19/24 1124          Sit-Stand Transfer    Sit-Stand [place] (Transfers) verbal cues;nonverbal cues (demo/gesture);standby assist  -MS (r) MH (t) MS (c)     Comment,  (Sit-Stand Transfer) No AD used this date. No overt LOB.  -MS (r) MH (t) MS (c)       Row Name 03/19/24 1124          Gait/Stairs (Locomotion)    [place] (Gait) verbal cues;standby assist  -MS (r) MH (t) MS (c)     Patient was able to Ambulate yes  -MS (r) MH (t) MS (c)     Distance in Feet (Gait) 400  -MS (r) MH (t) MS (c)     Deviations/Abnormal Patterns (Gait) otis decreased;gait speed decreased  -MS (r) MH (t) MS (c)     [place] (Stairs) not tested  -MS (r) MH (t) MS (c)     Comment, (Gait/Stairs) No AD this date. No LOB or veering noted, vitals WNL during entirety of session.  -MS (r) MH (t) MS (c)               User Key  (r) = Recorded By, (t) = Taken By, (c) = Cosigned By      Initials Name Provider Type    Shannon Grimes, PT Physical Therapist    Snow Fraire, PT Student PT Student                   Obj/Interventions       Row Name 03/19/24 1125          Motor Skills    Therapeutic Exercise --  1 x 10 LAQ, ankle pumps, shoulder shrugs on R side  -MS (r) MH (t) MS (c)       Row Name 03/19/24 1125          Balance    Balance Assessment sitting static balance;sit to stand dynamic balance;standing static balance;standing dynamic balance  -MS (r) MH (t) MS (c)     Static Sitting Balance supervision;non-verbal cues (demo/gesture);verbal cues  -MS (r) MH (t) MS (c)     Position, Sitting Balance unsupported;sitting in chair  -MS (r) MH (t) MS (c)     Sit to Stand Dynamic Balance non-verbal cues (demo/gesture);verbal cues;standby assist  -MS (r) MH (t) MS (c)     Static Standing Balance verbal cues;non-verbal cues (demo/gesture);standby assist  -MS (r) MH (t) MS (c)     Dynamic Standing Balance verbal cues;non-verbal cues (demo/gesture);standby assist  -MS (r) MH (t) MS (c)     Position/Device Used, Standing Balance unsupported  -MS (r) MH (t) MS (c)     Balance Interventions sitting;standing;sit to stand;static;dynamic  -MS (r) MH (t) MS (c)     Comment, Balance No LOB or  veering noted.  -MS (r) MH (t) MS (c)               User Key  (r) = Recorded By, (t) = Taken By, (c) = Cosigned By      Initials Name Provider Type    MS Xie Shannon STANISLAW, PT Physical Therapist    Snow Fraire, PT Student PT Student                   Goals/Plan    No documentation.                  Clinical Impression       Row Name 03/19/24 1126          Pain    Pre/Posttreatment Pain Comment No pain reported this date.  -MS (r) MH (t) MS (c)       Row Name 03/19/24 1126          Plan of Care Review    Plan of Care Reviewed With patient  -MS (r) MH (t) MS (c)     Progress improving  -MS (r) MH (t) MS (c)     Outcome Evaluation Pt agreeable to PT session this date. Pt [facility] upon arrival and sat statically on edge of chair with supervision while performing LE exercises. Pt required SBA for STS transfer and ambulation this date. Pt ambulated 400 ft with SBA, no LOB or veering noted this date. Pt was encouraged to utilize PLB technique while walking. Pt's vitals stayed WNL during entirety of session. Pt was left sitting [facility] with call light in reach at end of session. No further acute PT needs at this time, PT will sign off.  -MS (r) MH (t) MS (c)       Row Name 03/19/24 1126          Vital Signs    Pretreatment Heart Rate (beats/min) 90  -MS (r) MH (t) MS (c)     Pre SpO2 (%) 94  -MS (r) MH (t) MS (c)     O2 Delivery Pre Treatment room air  -MS (r) MH (t) MS (c)     O2 Delivery Intra Treatment room air  -MS (r) MH (t) MS (c)     Post SpO2 (%) 100  -MS (r) MH (t) MS (c)     O2 Delivery Post Treatment room air  -MS (r) MH (t) MS (c)     Pre Patient Position Sitting  -MS (r) MH (t) MS (c)     Intra Patient Position Standing  -MS (r) MH (t) MS (c)     Post Patient Position Sitting  -MS (r) MH (t) MS (c)     Rest Breaks  1  -MS (r) MH (t) MS (c)       Row Name 03/19/24 1126          Positioning and Restraints    Pre-Treatment Position sitting in chair/recliner  -MS (r) MH (t) MS (c)     Post Treatment Position  chair  -MS (r) MH (t) MS (c)     In Chair notified nsg;call light within reach;encouraged to call for assist;exit alarm on;sitting  -MS (r) MH (t) MS (c)               User Key  (r) = Recorded By, (t) = Taken By, (c) = Cosigned By      Initials Name Provider Type    Shannon Grimes STANISLAW, PT Physical Therapist    Snow Fraire, PT Student PT Student                   Outcome Measures       Row Name 03/19/24 1130 03/19/24 0835       How much help from another person do you currently need...    Turning from your back to your side while in flat bed without using bedrails? 4  -MS (r) MH (t) MS (c) 4  -AA    Moving from lying on back to sitting on the side of a flat bed without bedrails? 4  -MS (r) MH (t) MS (c) 4  -AA    Moving to and from a bed to a chair (including a wheelchair)? 4  -MS (r) MH (t) MS (c) 4  -AA    Standing up from a chair using your arms (e.g., wheelchair, bedside chair)? 4  -MS (r) MH (t) MS (c) 3  -AA    Climbing 3-5 steps with a railing? 3  -MS (r) MH (t) MS (c) 3  -AA    To walk in hospital room? 4  -MS (r) MH (t) MS (c) 3  -AA    AM-PAC 6 Clicks Score (PT) 23  -MS (r) MH (t) 21  -AA    Highest Level of Mobility Goal 7 --> Walk 25 feet or more  -MS (r) MH (t) 6 --> Walk 10 steps or more  -AA      Row Name 03/19/24 1130          Functional Assessment    Outcome Measure Options AM-PAC 6 Clicks Basic Mobility (PT)  -MS (r) MH (t) MS (c)               User Key  (r) = Recorded By, (t) = Taken By, (c) = Cosigned By      Initials Name Provider Type    MS XieShannon, PT Physical Therapist    Snow Fraire, PT Student PT Student    Young Ochoa RN Registered Nurse                                 Physical Therapy Education       Title: PT OT SLP Therapies (Done)       Topic: Physical Therapy (Done)       Point: Mobility training (Done)       Learning Progress Summary             Patient Acceptance, E,TB, VU by  at 3/19/2024 1130    Acceptance, E,TB, VU by  at 3/18/2024 1034     Acceptance, E, VU,NR by DJ at 3/17/2024 1147    Acceptance, E, NR by AR at 3/16/2024 1637    Acceptance, E,TB, VU by  at 3/15/2024 0928                         Point: Home exercise program (Done)       Learning Progress Summary             Patient Acceptance, E,TB, VU by  at 3/19/2024 1130    Acceptance, E,TB, VU by  at 3/18/2024 1035    Acceptance, E, VU,NR by DJ at 3/17/2024 1147    Acceptance, E, NR by AR at 3/16/2024 1637    Acceptance, E,TB, VU by  at 3/15/2024 0928                         Point: Body mechanics (Done)       Learning Progress Summary             Patient Acceptance, E,TB, VU by  at 3/19/2024 1130    Acceptance, E,TB, VU by  at 3/18/2024 1035    Acceptance, E, VU,NR by  at 3/17/2024 1147    Acceptance, E, NR by AR at 3/16/2024 1637    Acceptance, E,TB, VU by  at 3/15/2024 0928                         Point: Precautions (Done)       Learning Progress Summary             Patient Acceptance, E,TB, VU by  at 3/19/2024 1130    Acceptance, E,TB, VU by  at 3/18/2024 1035    Acceptance, E, VU,NR by  at 3/17/2024 1147    Acceptance, E, NR by AR at 3/16/2024 1637    Acceptance, E,TB, VU by  at 3/15/2024 0928                                         User Key       Initials Effective Dates Name Provider Type Discipline    AR 06/16/21 -  Ledy Ruvalcaba, PT Physical Therapist PT     10/25/19 -  Alison Flanagan PT Physical Therapist PT     01/24/24 -  Snow Nick PT Student PT Student PT                  PT Recommendation and Plan     Plan of Care Reviewed With: patient  Progress: improving  Outcome Evaluation: Pt agreeable to PT session this date. Pt [facility] upon arrival and sat statically on edge of chair with supervision while performing LE exercises. Pt required SBA for STS transfer and ambulation this date. Pt ambulated 400 ft with SBA, no LOB or veering noted this date. Pt was encouraged to utilize PLB technique while walking. Pt's vitals stayed WNL during entirety of  session. Pt was left sitting [facility] with call light in reach at end of session. No further acute PT needs at this time, PT will sign off.     Time Calculation:         PT Charges       Row Name 03/19/24 1130             Time Calculation    Start Time 0945  -MS (r) MH (t) MS (c)      Stop Time 0956  -MS (r) MH (t) MS (c)      Time Calculation (min) 11 min  -MS (r) MH (t)      PT Received On 03/19/24  -MS (r) MH (t) MS (c)      PT - Next Appointment 03/19/24  -MS (r) MH (t) MS (c)         Time Calculation- PT    Total Timed Code Minutes- PT 11 minute(s)  -MS (r) MH (t) MS (c)         Timed Charges    63630 - PT Therapeutic Exercise Minutes 2  -MS (r) MH (t) MS (c)      17214 - PT Therapeutic Activity Minutes 9  -MS (r) MH (t) MS (c)         Total Minutes    Timed Charges Total Minutes 11  -MS (r) MH (t)       Total Minutes 11  -MS (r) MH (t)                User Key  (r) = Recorded By, (t) = Taken By, (c) = Cosigned By      Initials Name Provider Type    MS XieShannon, PT Physical Therapist    Snow Fraire, PT Student PT Student                  Therapy Charges for Today       Code Description Service Date Service Provider Modifiers Qty    42284714617  PT THERAPEUTIC ACT EA 15 MIN 3/18/2024 Snow Nick, PT Student GP 1    33050659215 HC PT THERAPEUTIC ACT EA 15 MIN 3/19/2024 Snow Nick, PT Student GP 1            PT G-Codes  Outcome Measure Options: AM-PAC 6 Clicks Basic Mobility (PT)  AM-PAC 6 Clicks Score (PT): 23  PT Discharge Summary  Anticipated Discharge Disposition (PT): home with assist, home    Snow Nick PT Student  3/19/2024

## 2024-03-19 NOTE — PLAN OF CARE
Goal Outcome Evaluation:         Patient denies chest pain  nor discomfort, pacemaker insertion 3/18, area glued dry and intact, paced on the monitor, vital signs stable, pt using his cpap from home, up with stand by assist, educate to call for assist, call light in reach cont to monitor.

## 2024-03-19 NOTE — PROGRESS NOTES
[facility] Clinical Pharmacy Services: Patient Counseling Consult    Hayden Michaud and family have been counseled on the following medications: amiodarone, aspirin, atenolol, furosemide. Norco, and potassium. Counseling points included the following:    Explained indication of each medication, and patient's need for these medications.  Went over dosing and frequency of each medication.  Discussed any special administration, storage, or monitoring instructions with medications.  Discussed all important drug interactions, including over-the-counter medications and supplements.  Explained possible side effects for each medication.  Instructed the patient not to begin or discontinue any medications without informing his/her physician/pharmacist.  Talked about keeping a week-long blood pressure journal to bring to her next appointment with the nurse practitioner. Spoke to the importance of taking blood pressure the same time every day.     Patient expressed understanding and had no further questions.      Noelle Callahan, Pharm.D., [facility]   Clinical Pharmacist  Phone Extension #[phone]

## 2024-03-19 NOTE — PROGRESS NOTES
Patient is discharging today. Spoke to patient who is agreeable to home health and reports no current home health. Please note will be at Mom's home - [place] [address] [address]. Her number is [phone] and patients cell is- [phone]. Orders for home health SN in [facility]. Thank you !

## 2024-03-19 NOTE — DISCHARGE SUMMARY
Hospital Discharge    Patient Name: Hayden Michaud  Age/Sex: 58 y.o. male  : 1965  MRN: [id number]    Encounter Provider: COURTNEY Boyer  Referring Provider: Nicola Wagner MD  Place of Service: [facility] CARDIOLOGY  Patient Care Team:  Carol Vanessa MD as PCP - General (General Practice)  Savanah Mitchell MD as Consulting Physician (Radiation Oncology)  Lebron Truong MD as Consulting Physician (Otolaryngology)         Date of Discharge:  3/19/2024   Date of Admit: 3/11/2024    Discharge Condition: Stable  Discharge Diagnosis:    Unstable angina    Aortic valve stenosis    Heart block AV second degree      Hospital Course:   Hayden Michaud is a 58 y.o. male patient who follows with Dr. [facility].  He has diabetes and hypertension and has also been followed for aortic stenosis.  On 3/11, he was admitted with unstable angina.  He was also noted to be in heart block.  Echocardiogram demonstrated hyperdynamic LV function, moderate to severe aortic stenosis, and mild to moderate mitral stenosis.  Cardiac catheterization which demonstrated angiographically normal coronaries, moderately elevated left-sided filling pressures with concerns for possible mitral and tricuspid regurgitation.  Subsequently, he underwent a MARTIN demonstrating severe aortic stenosis, mild to moderate mitral stenosis, and mild mitral regurgitation.  The reason for premature aging of his valve this is unclear.  Ultimately, on 3/14, he underwent a tissue AVR left atrial appendage occlusion.  Postoperatively he developed paroxysmal atrial fibrillation and was started on amiodarone.  Due to the periods of complete heart block preoperatively and a very long prolonged MS interval, he underwent permanent pacemaker placement.  This morning he is resting with no complaints.  Metoprolol has been resumed.  He is stable and ready for discharge.  He will follow-up with the  pacemaker clinic for a site check and device check in 1 week.  He will also follow-up with [facility]. [facility] in 1 week.    Objective:  Temp:  [97.8 °F (36.6 °C)-98.9 °F (37.2 °C)] 98.8 °F (37.1 °C)  Heart Rate:  [] 80  Resp:  [16-30] 18  BP: (123-150)/(73-86) 132/73    Intake/Output Summary (Last 24 hours) at 3/19/2024 1248  Last data filed at 3/19/2024 0858  Gross per 24 hour   Intake 680 ml   Output 100 ml   Net 580 ml     Body mass index is 45.02 kg/m².      03/16/24  0700 03/18/24  0454 03/19/24  0446   Weight: 134 kg (295 lb) 136 kg (299 lb 4.8 oz) 134 kg (296 lb 1.6 oz)     Weight change: -1.452 kg (-3 lb 3.2 oz)    Physical Exam:  Constitutional: Well appearing, well developed, no acute distress   HENT: Oropharynx clear and membrane moist  Eyes: Normal conjunctiva, no sclera icterus.  Neck: Supple, no carotid bruit bilaterally.  Cardiovascular: Regular rate and rhythm, No Murmur, No bilateral lower extremity edema.  Pulmonary: Normal respiratory effort, normal lung sounds, no wheezing.  Abdominal: Soft, nontender, no hepatosplenomegaly, liver is non-pulsatile.  Neurological: Alert and orient x 3.   Skin: Warm, dry, no ecchymosis, no rash.  Psych: Appropriate mood and affect. Normal judgment and insight.    Procedures Performed  Procedure(s):  Pacemaker DC new medt     Echocardiogram 3/12/2024    Left ventricular systolic function is hyperdynamic (EF > 70%). Left ventricular ejection fraction appears to be greater than 70%.    Left ventricular wall thickness is consistent with moderate to severe concentric hypertrophy.    Moderate to severe aortic valve stenosis is present. Aortic valve area is 1.2 cm2.    Peak velocity of the flow distal to the aortic valve is 446.6 cm/s. Aortic valve maximum pressure gradient is 80 mmHg. Aortic valve mean pressure gradient is 40 mmHg. Aortic valve dimensionless index is 0.3 .    Mild to moderate mitral valve stenosis is present. The mitral valve  mean gradient is 7 mmHg.  Mitral valve area by continuity equation 2.0 cm².    [facility]   CARDIAC CATHETERIZATION PROCEDURE REPORT     Patient: Hayden Michaud  : 1965  MRN: [id number]  Procedure Date: 24     Referring Physician:   Wally Alvarez MD     Interventional Cardiologist:   Hayden Abdullahi MD     Indication:  Nonrheumatic aortic valve stenosis     Clinical Presentation:  Mr. Michaud is a 58 y.o. gentleman with newly diagnosed nonrheumatic aortic valve stenoses who is in the hospital with worsening shortness of breath and dyspnea exertion consistent with symptomatic aortic stenosis he was referred for right heart catheterization left heart catheterization part of his workup for his aortic stenosis     Procedure performed:  Diagnostic Left Heart Catheterization  Diagnostic Right Heart Catheterization  Coronary Angiography     Access Sites:  Right radial artery  Right brachial vein     Findings:  1. Coronary Artery Anatomy:  Dominance: Right  Left Main: Angiographically normal  Left Anterior Descending: Moderate caliber size.  Angiographically normal throughout supplies a mid diagonal branch.  Circumflex Artery: Moderate caliber size.  Supplies a high marginal/ramus branch also supplies inferior marginal branches.  Contains luminal regularities throughout the mid segment  Right Coronary Artery: Moderate in caliber size angiographically normal throughout supplying a PDA and posterolateral branches.     2. Hemodynamics:  Right Atrium: 14/24/14 mmHg  Right Ventricle: 30/14 mmHg  Pulmonary Artery: 30/22/28 mmHg  Pulmonary Wedge: 16/24/20 mmHg  Left Ventricle: N/A mmHg  Aorta: 118/69/97 mmHg  Cardiac Output/Index: 4.62 L/min 1.93 L/min/m2  PA Sat: 59 %  AO Sat: 93 %     Conclusions:  Angiographically normal coronary arteries.  Moderately elevated left-sided filling pressures with concerns for possible V wave of 24 mmHg suggesting more significant mitral valve  regurgitation.  Mild pulmonary hypertension of 28 mmHg mean  V wave on the right atrial pressure tracing could represent more significant tricuspid valve regurgitation  Low normal cardiac output and index of 4.6 L/min and 1.9 L/min/m2     Recommendations:   Further workup for surgery aortic valve replacement as well as other valvular disease discussed with Dr. Alvarez tentative plans for MARTIN to further evaluate other valvular disease given limited imaging ability from transthoracic approach        Procedure Details:  Informed consent was obtained with an explanation of the risk and benefits of the procedure. The patient was brought to the Cardiac Catheterization Laboratory and was prepped and draped in a standard sterile fashion. Moderate sedation with Fentanyl and Versed was administered by the circulating nurse. Lidocaine 2% was used to anesthetize the right antecubital fossa and access was obtained in the brachial vein to then place a 5 Yemeni sheath. Lidocaine 2% was used to anesthetize the right radial artery and a 5/6 Slender sheath was placed.       A 5 Yemeni PA catheter was then advanced into the heart and out into the pulmonary artery. Hemodynamics were obtained in the pulmonary wedge, pulmonary artery, right ventricle, and right atrium positions.  Saturations were obtained from the pulmonary artery and radial artery.  A TGR catheter was then advanced over a 0.035 guidewire into the ascending aorta.  This catheter was used to engage the left coronary artery but had troubles engage the right coronary artery we had to exchange for a JR4 to do this but angiographic images were obtained. The catheter was then removed over a guidewire. The radial artery sheath was removed without difficulty and TR Band was placed over the access site with excellent hemostasis. The venous sheath was removed and hemostatsis achieved with manual pressure.     Patient tolerated the procedure well without any complications, and  transferred to the post procedure area for recovery in a stable condition.        Complications:  None.     Estimated Blood Loss:  Minimal.     Hayden Abdullahi MD  [facility]  03/12/24  17:13 EDT        Consults:  Consults       No orders found from 2/11/2024 to 3/12/2024.            Pertinent Test Results:  Results from last 7 days   Lab Units 03/19/24  0535 03/18/24 0313 03/17/24  1111 03/17/24  0414 03/16/24  0309 03/15/24  0311 03/14/24  1538 03/14/24  1106 03/14/24  0251 03/13/24  1740   SODIUM mmol/L 136 138  --  138 139 144 142 140   < > 141   POTASSIUM mmol/L 3.9 4.1 4.0 3.8 4.2 4.2 4.6 4.6   < > 4.5   CHLORIDE mmol/L 100 103  --  105 107 112* 111* 109*   < > 104   CO2 mmol/L 21.1* 22.6  --  21.0* 20.7* 19.8* 20.4* 23.0   < > 25.0   BUN mg/dL 22* 23*  --  22* 20 18 17 19   < > 21*   CREATININE mg/dL 0.92 0.97  --  0.99 1.12 1.21 1.28* 1.30*   < > 1.24   GLUCOSE mg/dL 143* 142*  --  143* 149* 132* 126* 148*   < > 135*   CALCIUM mg/dL 8.6 8.5*  --  8.7 8.3* 7.6* 7.8* 8.4*   < > 9.8   AST (SGOT) U/L  --   --   --   --   --   --   --   --   --  20   ALT (SGPT) U/L  --   --   --   --   --   --   --   --   --  32    < > = values in this interval not displayed.         Results from last 7 days   Lab Units 03/18/24 0313 03/17/24 0414 03/16/24  0309 03/15/24  0311 03/14/24  1538 03/14/24  1106 03/14/24  0957 03/14/24  0751 03/13/24  1740   WBC 10*3/mm3 9.57 13.32* 12.87* 10.07 9.07 12.20*  --   --  6.59   HEMOGLOBIN g/dL 10.7* 11.3* 11.2* 10.2* 11.7* 12.5*  --   --  14.4   HEMOGLOBIN, POC g/dL  --   --   --   --   --   --  10.2*   < >  --    HEMATOCRIT % 32.3* 32.8* 33.2* 30.7* 34.4* 36.3*  --   --  43.1   HEMATOCRIT POC %  --   --   --   --   --   --  30*   < >  --    PLATELETS 10*3/mm3 237 193 172 154 154 174  --   --  254    < > = values in this interval not displayed.     Results from last 7 days   Lab Units 03/15/24  0311 03/14/24  1106 03/13/24  1740   INR  1.34* 1.32* 1.13*   APTT  seconds  --  24.8 28.2     Results from last 7 days   Lab Units 03/15/24  0311 03/14/24  1538 03/14/24  1106 03/13/24  1740   MAGNESIUM mg/dL 2.4 2.6 3.1* 1.7     Results from last 7 days   Lab Units 03/13/24  1740   CHOLESTEROL mg/dL 122   TRIGLYCERIDES mg/dL 124   HDL CHOL mg/dL 27*               Discharge Medications     Discharge Medications        New Medications        Instructions Start Date   amiodarone 200 MG tablet  Commonly known as: PACERONE   200 mg, Oral, Every 12 Hours Scheduled      aspirin 81 MG EC tablet   81 mg, Oral, Daily      atenolol 25 MG tablet  Commonly known as: TENORMIN   25 mg, Oral, Every 24 Hours Scheduled      furosemide 40 MG tablet  Commonly known as: LASIX   40 mg, Oral, Daily      HYDROcodone-acetaminophen 5-325 MG per tablet  Commonly known as: NORCO   1 tablet, Oral, Every 4 Hours PRN      potassium chloride 20 MEQ CR tablet  Commonly known as: KLOR-CON M20   20 mEq, Oral, Daily             Continue These Medications        Instructions Start Date   acetaminophen 500 MG tablet  Commonly known as: TYLENOL   1,000 mg, Oral, Every 6 Hours PRN      atorvastatin 40 MG tablet  Commonly known as: LIPITOR   40 mg, Oral, Nightly      busPIRone 5 MG tablet  Commonly known as: BUSPAR   5 mg, Oral, 2 Times Daily PRN      cetirizine 10 MG tablet  Commonly known as: zyrTEC   10 mg, Oral, Daily      levothyroxine 75 MCG tablet  Commonly known as: SYNTHROID, LEVOTHROID   75 mcg, Oral, Daily      metFORMIN 1000 MG tablet  Commonly known as: GLUCOPHAGE   1,000 mg, Oral, 2 Times Daily With Meals             Stop These Medications      amLODIPine 10 MG tablet  Commonly known as: NORVASC     lisinopril 40 MG tablet  Commonly known as: PRINIVIL,ZESTRIL              Discharge Diet:    Dietary Orders (From admission, onward)       Start     Ordered    03/18/24 1710  Diet: Cardiac; Healthy Heart (2-3 Na+); Fluid Consistency: Thin (IDDSI 0)  Diet Effective Now        References:    Diet Order [facility]    Question Answer Comment   Diets: Cardiac    Cardiac Diet: Healthy Heart (2-3 Na+)    Fluid Consistency: Thin (IDDSI 0)        03/18/24 1709                    Activity at Discharge:  As tolerated    Discharge disposition: home     Discharge Instructions and Follow ups:  Future Appointments   Date Time Provider Department Center   3/25/2024  9:30 AM [facility] [place] DEVICE CHECK [facility] None   4/24/2024  1:00 PM Heather Victor APRN [facility]   5/1/2024  1:00 PM [facility] [place] DEVICE CHECK [facility] None   5/1/2024  1:30 PM Zander Vale APRN [facility] None     Additional Instructions for the Follow-ups that You Need to Schedule       Ambulatory Referral to Cardiac Rehab   As directed      Ambulatory Referral to Home Health ([facility])   As directed      Face to Face Visit Date: 3/19/2024   Follow-up provider for Plan of Care?: I will be treating the patient on an ongoing basis.  Please send me the Plan of Care for signature.   Follow-up provider: VINEET WANG [[id number]]   Reason/Clinical Findings: post-op open heart   Describe mobility limitations that make leaving home difficult: post-operative weakness   Nursing/Therapeutic Services Requested: Skilled Nursing   Skilled nursing orders: Post CABG care   Frequency: 1 Week 1        Call MD With Problems / Concerns   As directed      Instructions:  Call office at [phone] for any drainage, increased redness, or fever over 100.5    Order Comments: Instructions:  Call office at [phone] for any drainage, increased redness, or fever over 100.5         Discharge Follow-up with PCP   As directed       Currently Documented PCP:    Carol Vanessa MD    PCP Phone Number:    [phone]     Follow Up Details: in 1 week        Discharge Follow-up with Specified Provider: COURTNEY Cruz 4/24 at 1pm   As directed      To: COURTNEY Cruz 4/24 at 1pm   Follow Up Details: bring all current medications to appointment                Contact information for follow-up providers       [facility] .    Specialty: Cardiac Rehabilitation  Contact information:  [address] [facility] [address] [phone]             Carol Vanessa MD .    Specialty: General Practice  Why: in 1 week  Contact information:  [address] [facility] [address]  [phone]                       Contact information for after-discharge care       Home Medical Care       [facility] .    Service: Home Health Services  Contact information:  [address]  [facility] [address] [phone]                                   Test Results Pending at Discharge:      COURTNEY Boyer  03/19/24  12:48 EDT    Time: Discharge < 30 min

## 2024-03-19 NOTE — PROGRESS NOTES
Electrophysiology Follow-Up Note      Patient Name: Hayden Michaud  Age/Sex: 58 y.o. male  : 1965  MRN: [id number]      Day of Service: 24       Chief Complaint/Follow-up: AS--s/p AVR, AV block, prolonged [place]--status post PPM     S: doing well this morning, no complaints. Pacemaker site looks great.       Temp:  [97.8 °F (36.6 °C)-98.9 °F (37.2 °C)] 98.6 °F (37 °C)  Heart Rate:  [70-93] 78  Resp:  [16-30] 18  BP: (123-143)/(73-85) 134/79     PHYSICAL EXAM:    General Appearance: No acute distress, well developed and well nourished.   Eyes: Conjunctiva and lids: No erythema, swelling, or discharge. Sclera non-icteric.   HENT: Atraumatic, normocephalic. External eyes, ears, and nose normal.   Respiratory: No signs of respiratory distress. Respiration rhythm and depth normal.   Clear to auscultation. No rales, crackles, rhonchi, or wheezing auscultated.   Cardiovascular:  Heart Rate and Rhythm: Normal, Heart Sounds: Normal S1 and S2. No S3 or S4 noted.  Murmurs: No murmurs noted. No rubs, thrills, or gallops.   Lower Extremities: No edema noted.  Gastrointestinal:  Abdomen soft, non-distended, non-tender.  Musculoskeletal: Normal movement of extremities  Skin: Warm and dry.   Psychiatric: Patient alert and oriented to person, place, and time. Speech and behavior appropriate. Normal mood and affect.       ECG/TELE:         Results from last 7 days   Lab Units 24  0535 24  0313 24  1111 24  0414   SODIUM mmol/L 136 138  --  138   POTASSIUM mmol/L 3.9 4.1 4.0 3.8   CHLORIDE mmol/L 100 103  --  105   CO2 mmol/L 21.1* 22.6  --  21.0*   BUN mg/dL 22* 23*  --  22*   CREATININE mg/dL 0.92 0.97  --  0.99   GLUCOSE mg/dL 143* 142*  --  143*   CALCIUM mg/dL 8.6 8.5*  --  8.7     Results from last 7 days   Lab Units 24  0313 24  0414 24  0309   WBC 10*3/mm3 9.57 13.32* 12.87*   HEMOGLOBIN g/dL 10.7* 11.3* 11.2*   HEMATOCRIT % 32.3* 32.8* 33.2*   PLATELETS 10*3/mm3  237 193 172     Results from last 7 days   Lab Units 03/15/24  0311 03/14/24  1106 03/13/24  1740   INR  1.34* 1.32* 1.13*                   Current Medications:   Scheduled Meds:acetylcysteine, 1 mL, Nebulization, TID - RT  amiodarone, 400 mg, Oral, Q12H  aspirin, 81 mg, Oral, Daily  atorvastatin, 40 mg, Oral, Nightly  cetirizine, 10 mg, Oral, Daily  chlorhexidine, 15 mL, Mouth/Throat, Q12H  furosemide, 40 mg, Oral, BID  guaiFENesin, 1,200 mg, Oral, Q12H  insulin lispro, 2-9 Units, Subcutaneous, 4x Daily AC & at Bedtime  levothyroxine, 75 mcg, Oral, Q AM  mupirocin, , Each Nare, BID  pantoprazole, 40 mg, Oral, QAM  polyethylene glycol, 17 g, Oral, Daily  potassium chloride, 20 mEq, Oral, Daily  senna-docusate sodium, 2 tablet, Oral, Nightly            Unstable angina    Aortic valve stenosis    Heart block AV second degree       Plan:   AV block---s/p PPM--- doing well post pacemaker. Incision without signs of infection or hematoma. Device testing and function normal. Can resume beta blocker if needed.     Will get set up for one week incision/device check.       Zander Vale, COURTNEY  03/19/24  07:55 EDT

## 2024-03-19 NOTE — PROGRESS NOTES
" LOS: 8 days   Patient Care Team:  Carol Vanessa MD as PCP - General (General Practice)  Savanah Mitchell MD as Consulting Physician (Radiation Oncology)  Lebron Truong MD as Consulting Physician (Otolaryngology)    Chief Complaint: Post op     Subjective     Subjective:  Symptoms:  Stable.  No shortness of breath, chest pain, weakness or anxiety.    Diet:  Adequate intake.  No nausea or vomiting.    Activity level: Returning to normal.    Pain:  He reports no pain.        Objective     Vital Signs  Temp:  [97.8 °F (36.6 °C)-98.9 °F (37.2 °C)] 98.5 °F (36.9 °C)  Heart Rate:  [70-93] 70  Resp:  [16-30] 18  BP: (123-153)/(73-92) 133/73  Body mass index is 45.02 kg/m².    Intake/Output Summary (Last 24 hours) at 3/19/2024 0751  Last data filed at 3/19/2024 0446  Gross per 24 hour   Intake 440 ml   Output 100 ml   Net 340 ml     No intake/output data recorded.      Wt Readings from Last 3 Encounters:   03/19/24 134 kg (296 lb 1.6 oz)   12/03/19 (!) 153 kg (337 lb 1 oz)   11/29/19 (!) 154 kg (339 lb)       Flowsheet Rows      Flowsheet Row First Filed Value   Admission Height 172.7 cm (68\") Documented at 03/11/2024 1700   Admission Weight 132 kg (290 lb 4.8 oz) Documented at 03/11/2024 1700            Objective:  General Appearance:  Comfortable, in no acute distress, well-appearing and not in pain.    Vital signs: (most recent): Blood pressure 134/79, pulse 75, temperature 98.6 °F (37 °C), temperature source Oral, resp. rate 18, height 172.7 cm (68\"), weight 134 kg (296 lb 1.6 oz), SpO2 97%.  Vital signs are normal.  No fever.    Output: Producing urine and no stool output (passing gas).    Lungs:  Normal effort and normal respiratory rate.  There are decreased breath sounds.    Heart: Normal rate.  (Paced )  Abdomen: Abdomen is soft and non-distended.  Bowel sounds are normal.     Extremities: Normal range of motion.  There is dependent edema.  (1+ pre-tibial pitting edema, bilaterally )  Neurological: Patient is " "alert and oriented to person, place and time.    Skin:  Warm and dry.  (Sternal incision clean, dry, and intact without erythema, edema, or drainage )              Results Review:        Results from last 7 days   Lab Units 03/18/24 0313 03/17/24  0414 03/16/24  0309   WBC 10*3/mm3 9.57 13.32* 12.87*   HEMOGLOBIN g/dL 10.7* 11.3* 11.2*   HEMATOCRIT % 32.3* 32.8* 33.2*   PLATELETS 10*3/mm3 237 193 172         PT/INR:    No results found for: \"PROTIME\"  /  No results found for: \"INR\"      Results from last 7 days   Lab Units 03/19/24  0535 03/18/24 0313 03/17/24  1111 03/17/24  0414   SODIUM mmol/L 136 138  --  138   POTASSIUM mmol/L 3.9 4.1 4.0 3.8   CHLORIDE mmol/L 100 103  --  105   CO2 mmol/L 21.1* 22.6  --  21.0*   BUN mg/dL 22* 23*  --  22*   CREATININE mg/dL 0.92 0.97  --  0.99   GLUCOSE mg/dL 143* 142*  --  143*   CALCIUM mg/dL 8.6 8.5*  --  8.7         Scheduled Meds:  acetylcysteine, 1 mL, Nebulization, TID - RT  amiodarone, 400 mg, Oral, Q12H  aspirin, 81 mg, Oral, Daily  atorvastatin, 40 mg, Oral, Nightly  cetirizine, 10 mg, Oral, Daily  chlorhexidine, 15 mL, Mouth/Throat, Q12H  furosemide, 40 mg, Oral, BID  guaiFENesin, 1,200 mg, Oral, Q12H  insulin lispro, 2-9 Units, Subcutaneous, 4x Daily AC & at Bedtime  levothyroxine, 75 mcg, Oral, Q AM  mupirocin, , Each Nare, BID  pantoprazole, 40 mg, Oral, QAM  polyethylene glycol, 17 g, Oral, Daily  potassium chloride, 20 mEq, Oral, Daily  senna-docusate sodium, 2 tablet, Oral, Nightly        Infusions:  sodium chloride, 30 mL/hr, Last Rate: 30 mL/hr (03/14/24 1214)          Assessment & Plan         Unstable angina    Aortic valve stenosis    Heart block AV second degree      Assessment & Plan    - Severe aortic valve stenosis   - Hypertension  - DM II -- A1c 7.3  - Hyperlipidemia  - Hx tonsillar cancer (treated with resection and chemotherapy -- 2020)  - Intermittent 2nd/3rd degree heart block    POD5:     Patient looks great this morning, denies pain or SOA, " ambulating well   Dual chamber pacemaker placed yesterday due to second degree AV block   Patient on RA, encourage use of IS/flutter valve  Patient still with pitting edema of bilateral LE -- continue PO diuresis   Discontinue AV wires this morning -- patient ok for discharge from our standpoint 3 hours following wire removal   Will schedule post-op appointment and see him in approximately 4 weeks  Continue routine care     Savita Pierre PA-C  03/19/24  07:51 EDT

## 2024-03-19 NOTE — PLAN OF CARE
Goal Outcome Evaluation:  Plan of Care Reviewed With: patient        Progress: improving  Outcome Evaluation: Pt agreeable to PT session this date. Pt UIC upon arrival and sat statically on edge of chair with supervision while performing LE exercises. Pt required SBA for STS transfer and ambulation this date. Pt ambulated 400 ft with SBA, no LOB or veering noted this date. Pt was encouraged to utilize PLB technique while walking. Pt's vitals stayed WNL during entirety of session. Pt was left sitting UIC with call light in reach at end of session. No further acute PT needs at this time, PT will sign off.      Anticipated Discharge Disposition (PT): home with assist, home

## 2024-03-20 ENCOUNTER — HOME CARE VISIT (OUTPATIENT)
Dept: HOME HEALTH SERVICES | Facility: HOME HEALTHCARE | Age: 59
End: 2024-03-20
Payer: COMMERCIAL

## 2024-03-20 VITALS
SYSTOLIC BLOOD PRESSURE: 130 MMHG | DIASTOLIC BLOOD PRESSURE: 62 MMHG | OXYGEN SATURATION: 96 % | TEMPERATURE: 96.5 F | HEART RATE: 66 BPM | RESPIRATION RATE: 18 BRPM

## 2024-03-20 PROCEDURE — G0299 HHS/HOSPICE OF RN EA 15 MIN: HCPCS

## 2024-03-20 NOTE — OUTREACH NOTE
Prep Survey      Flowsheet Row Responses   Confucianism facility patient discharged from? [facility]   Is LACE score < 7 ? No   Eligibility Readm Mgmt   Discharge diagnosis MARTIN, sternotomy Aortic valve replacement   Does the patient have one of the following disease processes/diagnoses(primary or secondary)? Cardiothoracic surgery   Does the patient have Home health ordered? Yes   What [facility] the [facility] agency?  [facility]   Is there a DME ordered? No   Prep survey completed? Yes            ELVA A - Registered Nurse

## 2024-03-20 NOTE — HOME HEALTH
58M with a history of DM, HTN, aortic stenosis.  Recent hospitalization 3/11/24-3/19/24 due to unstable angina.  On 3/14 he underwent a tissue AVR left atrial appendage occlusion. Postoperatively he developed paroxysmal atrial fibrillation and was started on amiodarone. Due to the periods of complete heart block preoperatively and a very long prolonged MO interval, he underwent permanent pacemaker placement.  New on atenolol. He will follow-up with the pacemaker clinic for a site check and device check in 1 week. He will also follow-up with Dr. Wagner at the [facility] clinic in 1 week.  Medications reconciled.  Incisions without s/s of infection.  Staying at parents home while recovering.  Ambulates without an assistive device.  SN FOC post cardiac surgery.  SN to teach and instruct post cardiac surgery and medication regime.

## 2024-03-21 NOTE — CASE MANAGEMENT/SOCIAL WORK
Case Management Discharge Note      Final Note: Pt discharged to his mothers home with Jew …..Krissy COX/MANINDER CM    Provided Post Acute Provider List?: Yes  Post Acute Provider List: Home Health    Selected Continued Care - Discharged on 3/19/2024 Admission date: 3/11/2024 - Discharge disposition: Home or Self Care      Destination    No services have been selected for the patient.                Durable Medical Equipment    No services have been selected for the patient.                Dialysis/Infusion    No services have been selected for the patient.                Home Medical Care Coordination complete.      Service Provider Selected Services Address Phone Fax Patient Preferred     [facility] [address] [address] [phone] --              Therapy    No services have been selected for the patient.                Community Resources    No services have been selected for the patient.                Community & DME    No services have been selected for the patient.                         Final Discharge Disposition Code: 06 - home with home health care

## 2024-03-22 ENCOUNTER — HOME CARE VISIT (OUTPATIENT)
Dept: HOME HEALTH SERVICES | Facility: HOME HEALTHCARE | Age: 59
End: 2024-03-22
Payer: COMMERCIAL

## 2024-03-22 VITALS
HEART RATE: 60 BPM | OXYGEN SATURATION: 97 % | RESPIRATION RATE: 16 BRPM | DIASTOLIC BLOOD PRESSURE: 70 MMHG | SYSTOLIC BLOOD PRESSURE: 130 MMHG

## 2024-03-22 PROCEDURE — G0493 RN CARE EA 15 MIN HH/HOSPICE: HCPCS

## 2024-03-23 NOTE — HOME HEALTH
Sitting up in chair.  VSS.  Incisions healing well without complications.  Instucted atenolol indications, dosing instructions, and s/e.  Compared electtonic BP cuff with manual readings on each arm.  Instructed take BP daily after morning medications and recored for MD review.  Patient verbalized understanding.

## 2024-03-25 ENCOUNTER — CLINICAL SUPPORT NO REQUIREMENTS (OUTPATIENT)
Age: 59
End: 2024-03-25
Payer: COMMERCIAL

## 2024-03-25 DIAGNOSIS — I44.1 HEART BLOCK AV SECOND DEGREE: Primary | ICD-10-CM

## 2024-03-26 ENCOUNTER — READMISSION MANAGEMENT (OUTPATIENT)
Dept: CALL CENTER | Facility: HOSPITAL | Age: 59
End: 2024-03-26
Payer: COMMERCIAL

## 2024-03-26 NOTE — OUTREACH NOTE
CT Surgery Week 1 Survey      Flowsheet Row Responses   [facility] patient discharged from? [place]   Does the patient have one of the following disease processes/diagnoses(primary or secondary)? Cardiothoracic surgery   Week 1 attempt successful? Yes   Call start time 1818   Call end time 1831   Discharge diagnosis MARTIN, sternotomy Aortic valve replacement   Person spoke with today (if not patient) and relationship pt   Meds reviewed with patient/caregiver? Yes   Is the patient having any side effects they believe may be caused by any medication additions or changes? No   Does the patient have all medications related to this admission filled (includes all antibiotics, pain medications, cardiac medications, etc.) Yes   Is the patient taking all medications as directed (includes completed medication regime)? Yes   Does the patient have a primary care provider?  Yes   Does the patient have an appointment scheduled with their C/T surgeon? Yes   Comments regarding PCP 3/26/24   Has the patient kept scheduled appointments due by today? Yes   Comments Post-Op 4/24/24   Psychosocial issues? No   Did the patient receive a copy of their discharge instructions? Yes   Nursing interventions Reviewed instructions with patient   What is the patient's perception of their health status since discharge? Improving   Nursing interventions Nurse provided patient education   Is the patient/caregiver able to teach back normal signs of recovery? Pain or discomfort at incisional site   Nursing interventions Reassured on normal signs of recovery   Is the patient /caregiver able to teach back basic post-op care? Lifting as instructed by MD in discharge instructions, Shower daily   Is the patient/caregiver able to teach back signs and symptoms of incisional infection? Increased redness, swelling or pain at the incisonal site, Increased drainage or bleeding, Incisional warmth, Pus or odor from incision, Fever   Is the patient/caregiver  able to teach back steps to recovery at home? Rest and rebuild strength, gradually increase activity, Eat a well-balance diet   Is the patient /caregiver able to teach back the importance of cardiac rehab? No   Nursing interventions Provided education on importance of cardiac rehab   If the patient is a current smoker, are they able to teach back resources for cessation? Not a smoker   Is the patient/caregiver able to teach back the hierarchy of who to call/visit for symptoms/problems? PCP, Specialist, Home health nurse, Urgent Care, ED, 911 Yes   Week 1 call completed? Yes   Is the patient interested in additional calls from an ambulatory ? No   Would this patient benefit from a Referral to [facility] Social Work? No   Wrap up additional comments Pt states he is doing good,and denies any issues with surgical site. Reviewed AVS/meds with pt. Pt had PCP/Pacemaker check fu appt. Pt verified Post-op appt   Call end time [phone]              Belinda BOATENG - Registered Nurse

## 2024-03-27 ENCOUNTER — HOME CARE VISIT (OUTPATIENT)
Dept: HOME HEALTH SERVICES | Facility: HOME HEALTHCARE | Age: 59
End: 2024-03-27
Payer: COMMERCIAL

## 2024-03-27 VITALS
RESPIRATION RATE: 18 BRPM | HEART RATE: 72 BPM | SYSTOLIC BLOOD PRESSURE: 118 MMHG | DIASTOLIC BLOOD PRESSURE: 72 MMHG | TEMPERATURE: 97.9 F | OXYGEN SATURATION: 97 %

## 2024-03-27 PROCEDURE — G0493 RN CARE EA 15 MIN HH/HOSPICE: HCPCS

## 2024-03-29 ENCOUNTER — HOME CARE VISIT (OUTPATIENT)
Dept: HOME HEALTH SERVICES | Facility: HOME HEALTHCARE | Age: 59
End: 2024-03-29
Payer: COMMERCIAL

## 2024-03-29 VITALS
TEMPERATURE: 97.6 F | HEART RATE: 60 BPM | SYSTOLIC BLOOD PRESSURE: 118 MMHG | DIASTOLIC BLOOD PRESSURE: 80 MMHG | OXYGEN SATURATION: 99 % | RESPIRATION RATE: 18 BRPM

## 2024-03-29 PROCEDURE — G0493 RN CARE EA 15 MIN HH/HOSPICE: HCPCS

## 2024-04-02 ENCOUNTER — HOME CARE VISIT (OUTPATIENT)
Dept: HOME HEALTH SERVICES | Facility: HOME HEALTHCARE | Age: 59
End: 2024-04-02
Payer: COMMERCIAL

## 2024-04-02 ENCOUNTER — TELEPHONE (OUTPATIENT)
Dept: CARDIOLOGY | Facility: CLINIC | Age: 59
End: 2024-04-02
Payer: COMMERCIAL

## 2024-04-02 VITALS
HEART RATE: 60 BPM | RESPIRATION RATE: 18 BRPM | SYSTOLIC BLOOD PRESSURE: 120 MMHG | OXYGEN SATURATION: 98 % | TEMPERATURE: 98.3 F | DIASTOLIC BLOOD PRESSURE: 80 MMHG

## 2024-04-02 PROCEDURE — G0493 RN CARE EA 15 MIN HH/HOSPICE: HCPCS

## 2024-04-02 NOTE — HOME HEALTH
VSS.  Incision healing well.  Had f/u with PCP.  She cut furosemide and potassium doses in half.  Weight stable.  No edema.  No SOB.  Will continue to monitor.

## 2024-04-02 NOTE — TELEPHONE ENCOUNTER
He was [facility] to [facility].  Does he have plans to follow up with them?      Dr. Alvarez, can you speak on the amio?    He was also supposed to come to our office for a 1 week site check, so I will send this to Zander as well.

## 2024-04-02 NOTE — TELEPHONE ENCOUNTER
It looks like we saw him for his 1 week post new device check on 3/25 for his 1 week check but it has not been uploaded into EPIC yet (possibly it needs to be signed off by CAROLYN JACOME first). There were no major concerns and the incision was healing well at the time - although the glue appeared scant. Would you like me to send you the full report? There were no events recorded.  Kindly,   Meg Schotanus Device RN

## 2024-04-02 NOTE — TELEPHONE ENCOUNTER
Patient states he was sent home with medications for only 30 days  States the amiodarone was sent in for 30 tablets to take twice a day.  Should he have this refilled and will he need to stay on all medications that was called in for him. If so will need refills      [phone]

## 2024-04-04 ENCOUNTER — READMISSION MANAGEMENT (OUTPATIENT)
Dept: CALL CENTER | Facility: HOSPITAL | Age: 59
End: 2024-04-04
Payer: COMMERCIAL

## 2024-04-04 ENCOUNTER — HOME CARE VISIT (OUTPATIENT)
Dept: HOME HEALTH SERVICES | Facility: HOME HEALTHCARE | Age: 59
End: 2024-04-04
Payer: COMMERCIAL

## 2024-04-04 VITALS
DIASTOLIC BLOOD PRESSURE: 84 MMHG | SYSTOLIC BLOOD PRESSURE: 140 MMHG | HEART RATE: 60 BPM | WEIGHT: 278.3 LBS | RESPIRATION RATE: 16 BRPM | OXYGEN SATURATION: 96 % | BODY MASS INDEX: 42.32 KG/M2 | TEMPERATURE: 97.3 F

## 2024-04-04 PROCEDURE — G0300 HHS/HOSPICE OF LPN EA 15 MIN: HCPCS

## 2024-04-04 NOTE — TELEPHONE ENCOUNTER
I just spoke with Enedina at the [facility].  She is going to call the patient and schedule him for an appointment on Monday.    Addendum: I spoke with Enedina again.  Apparently he was seen at [facility] on 3/26 by the APRN.  She is going to call him and addressed all of his questions.

## 2024-04-04 NOTE — TELEPHONE ENCOUNTER
HOME HEALTH CALLED AND SAID THEY TOOK CARE OF HIS INCISION AND IT LOOKS GOOD.  HE NEEDS CARNIFICATION ON MEDICATION. SHOULD HE STILL BE TAKING AMIODARONE? IF SO HE NEEDS RX SENT TO [facility].   WHEN DOES HE NEED A F/U APPT? PLEASE CALL PT TO DISCUSS #[phone]

## 2024-04-04 NOTE — HOME HEALTH
SNV for cp assessment and continous teaching r/t s/p cadiothoracic surgery-MARTIN. sternotomy aortic valve replacement  Nurse noted patient placed call to Cardiology for medication clarification on amiodarone on 04/02/24. Patient out of medication and question if he need to continue to take. Nurse called Cardiologist prior to nurse arrival and left message for MA to call nurse.   He is compliant with his other medications as well these were reviewed with the patient. Patient denies any new complaints of any cardiac issues. Discussed Care Plans intervention and encouraged vaccination updates.   Pt agrees with intervention. Verbalize understanding and denies further questions/concerns today  Nurse called cardiology associates in patient present to get clarification on medication and if patient need f/u appointment. Office will contact patient. Also patient contact number updated in office. Cardiology has patient's mother number for contact.

## 2024-04-04 NOTE — OUTREACH NOTE
CT Surgery Week 2 Survey      Flowsheet Row Responses   [facility] patient discharged from? [place]   Does the patient have one of the following disease processes/diagnoses(primary or secondary)? Cardiothoracic surgery   Week 2 attempt successful? No   Unsuccessful attempts Attempt 1            Guille CAREY - Registered Nurse

## 2024-04-08 ENCOUNTER — READMISSION MANAGEMENT (OUTPATIENT)
Dept: CALL CENTER | Facility: HOSPITAL | Age: 59
End: 2024-04-08
Payer: COMMERCIAL

## 2024-04-08 ENCOUNTER — HOME CARE VISIT (OUTPATIENT)
Dept: HOME HEALTH SERVICES | Facility: HOME HEALTHCARE | Age: 59
End: 2024-04-08
Payer: COMMERCIAL

## 2024-04-08 NOTE — OUTREACH NOTE
CT Surgery Week 2 Survey      Flowsheet Row Responses   [facility] patient discharged from? [place]   Does the patient have one of the following disease processes/diagnoses(primary or secondary)? Cardiothoracic surgery   Week 2 attempt successful? Yes   Call start time 1722   Call end time 1727   Discharge diagnosis MARTIN, sternotomy Aortic valve replacement   Medication alerts for this patient Pt reports that he is currently off Amiodarone   Meds reviewed with patient/caregiver? Yes   Is the patient having any side effects they believe may be caused by any medication additions or changes? No   Does the patient have all medications related to this admission filled (includes all antibiotics, pain medications, cardiac medications, etc.) Yes   Is the patient taking all medications as directed (includes completed medication regime)? Yes   Has the patient kept scheduled appointments due by today? Yes   What is the Home health agency?  [facility]   Has home health visited the patient within 72 hours of discharge? Yes   Comments Pt reports doing well, denies chest pain, dizziness or SOA.Pt is ambulating frequently w/o issues for approx 1mile, he reports.   What is the patient's perception of their health status since discharge? Improving   Nursing interventions Nurse provided patient education   Nursing interventions Reassured on normal signs of recovery   Is the patient /caregiver able to teach back basic post-op care? Continue use of incentive spirometry at least 1 week post discharge, Lifting as instructed by MD in discharge instructions, Practice cough and deep breath every 4 hours while awake   Is the patient/caregiver able to teach back signs and symptoms of incisional infection? Increased redness, swelling or pain at the incisonal site, Increased drainage or bleeding, Incisional warmth   Is the patient/caregiver able to teach back steps to recovery at home? Set small, achievable goals for return to baseline  health, Rest and rebuild strength, gradually increase activity   Is the patient/caregiver able to teach back the hierarchy of who to call/visit for symptoms/problems? PCP, Specialist, Home health nurse, Urgent Care, ED, 911 Yes   Week 2 call completed? Yes   Revoked No further contact(revokes)-requires comment   Call end time [phone]            Antonieta ORR - Registered Nurse

## 2024-04-09 NOTE — CASE COMMUNICATION
Patient missed a SNV visit from [facility] on 4.8.24.     Reason: Nurse spoke to patient via phone and patient made nurse aware that he is out of town and will not be returning home until sometime tomorrow. Patient request no SVN until Thurday d/t upcoming MD appoitments       For your records only.   Per CMS Guidance, MD must be notified of missed/cancelled visits; therefore the prescribed frequency was not met.

## 2024-04-11 ENCOUNTER — HOME CARE VISIT (OUTPATIENT)
Dept: HOME HEALTH SERVICES | Facility: HOME HEALTHCARE | Age: 59
End: 2024-04-11
Payer: COMMERCIAL

## 2024-04-11 VITALS
HEART RATE: 67 BPM | TEMPERATURE: 98.4 F | BODY MASS INDEX: 42.42 KG/M2 | WEIGHT: 279 LBS | OXYGEN SATURATION: 95 % | RESPIRATION RATE: 17 BRPM | DIASTOLIC BLOOD PRESSURE: 90 MMHG | SYSTOLIC BLOOD PRESSURE: 130 MMHG

## 2024-04-11 PROCEDURE — G0299 HHS/HOSPICE OF RN EA 15 MIN: HCPCS

## 2024-04-11 NOTE — HOME HEALTH
Discharge Decision:      Plan for discharge: [facility]     Any declines in outcomes: discuss and document reason/Order received to discharge without goals met? ALL GOALS MET

## 2024-04-26 ENCOUNTER — OFFICE VISIT (OUTPATIENT)
Dept: CARDIAC SURGERY | Facility: CLINIC | Age: 59
End: 2024-04-26
Payer: COMMERCIAL

## 2024-04-26 VITALS
SYSTOLIC BLOOD PRESSURE: 134 MMHG | RESPIRATION RATE: 18 BRPM | BODY MASS INDEX: 41.07 KG/M2 | WEIGHT: 271 LBS | HEART RATE: 60 BPM | DIASTOLIC BLOOD PRESSURE: 84 MMHG | OXYGEN SATURATION: 98 % | HEIGHT: 68 IN | TEMPERATURE: 98.2 F

## 2024-04-26 DIAGNOSIS — Z98.890 S/P LEFT ATRIAL APPENDAGE LIGATION: ICD-10-CM

## 2024-04-26 DIAGNOSIS — Z95.2 S/P AVR (AORTIC VALVE REPLACEMENT): Primary | ICD-10-CM

## 2024-04-26 PROCEDURE — 99024 POSTOP FOLLOW-UP VISIT: CPT | Performed by: PHYSICIAN ASSISTANT

## 2024-04-26 RX ORDER — POTASSIUM CHLORIDE 750 MG/1
10 CAPSULE, EXTENDED RELEASE ORAL DAILY
Qty: 7 CAPSULE | Refills: 0 | Status: SHIPPED | OUTPATIENT
Start: 2024-04-26

## 2024-04-26 RX ORDER — FUROSEMIDE 20 MG/1
20 TABLET ORAL DAILY
Qty: 7 TABLET | Refills: 0 | Status: SHIPPED | OUTPATIENT
Start: 2024-04-26

## 2024-04-26 NOTE — PROGRESS NOTES
"CARDIOVASCULAR SURGERY FOLLOW-UP PROGRESS NOTE  Chief Complaint: Post-op Follow Up        HPI:   Dear Dr. Vanessa, Carol MARLEY MD and colleagues:    It was nice to see Hayden Michaud in follow up today after cardiac surgery.  As you know, he is a 58 y.o. male who underwent AVR with a bioprosthetic valve and left atrial appendage closure at [facility] by Dr. West on 3/14/24. He did well postoperatively and continues to do well. He comes in today with no significant complaints. He reports some burning in both sides of his chest, but states this is improving. His activity level has been good. He has seen Cardiology and has another follow-up appointment in May. He states at his last appointment he was in sinus rhythm, so his Amiodarone was discontinued. He started cardiac rehab this morning. He reports walking 1-2 miles a day and feels that he is getting his strength and stamina back. He is planning to go back to work in June.     Physical Exam:         /84 (BP Location: Left arm, Patient Position: Sitting, Cuff Size: Adult)   Pulse 60   Temp 98.2 °F (36.8 °C)   Resp 18   Ht 172.7 cm (68\")   Wt 123 kg (271 lb)   SpO2 98%   BMI 41.21 kg/m²   Heart:  regular rate and rhythm, S1, S2 normal, no murmur, click, rub or gallop  Lungs:  clear to auscultation bilaterally  Extremities:  trace lower extremity edema bilaterally  Incision(s):  mid chest healing well without erythema or drainage, sternum stable    Assessment/Plan:     S/P AVR & left atrial appendage occlusion. Overall, he is doing well.    Post-operatively he had paroxysmal atrial fibrillation, but now appears to be in sinus rhythm.     OK to drive if not taking narcotic pain medicine.  OK to begin cardiac rehab.  Follow-up as scheduled with cardiology.  Follow-up as scheduled with PCP.  Follow-up with CT surgery prn.    Continue lifting restriction of 10 lbs until 6 weeks and 50 lbs until 12 weeks from the date of surgery, no excessive jarring " motions or twisting motions until 12 weeks from the date of surgery.    Since he still has trace lower extremity edema, I have prescribed him another 7 day course of Lasix 20 mg po daily with KCl 10 mEq.     Thank you for allowing me to participate in the care of your patient.    Regards,  RADHA Machuca

## 2024-04-26 NOTE — LETTER
"April 26, 2024       No Recipients    Patient: Hayden Michaud   YOB: 1965   Date of Visit: 4/26/2024     Dear Carol Vanessa MD:       Thank you for referring Hayden Michaud to me for evaluation. Below are the relevant portions of my assessment and plan of care.    If you have questions, please do not hesitate to call me. I look forward to following Hayden along with you.         Sincerely,        RADHA Machuca        CC:   No Recipients    Jhoana Cardoso PA  04/26/24 1552  Sign when Signing Visit  CARDIOVASCULAR SURGERY FOLLOW-UP PROGRESS NOTE  Chief Complaint: Post-op Follow Up        HPI:   Dear Carol Cormier MD and colleagues:    It was nice to see Hayden Michaud in follow up today after cardiac surgery.  As you know, he is a 58 y.o. male who underwent AVR with a bioprosthetic valve and left atrial appendage closure at [facility] by Dr. West on 3/14/24. He did well postoperatively and continues to do well. He comes in today with no significant complaints. He reports some burning in both sides of his chest, but states this is improving. His activity level has been good. He has seen Cardiology and has another follow-up appointment in May. He states at his last appointment he was in sinus rhythm, so his Amiodarone was discontinued. He started cardiac rehab this morning. He reports walking 1-2 miles a day and feels that he is getting his strength and stamina back. He is planning to go back to work in June.     Physical Exam:         /84 (BP Location: Left arm, Patient Position: Sitting, Cuff Size: Adult)   Pulse 60   Temp 98.2 °F (36.8 °C)   Resp 18   Ht 172.7 cm (68\")   Wt 123 kg (271 lb)   SpO2 98%   BMI 41.21 kg/m²   Heart:  regular rate and rhythm, S1, S2 normal, no murmur, click, rub or gallop  Lungs:  clear to auscultation bilaterally  Extremities:  trace lower extremity edema bilaterally  Incision(s):  mid chest healing well without erythema " or drainage, sternum stable    Assessment/Plan:     S/P AVR & left atrial appendage occlusion. Overall, he is doing well.    Post-operatively he had paroxysmal atrial fibrillation, but now appears to be in sinus rhythm.     OK to drive if not taking narcotic pain medicine.  OK to begin cardiac rehab.  Follow-up as scheduled with cardiology.  Follow-up as scheduled with PCP.  Follow-up with CT surgery prn.    Continue lifting restriction of 10 lbs until 6 weeks and 50 lbs until 12 weeks from the date of surgery, no excessive jarring motions or twisting motions until 12 weeks from the date of surgery.    Since he still has trace lower extremity edema, I have prescribed him another 7 day course of Lasix 20 mg po daily with KCl 10 mEq.     Thank you for allowing me to participate in the care of your patient.    Regards,  RADHA Machuca

## 2024-05-01 ENCOUNTER — OFFICE VISIT (OUTPATIENT)
Age: 59
End: 2024-05-01
Payer: COMMERCIAL

## 2024-05-01 ENCOUNTER — CLINICAL SUPPORT NO REQUIREMENTS (OUTPATIENT)
Age: 59
End: 2024-05-01
Payer: COMMERCIAL

## 2024-05-01 VITALS
HEIGHT: 68 IN | DIASTOLIC BLOOD PRESSURE: 86 MMHG | WEIGHT: 281 LBS | BODY MASS INDEX: 42.59 KG/M2 | SYSTOLIC BLOOD PRESSURE: 134 MMHG | HEART RATE: 60 BPM

## 2024-05-01 DIAGNOSIS — I44.1 HEART BLOCK AV SECOND DEGREE: Primary | ICD-10-CM

## 2024-05-01 RX ORDER — TIRZEPATIDE 2.5 MG/.5ML
INJECTION, SOLUTION SUBCUTANEOUS WEEKLY
COMMUNITY
Start: 2024-04-10

## 2024-05-01 NOTE — PROGRESS NOTES
Date of Office Visit: 2024  Encounter Provider: COURTNEY Braun  Place of Service: [facility] CARDIOLOGY  Patient Name: Hayden Michaud  :1965    Chief Complaint   Patient presents with    heart block AV second degree    Pacemaker Check   :     HPI: Hayden Michaud is a 59 y.o. male who follows with Dr. Wagner and Dr. Alvarez. He has aortic stenosis---s/p AVR and PARISH occlusion.     He has history of severe aortic stenosis.     In march he had MARTIN showing worsening stenosis.     On 3/14 he underwent aortic valve replacement and PARISH occlusion. He had AV block post op.    He underwent DC LB pacemaker on 3/18.                  He presents today for follow up appt.     Since surgery he has been doing great.     He has no complaints or concerns about his device.     Normal device testing and function in office. AP: 60%, RV: 98%.     No events on device.     He is in cardiac rehab.     Incision well healed.     Past Medical History:   Diagnosis Date    Diabetes mellitus     Heart murmur     Heart valve disease     Hyperlipidemia     Hypertension     Skin abnormalities     ble  uses a cream    Swollen tonsil     left side       Past Surgical History:   Procedure Laterality Date    AORTIC VALVE REPAIR/REPLACEMENT N/A 3/14/2024    Procedure: INTRA OP MARTIN, STERNOTOMY, AORTIC VALVE REPLACEMENT, EXCLUSION OF LEFT ATRIAL APPENDAGE, PRP;  Surgeon: Solomon West MD;  Location: [facility];  Service: Cardiothoracic;  Laterality: N/A;    CARDIAC CATHETERIZATION N/A 3/12/2024    Procedure: Right Heart Cath;  Surgeon: Hayden Abdullahi MD;  Location: [facility] CATH INVASIVE LOCATION;  Service: Cardiovascular;  Laterality: N/A;    CARDIAC CATHETERIZATION N/A 3/12/2024    Procedure: Left Heart Cath;  Surgeon: Hayden Abdullahi MD;  Location: [facility] CATH INVASIVE LOCATION;  Service: Cardiovascular;  Laterality: N/A;    CARDIAC CATHETERIZATION N/A 3/12/2024    Procedure: Coronary  angiography;  Surgeon: Hayden Abdullahi MD;  Location: [facility] CATH INVASIVE LOCATION;  Service: Cardiovascular;  Laterality: N/A;    CARDIAC ELECTROPHYSIOLOGY PROCEDURE Left 3/18/2024    Procedure: Pacemaker DC new medt;  Surgeon: Harvinder Leal MD;  Location: [facility] CATH INVASIVE LOCATION;  Service: Cardiovascular;  Laterality: Left;  [employer]    COLONOSCOPY      ENDOSCOPY      TONSILLECTOMY Left 12/3/2019    Procedure: LEFT TONSIL BIOPSY;  Surgeon: Lebron Truong MD;  Location: [facility] MAIN OR;  Service: ENT    WISDOM TOOTH EXTRACTION         Social History     Socioeconomic History    Marital status:    Tobacco Use    Smoking status: Never    Smokeless tobacco: Never   Vaping Use    Vaping status: Never Used   Substance and Sexual Activity    Alcohol use: Yes     Comment: 1 monthly    Drug use: No       Family History   Problem Relation Age of Onset    Malig Hyperthermia Neg Hx        Review of Systems   Constitutional: Negative for chills, fever and malaise/fatigue.   Cardiovascular:  Negative for chest pain, dyspnea on exertion, leg swelling, near-syncope, orthopnea, palpitations, paroxysmal nocturnal dyspnea and syncope.   Respiratory:  Negative for cough and shortness of breath.    Hematologic/Lymphatic: Negative.    Musculoskeletal:  Negative for joint pain, joint swelling and myalgias.   Gastrointestinal:  Negative for abdominal pain, diarrhea, melena, nausea and vomiting.   Genitourinary:  Negative for frequency and hematuria.   Neurological:  Negative for light-headedness, numbness, paresthesias and seizures.   Allergic/Immunologic: Negative.    All other systems reviewed and are negative.      No Known Allergies      Current Outpatient Medications:     acetaminophen (TYLENOL) 500 MG tablet, Take 2 tablets by mouth Every 6 (Six) Hours As Needed for Mild Pain. Indications: Pain, Disp: , Rfl:     aspirin 81 MG EC tablet, Take 1 tablet by mouth Daily., Disp: 30 tablet, Rfl: 0    atenolol  "(TENORMIN) 25 MG tablet, Take 1 tablet by mouth Daily., Disp: 30 tablet, Rfl: 0    atorvastatin (LIPITOR) 40 MG tablet, Take 1 tablet by mouth Every Night. Indications: High Amount of Fats in the Blood, Disp: , Rfl:     busPIRone (BUSPAR) 5 MG tablet, Take 1 tablet by mouth 2 (Two) Times a Day As Needed (anxiety). Indications: Anxiety Disorder, Disp: , Rfl:     cetirizine (zyrTEC) 10 MG tablet, Take 1 tablet by mouth Daily. Indications: Hayfever, Disp: , Rfl:     furosemide (LASIX) 20 MG tablet, Take 1 tablet by mouth Daily. Indications: High Blood Pressure Disorder, Disp: 7 tablet, Rfl: 0    levothyroxine (SYNTHROID, LEVOTHROID) 75 MCG tablet, Take 1 tablet by mouth Daily. Indications: Underactive Thyroid, Disp: , Rfl:     metFORMIN (GLUCOPHAGE) 1000 MG tablet, Take 1 tablet by mouth 2 (Two) Times a Day With Meals. Indications: Type 2 Diabetes, Disp: , Rfl:     Mounjaro 2.5 MG/0.5ML solution pen-injector pen, Inject  under the skin into the appropriate area as directed 1 (One) Time Per Week., Disp: , Rfl:     potassium chloride (MICRO-K) 10 MEQ CR capsule, Take 1 capsule by mouth Daily., Disp: 7 capsule, Rfl: 0      Objective:     Vitals:    05/01/24 1302   BP: 134/86   Pulse: 60   Weight: 127 kg (281 lb)   Height: 172.7 cm (68\")     Body mass index is 42.73 kg/m².    PHYSICAL EXAM:    Vitals Reviewed.   General Appearance: No acute distress, well developed and well nourished.   Eyes: Conjunctiva and lids: No erythema, swelling, or discharge. Sclera non-icteric.   HENT: Atraumatic, normocephalic. External eyes, ears, and nose normal.   Respiratory: No signs of respiratory distress. Respiration rhythm and depth normal.   Clear to auscultation. No rales, crackles, rhonchi, or wheezing auscultated.   Cardiovascular:  Heart Rate and Rhythm: Normal, Heart Sounds: Normal S1 and S2. No S3 or S4 noted.  Gastrointestinal:  Abdomen soft, non-distended, non-tender.   Musculoskeletal: Normal movement of extremities  Skin: Warm " and dry.   Psychiatric: Patient alert and oriented to person, place, and time. Speech and behavior appropriate. Normal mood and affect.       ECG 12 Lead    Date/Time: 5/1/2024 1:37 PM  Performed by: Zander Vale APRN    Authorized by: Zander Vale APRN  Comparison: compared with previous ECG   Similar to previous ECG  Rhythm: sinus rhythm            Assessment:       Diagnosis Plan   1. Heart block AV second degree               Plan:   CHB---s/p DC LB PPM (3/2024)----- he has done well since valve repair and pacemaker for post op CHB. Normal device testing and function. AP: 40%, RV:98%. No events on device. Incision is well healed. He has no complaints or concerns.             Follow up in 3 months with device check.             As always, it has been a pleasure to participate in your patient's care.      Sincerely,         COURTNEY Braun

## 2024-05-28 ENCOUNTER — TELEPHONE (OUTPATIENT)
Dept: CARDIAC SURGERY | Facility: CLINIC | Age: 59
End: 2024-05-28
Payer: COMMERCIAL

## 2024-05-28 NOTE — TELEPHONE ENCOUNTER
Caller: Hayden Michaud    Relationship: Self    Best call back number: [phone]    What form or medical record are you requesting: RELEASE TO WORK    Who is requesting this form or medical record from you: EMPLOYER    How would you like to receive the form or medical records (pick-up, mail, fax):     Timeframe paperwork needed: JUNE 3RD IS WHEN PT IS EXPECTED BACK TO WORK. PLEASE HAVE PAPERWORK READY NO LATER THAN FRIDAY MORNING. PLEASE CALL PT WHEN HE CAN COME . THANK YOU    Additional notes: PT EMPLOYER REQUIRES RELEASE TO WORK. THANK YOU

## 2024-05-30 ENCOUNTER — TELEPHONE (OUTPATIENT)
Dept: CARDIAC SURGERY | Facility: CLINIC | Age: 59
End: 2024-05-30
Payer: COMMERCIAL

## 2024-05-30 NOTE — TELEPHONE ENCOUNTER
Left patient a vm letting him know he could come to the [place] office and pickup his return to work letter.

## 2024-05-30 NOTE — TELEPHONE ENCOUNTER
Return to work note completed. Message to  staff to print and notify patient letter is ready for pickup

## 2024-08-06 ENCOUNTER — CLINICAL SUPPORT NO REQUIREMENTS (OUTPATIENT)
Age: 59
End: 2024-08-06
Payer: COMMERCIAL

## 2024-08-06 ENCOUNTER — OFFICE VISIT (OUTPATIENT)
Age: 59
End: 2024-08-06
Payer: COMMERCIAL

## 2024-08-06 VITALS
HEIGHT: 68 IN | WEIGHT: 268 LBS | BODY MASS INDEX: 40.62 KG/M2 | HEART RATE: 60 BPM | DIASTOLIC BLOOD PRESSURE: 86 MMHG | SYSTOLIC BLOOD PRESSURE: 128 MMHG

## 2024-08-06 DIAGNOSIS — Z95.0 PRESENCE OF CARDIAC PACEMAKER: Primary | ICD-10-CM

## 2024-08-06 DIAGNOSIS — I44.1 HEART BLOCK AV SECOND DEGREE: Primary | ICD-10-CM

## 2024-08-06 PROCEDURE — 99213 OFFICE O/P EST LOW 20 MIN: CPT | Performed by: INTERNAL MEDICINE

## 2024-08-06 PROCEDURE — 93000 ELECTROCARDIOGRAM COMPLETE: CPT | Performed by: INTERNAL MEDICINE

## 2024-08-06 NOTE — PROGRESS NOTES
Date of Office Visit: 2024  Encounter Provider: Harvinder Leal MD  Place of Service: [facility]  Patient Name: Hayden Michaud  :1965    Chief Complaint   Patient presents with   • 2nd Degree AVB   • Follow-up     3 month   :     HPI: Hayden Michaud is a 59 y.o. male who presents today for follow of dual chamber pacemaker for AV Block following surgery.     Device was implanted in March.  He had done well, no issues with the device or concerns.     One SVT event that appears to be ST.     Incision is well healed.     He follows with Bernard at [facility]          Past Medical History:   Diagnosis Date   • Diabetes mellitus    • Heart murmur    • Heart valve disease    • Hyperlipidemia    • Hypertension    • Skin abnormalities     ble  uses a cream   • Swollen tonsil     left side       Past Surgical History:   Procedure Laterality Date   • AORTIC VALVE REPAIR/REPLACEMENT N/A 3/14/2024    Procedure: INTRA OP MARTIN, STERNOTOMY, AORTIC VALVE REPLACEMENT, EXCLUSION OF LEFT ATRIAL APPENDAGE, PRP;  Surgeon: Solomon West MD;  Location: [facility];  Service: Cardiothoracic;  Laterality: N/A;   • CARDIAC CATHETERIZATION N/A 3/12/2024    Procedure: Right Heart Cath;  Surgeon: Hayden Abdullahi MD;  Location: [facility] CATH INVASIVE LOCATION;  Service: Cardiovascular;  Laterality: N/A;   • CARDIAC CATHETERIZATION N/A 3/12/2024    Procedure: Left Heart Cath;  Surgeon: Hayden Abdullahi MD;  Location: [facility] CATH INVASIVE LOCATION;  Service: Cardiovascular;  Laterality: N/A;   • CARDIAC CATHETERIZATION N/A 3/12/2024    Procedure: Coronary angiography;  Surgeon: Hayden Abdullahi MD;  Location: [facility] CATH INVASIVE LOCATION;  Service: Cardiovascular;  Laterality: N/A;   • CARDIAC ELECTROPHYSIOLOGY PROCEDURE Left 3/18/2024    Procedure: Pacemaker DC new medt;  Surgeon: Harvinder Leal MD;  Location: [facility] CATH INVASIVE LOCATION;  Service: Cardiovascular;   Laterality: Left;  [employer]   • COLONOSCOPY     • ENDOSCOPY     • TONSILLECTOMY Left 12/3/2019    Procedure: LEFT TONSIL BIOPSY;  Surgeon: Lebron Truong MD;  Location: [facility];  Service: ENT   • WISDOM TOOTH EXTRACTION         Social History     Socioeconomic History   • Marital status:    Tobacco Use   • Smoking status: Never   • Smokeless tobacco: Never   Vaping Use   • Vaping status: Never Used   Substance and Sexual Activity   • Alcohol use: Yes     Comment: 1 monthly   • Drug use: No       Family History   Problem Relation Age of Onset   • No Known Problems Mother    • No Known Problems Father    • Malig Hyperthermia Neg Hx        Review of Systems   Constitutional: Negative.   Cardiovascular: Negative.    Respiratory: Negative.     Gastrointestinal: Negative.        No Known Allergies      Current Outpatient Medications:   •  acetaminophen (TYLENOL) 500 MG tablet, Take 2 tablets by mouth Every 6 (Six) Hours As Needed for Mild Pain. Indications: Pain, Disp: , Rfl:   •  aspirin 81 MG EC tablet, Take 1 tablet by mouth Daily., Disp: 30 tablet, Rfl: 0  •  atenolol (TENORMIN) 25 MG tablet, Take 1 tablet by mouth Daily., Disp: 30 tablet, Rfl: 0  •  atorvastatin (LIPITOR) 40 MG tablet, Take 1 tablet by mouth Every Night. Indications: High Amount of Fats in the Blood, Disp: , Rfl:   •  busPIRone (BUSPAR) 5 MG tablet, Take 1 tablet by mouth 2 (Two) Times a Day As Needed (anxiety). Indications: Anxiety Disorder, Disp: , Rfl:   •  cetirizine (zyrTEC) 10 MG tablet, Take 1 tablet by mouth Daily. Indications: Hayfever, Disp: , Rfl:   •  levothyroxine (SYNTHROID, LEVOTHROID) 75 MCG tablet, Take 1 tablet by mouth Daily. Indications: Underactive Thyroid, Disp: , Rfl:   •  metFORMIN (GLUCOPHAGE) 1000 MG tablet, Take 1 tablet by mouth 2 (Two) Times a Day With Meals. Indications: Type 2 Diabetes, Disp: , Rfl:   •  Mounjaro 2.5 MG/0.5ML solution pen-injector pen, Inject  under the skin into the appropriate area as  "directed 1 (One) Time Per Week., Disp: , Rfl:       Objective:     Vitals:    08/06/24 0921   BP: 128/86   Pulse: 60   Weight: 122 kg (268 lb)   Height: 172.7 cm (68\")     Body mass index is 40.75 kg/m².    PHYSICAL EXAM:    Vitals and nursing note reviewed.   Constitutional:       General: Not in acute distress.  Pulmonary:      Effort: Pulmonary effort is normal. No respiratory distress.   Chest:      Comments: Pacemaker on left chest, pocket appears normal  Cardiovascular:      Normal rate. Regular rhythm.   Edema:     Peripheral edema absent.   Skin:     General: Skin is warm and dry.   Neurological:      Mental Status: Alert and oriented to person, place, and time.   Psychiatric:         Behavior: Behavior normal.         Thought Content: Thought content normal.         Judgment: Judgment normal.           ECG 12 Lead    Date/Time: 8/6/2024 10:13 AM  Performed by: Harvinder Leal MD    Authorized by: Harvinder Leal MD  Comparison: compared with previous ECG from 5/1/2024  Similar to previous ECG  Rhythm: sinus rhythm and paced    Clinical impression: normal ECG          Assessment:       Diagnosis Plan   1. Heart block AV second degree               Plan:       Normal pacemaker function.     LVS vs deep septal capture, stable electrical parameters.     He will probably have device followed by [facility], but if not, he can return in one year    As always, it has been a pleasure to participate in your patient's care.      Sincerely,         Harvinder Leal MD  "

## 2025-04-29 ENCOUNTER — TELEPHONE (OUTPATIENT)
Age: 60
End: 2025-04-29
Payer: COMMERCIAL

## 2025-04-29 NOTE — TELEPHONE ENCOUNTER
The pt's MDT pacemaker shows an increase in the auto RV threshold testing that appears to have gone from around 1V to 2-2.5V @ 0.4ms. See graph below. Auto is adjusting output to 4.75V @ 0.4ms.  It does not appear that the pt is dependent but does pace in the ventricle 99.9% and was placed for AV second degree block. The pt has appt with [facility] APRN and device check in August. Would you like us to get them in the office sooner for manual testing?   Kindly,   Heidi Michel Device RN

## 2025-05-01 ENCOUNTER — CLINICAL SUPPORT NO REQUIREMENTS (OUTPATIENT)
Age: 60
End: 2025-05-01
Payer: COMMERCIAL

## 2025-05-01 DIAGNOSIS — I44.1 HEART BLOCK AV SECOND DEGREE: Primary | ICD-10-CM

## 2025-05-01 NOTE — TELEPHONE ENCOUNTER
Pt in office for RV lead testing due to increased thresholds on AUTO testing on remotes. Pt's RV threshold today is resulting 2-2.5V @ 0.4ms and 1.25V @ 1ms (tested twice). Based on the adaptive setting output high and decreasing battery, I switched the output to 3V @ 1ms and set to MONITOR. This saved a little bit of battery. I reached out to CAROLYN JACOME via text and he agreed with changes today.  We will reevaluate at appt in August.

## 2025-06-18 LAB
MC_CV_MDC_IDC_RATE_1: 150
MC_CV_MDC_IDC_RATE_1: 171
MC_CV_MDC_IDC_ZONE_ID: 2
MC_CV_MDC_IDC_ZONE_ID: 6
MDC_IDC_MSMT_BATTERY_REMAINING_LONGEVITY: 75 MO
MDC_IDC_MSMT_BATTERY_RRT_TRIGGER: 2.62
MDC_IDC_MSMT_BATTERY_STATUS: NORMAL
MDC_IDC_MSMT_BATTERY_VOLTAGE: 3.04
MDC_IDC_MSMT_LEADCHNL_RA_DTM: NORMAL
MDC_IDC_MSMT_LEADCHNL_RA_IMPEDANCE_VALUE: 494
MDC_IDC_MSMT_LEADCHNL_RA_PACING_THRESHOLD_AMPLITUDE: 0.62
MDC_IDC_MSMT_LEADCHNL_RA_PACING_THRESHOLD_POLARITY: NORMAL
MDC_IDC_MSMT_LEADCHNL_RA_PACING_THRESHOLD_PULSEWIDTH: 0.4
MDC_IDC_MSMT_LEADCHNL_RA_SENSING_INTR_AMPL: 1.5
MDC_IDC_MSMT_LEADCHNL_RV_DTM: NORMAL
MDC_IDC_MSMT_LEADCHNL_RV_IMPEDANCE_VALUE: 475
MDC_IDC_MSMT_LEADCHNL_RV_PACING_THRESHOLD_AMPLITUDE: 2.5
MDC_IDC_MSMT_LEADCHNL_RV_PACING_THRESHOLD_POLARITY: NORMAL
MDC_IDC_MSMT_LEADCHNL_RV_PACING_THRESHOLD_PULSEWIDTH: 0.4
MDC_IDC_MSMT_LEADCHNL_RV_SENSING_INTR_AMPL: 31.62
MDC_IDC_PG_IMPLANT_DTM: NORMAL
MDC_IDC_PG_MFG: NORMAL
MDC_IDC_PG_MODEL: NORMAL
MDC_IDC_PG_SERIAL: NORMAL
MDC_IDC_PG_TYPE: NORMAL
MDC_IDC_SESS_DTM: NORMAL
MDC_IDC_SESS_TYPE: NORMAL
MDC_IDC_SET_BRADY_AT_MODE_SWITCH_RATE: 171
MDC_IDC_SET_BRADY_LOWRATE: 60
MDC_IDC_SET_BRADY_MAX_SENSOR_RATE: 130
MDC_IDC_SET_BRADY_MAX_TRACKING_RATE: 130
MDC_IDC_SET_BRADY_MODE: NORMAL
MDC_IDC_SET_BRADY_PAV_DELAY: 180
MDC_IDC_SET_BRADY_SAV_DELAY: 150
MDC_IDC_SET_LEADCHNL_RA_PACING_AMPLITUDE: 1.5
MDC_IDC_SET_LEADCHNL_RA_PACING_POLARITY: NORMAL
MDC_IDC_SET_LEADCHNL_RA_PACING_PULSEWIDTH: 0.4
MDC_IDC_SET_LEADCHNL_RA_SENSING_POLARITY: NORMAL
MDC_IDC_SET_LEADCHNL_RA_SENSING_SENSITIVITY: 0.3
MDC_IDC_SET_LEADCHNL_RV_PACING_AMPLITUDE: 3
MDC_IDC_SET_LEADCHNL_RV_PACING_POLARITY: NORMAL
MDC_IDC_SET_LEADCHNL_RV_PACING_PULSEWIDTH: 1
MDC_IDC_SET_LEADCHNL_RV_SENSING_POLARITY: NORMAL
MDC_IDC_SET_LEADCHNL_RV_SENSING_SENSITIVITY: 0.9
MDC_IDC_SET_ZONE_STATUS: NORMAL
MDC_IDC_SET_ZONE_STATUS: NORMAL
MDC_IDC_SET_ZONE_TYPE: NORMAL
MDC_IDC_SET_ZONE_TYPE: NORMAL
MDC_IDC_STAT_AT_BURDEN_PERCENT: 0
MDC_IDC_STAT_BRADY_RA_PERCENT_PACED: 8.64
MDC_IDC_STAT_BRADY_RV_PERCENT_PACED: 99.16

## 2025-07-24 ENCOUNTER — TELEPHONE (OUTPATIENT)
Dept: CARDIOLOGY | Age: 60
End: 2025-07-24
Payer: COMMERCIAL

## 2025-07-24 NOTE — TELEPHONE ENCOUNTER
Caller: Hayden Michaud    Relationship: Self    Best call back number:       PATIENT IS NEEDING TO RESCHEDULE HIS 8.7 APPT BECAUSE OF SURGERY ON 8.5    HE WOULD LIKE TO BE SEEN SOONER THEN 8.5 IF POSSIBLE.   I DO NOT HAVE AVAILABILITY, SCHEDULED NEXT AVAIL 10.10 @1:30

## 2025-07-29 LAB
MC_CV_MDC_IDC_RATE_1: 150
MC_CV_MDC_IDC_RATE_1: 171
MC_CV_MDC_IDC_ZONE_ID: 2
MC_CV_MDC_IDC_ZONE_ID: 6
MDC_IDC_MSMT_BATTERY_REMAINING_LONGEVITY: 74 MO
MDC_IDC_MSMT_BATTERY_RRT_TRIGGER: 2.62
MDC_IDC_MSMT_BATTERY_STATUS: NORMAL
MDC_IDC_MSMT_BATTERY_VOLTAGE: 3.03
MDC_IDC_MSMT_LEADCHNL_RA_DTM: NORMAL
MDC_IDC_MSMT_LEADCHNL_RA_IMPEDANCE_VALUE: 494
MDC_IDC_MSMT_LEADCHNL_RA_PACING_THRESHOLD_AMPLITUDE: 0.5
MDC_IDC_MSMT_LEADCHNL_RA_PACING_THRESHOLD_POLARITY: NORMAL
MDC_IDC_MSMT_LEADCHNL_RA_PACING_THRESHOLD_PULSEWIDTH: 0.4
MDC_IDC_MSMT_LEADCHNL_RA_SENSING_INTR_AMPL: 1.5
MDC_IDC_MSMT_LEADCHNL_RV_DTM: NORMAL
MDC_IDC_MSMT_LEADCHNL_RV_IMPEDANCE_VALUE: 475
MDC_IDC_MSMT_LEADCHNL_RV_PACING_THRESHOLD_AMPLITUDE: 2.5
MDC_IDC_MSMT_LEADCHNL_RV_PACING_THRESHOLD_POLARITY: NORMAL
MDC_IDC_MSMT_LEADCHNL_RV_PACING_THRESHOLD_PULSEWIDTH: 0.4
MDC_IDC_MSMT_LEADCHNL_RV_SENSING_INTR_AMPL: 31.62
MDC_IDC_PG_IMPLANT_DTM: NORMAL
MDC_IDC_PG_MFG: NORMAL
MDC_IDC_PG_MODEL: NORMAL
MDC_IDC_PG_SERIAL: NORMAL
MDC_IDC_PG_TYPE: NORMAL
MDC_IDC_SESS_DTM: NORMAL
MDC_IDC_SESS_TYPE: NORMAL
MDC_IDC_SET_BRADY_AT_MODE_SWITCH_RATE: 171
MDC_IDC_SET_BRADY_LOWRATE: 60
MDC_IDC_SET_BRADY_MAX_SENSOR_RATE: 130
MDC_IDC_SET_BRADY_MAX_TRACKING_RATE: 130
MDC_IDC_SET_BRADY_MODE: NORMAL
MDC_IDC_SET_BRADY_PAV_DELAY: 180
MDC_IDC_SET_BRADY_SAV_DELAY: 150
MDC_IDC_SET_LEADCHNL_RA_PACING_AMPLITUDE: 1.5
MDC_IDC_SET_LEADCHNL_RA_PACING_POLARITY: NORMAL
MDC_IDC_SET_LEADCHNL_RA_PACING_PULSEWIDTH: 0.4
MDC_IDC_SET_LEADCHNL_RA_SENSING_POLARITY: NORMAL
MDC_IDC_SET_LEADCHNL_RA_SENSING_SENSITIVITY: 0.3
MDC_IDC_SET_LEADCHNL_RV_PACING_AMPLITUDE: 3
MDC_IDC_SET_LEADCHNL_RV_PACING_POLARITY: NORMAL
MDC_IDC_SET_LEADCHNL_RV_PACING_PULSEWIDTH: 1
MDC_IDC_SET_LEADCHNL_RV_SENSING_POLARITY: NORMAL
MDC_IDC_SET_LEADCHNL_RV_SENSING_SENSITIVITY: 0.9
MDC_IDC_SET_ZONE_STATUS: NORMAL
MDC_IDC_SET_ZONE_STATUS: NORMAL
MDC_IDC_SET_ZONE_TYPE: NORMAL
MDC_IDC_SET_ZONE_TYPE: NORMAL
MDC_IDC_STAT_AT_BURDEN_PERCENT: 0
MDC_IDC_STAT_BRADY_RA_PERCENT_PACED: 9.71
MDC_IDC_STAT_BRADY_RV_PERCENT_PACED: 99.87

## (undated) DEVICE — GLV SURG PREMIERPRO ORTHO LTX PF SZ7.5 BRN

## (undated) DEVICE — LABEL SHEET CUSTOM 2X2 YELLOW: Brand: MEDLINE INDUSTRIES, INC.

## (undated) DEVICE — Device

## (undated) DEVICE — FEMORAL ENTRY ANGIOGRAPHY SHIELD-YELLOW: Brand: RADPAD

## (undated) DEVICE — IRR EAR 2OZ

## (undated) DEVICE — INTRO SHEATH PRELUDE SNAP .038 7F 13CM W/SDPRT

## (undated) DEVICE — STPLR SKIN VISISTAT WD 35CT

## (undated) DEVICE — RADIFOCUS OPTITORQUE ANGIOGRAPHIC CATHETER: Brand: OPTITORQUE

## (undated) DEVICE — CANN VESL CORNRY STR W/6MM BALN

## (undated) DEVICE — TUBING, SUCTION, 1/4" X 20', STRAIGHT: Brand: MEDLINE INDUSTRIES, INC.

## (undated) DEVICE — TBG ART PRESS 60 IN

## (undated) DEVICE — BG TRANSF W/COUPLER SPK 600ML

## (undated) DEVICE — HEMOCONCENTRATOR PERFUS LPS06

## (undated) DEVICE — DGW .035 FC J3MM 260CM TEF: Brand: EMERALD

## (undated) DEVICE — SUT MNCRYL 4/0 RB1 27IN Y214H

## (undated) DEVICE — ANTI-FOG SOLUTION WITH FOAM PAD: Brand: DEVON

## (undated) DEVICE — INTRO SHEATH PRELUDE SNAP .038 9F 13CM W/SDPRT BLK

## (undated) DEVICE — ST TOURNI COMPL A/ 7IN

## (undated) DEVICE — SYS PERFUS SEP PLATLT W TIPS CUST

## (undated) DEVICE — GOWN,NON-REINFORCED,SIRUS,SET IN SLV,XL: Brand: MEDLINE

## (undated) DEVICE — PK HEART OPN 40

## (undated) DEVICE — PK PERFUS CUST W/CARDIOPLEGIA

## (undated) DEVICE — COAGULATOR SXN FTSWTCH 10F6IN

## (undated) DEVICE — GLIDESHEATH SLENDER STAINLESS STEEL KIT: Brand: GLIDESHEATH SLENDER

## (undated) DEVICE — BALN PRESS WEDGE 5F 110CM

## (undated) DEVICE — CATH DIAG IMPULSE FR4 5F 100CM

## (undated) DEVICE — DRAPE,REIN 53X77,STERILE: Brand: MEDLINE

## (undated) DEVICE — SENSR CERBRL O2 PK/2

## (undated) DEVICE — DECANTER BAG 9": Brand: MEDLINE INDUSTRIES, INC.

## (undated) DEVICE — 8 FOOT DISPOSABLE EXTENSION CABLE WITH SAFE CONNECT / ALLIGATOR CLIP

## (undated) DEVICE — DRSNG SURESITE WNDW 2.38X2.75

## (undated) DEVICE — ST PERFUS DLP M/

## (undated) DEVICE — GLV SURG BIOGEL LTX PF 7 1/2

## (undated) DEVICE — PENCL E/S HNDSWCH ROCKR CB

## (undated) DEVICE — PK ENT MINOR 40

## (undated) DEVICE — KT MANIFLD CARDIAC

## (undated) DEVICE — DRSNG WND GZ PAD BORDERED 4X8IN STRL

## (undated) DEVICE — CANN VESL CORNRY STR W/4MM BALN

## (undated) DEVICE — PK ATS CUST W CARDIOTOMY RESEVOIR

## (undated) DEVICE — TR BAND RADIAL ARTERY COMPRESSION DEVICE: Brand: TR BAND

## (undated) DEVICE — LOU PACE DEFIB: Brand: MEDLINE INDUSTRIES, INC.

## (undated) DEVICE — GLIDESHEATH BASIC HYDROPHILIC COATED INTRODUCER SHEATH: Brand: GLIDESHEATH

## (undated) DEVICE — CANN AORT ROOT DLP VNT/8IN 14G 7F

## (undated) DEVICE — CVR PROB 96IN LF STRL

## (undated) DEVICE — SOL ISO/ALC 70PCT 4OZ

## (undated) DEVICE — TBG PENCL TELESCP MEGADYNE SMOKE EVAC 10FT

## (undated) DEVICE — CODMAN® SURGICAL PATTIES 1/2" X 3" (1.27CM X 7.62CM): Brand: CODMAN®

## (undated) DEVICE — ST. SORBAVIEW ULTIMATE IJ SYSTEM A,C: Brand: CENTURION

## (undated) DEVICE — ELECTRD NDL EZ CLN MOD 2.75IN

## (undated) DEVICE — DRP SLUSH WARMR MACH RECTG 66X44IN

## (undated) DEVICE — SPNG DISSCT SECTO TONSL MD PK/5

## (undated) DEVICE — CATH DEFLECT SELECTSITE 9F 43CM W/ART HNDL

## (undated) DEVICE — PK CATH CARD 40

## (undated) DEVICE — CATH DIAG IMPULSE AR1 5F 100CM

## (undated) DEVICE — ORGANIZER SUT SHELIGH 3T 213013